# Patient Record
Sex: MALE | Race: WHITE | NOT HISPANIC OR LATINO | URBAN - METROPOLITAN AREA
[De-identification: names, ages, dates, MRNs, and addresses within clinical notes are randomized per-mention and may not be internally consistent; named-entity substitution may affect disease eponyms.]

---

## 2019-03-08 ENCOUNTER — EMERGENCY (EMERGENCY)
Facility: HOSPITAL | Age: 54
LOS: 1 days | Discharge: HOME | End: 2019-03-08
Attending: EMERGENCY MEDICINE
Payer: SELF-PAY

## 2019-03-08 VITALS
TEMPERATURE: 98 F | SYSTOLIC BLOOD PRESSURE: 135 MMHG | HEART RATE: 84 BPM | RESPIRATION RATE: 16 BRPM | OXYGEN SATURATION: 96 % | DIASTOLIC BLOOD PRESSURE: 77 MMHG

## 2019-03-08 VITALS
HEART RATE: 96 BPM | RESPIRATION RATE: 18 BRPM | TEMPERATURE: 98 F | DIASTOLIC BLOOD PRESSURE: 88 MMHG | OXYGEN SATURATION: 96 % | SYSTOLIC BLOOD PRESSURE: 145 MMHG

## 2019-03-08 PROCEDURE — 99284 EMERGENCY DEPT VISIT MOD MDM: CPT | Mod: 25

## 2019-03-08 PROCEDURE — 73590 X-RAY EXAM OF LOWER LEG: CPT | Mod: 26,50

## 2019-03-08 PROCEDURE — 72125 CT NECK SPINE W/O DYE: CPT | Mod: 26

## 2019-03-08 PROCEDURE — 73590 X-RAY EXAM OF LOWER LEG: CPT

## 2019-03-08 PROCEDURE — 70450 CT HEAD/BRAIN W/O DYE: CPT | Mod: 26

## 2019-03-08 PROCEDURE — 93005 ELECTROCARDIOGRAM TRACING: CPT

## 2019-03-08 PROCEDURE — 73090 X-RAY EXAM OF FOREARM: CPT | Mod: 26,LT

## 2019-03-08 PROCEDURE — 73130 X-RAY EXAM OF HAND: CPT

## 2019-03-08 PROCEDURE — 93010 ELECTROCARDIOGRAM REPORT: CPT

## 2019-03-08 PROCEDURE — 73090 X-RAY EXAM OF FOREARM: CPT

## 2019-03-08 PROCEDURE — 70450 CT HEAD/BRAIN W/O DYE: CPT

## 2019-03-08 PROCEDURE — 72125 CT NECK SPINE W/O DYE: CPT

## 2019-03-08 PROCEDURE — 73130 X-RAY EXAM OF HAND: CPT | Mod: 26,LT

## 2019-03-08 PROCEDURE — 99284 EMERGENCY DEPT VISIT MOD MDM: CPT

## 2019-03-08 RX ORDER — IBUPROFEN 200 MG
600 TABLET ORAL ONCE
Qty: 0 | Refills: 0 | Status: COMPLETED | OUTPATIENT
Start: 2019-03-08 | End: 2019-03-08

## 2019-03-08 RX ADMIN — Medication 600 MILLIGRAM(S): at 09:45
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
## 2019-03-08 NOTE — ED ADULT NURSE REASSESSMENT NOTE - NS ED NURSE REASSESS COMMENT FT1
Pt is resting in bed comfortably at this time, no apparent distress noted at this time. pt safety maintained. Pt denies any complaints at this time. Pt is awaiting social work and will plan for discharge, pt states he will follow up with Department of , Plan of care explained, pt states understanding and was able to successfully teach back to show proficiency.

## 2019-03-08 NOTE — ED ADULT NURSE REASSESSMENT NOTE - NS ED NURSE REASSESS COMMENT FT1
Pt able to ambulate safely and steadily w/out assistance, denies dizziness/weakness upon standing, pt offered DSS services and he refused them, stating "I don't need the help." Pt educated in response to his declination of assistance and education deemed successful after teach back provides proficiency. Pt education deemed successful at time of discharge after patient education and teach back proves proficiency. Pt has no distress at time of discharge, pt provided discharge instructions, follow up care and results reviewed by MD. Reinforced by the RN at time of discharge.

## 2019-03-08 NOTE — ED ADULT NURSE NOTE - NSIMPLEMENTINTERV_GEN_ALL_ED
Implemented All Universal Safety Interventions:  [place] to call system. Call bell, personal items and telephone within reach. Instruct patient to call for assistance. Room bathroom lighting operational. Non-slip footwear when patient is off stretcher. Physically safe environment: no spills, clutter or unnecessary equipment. Stretcher in lowest position, wheels locked, appropriate side rails in place.

## 2019-03-08 NOTE — ED ADULT TRIAGE NOTE - CHIEF COMPLAINT QUOTE
Pt BIBA ambulatory c/o difficulty breathing, states he started having a panic attack at approx 2AM. Denies any chest pain or discomfort. States " my brother has asthma, maybe I do too."

## 2019-03-13 NOTE — ED PROVIDER NOTE - OBJECTIVE STATEMENT
52 yo male pmh panic attacks comes to ed with shortness of breath  that started at 2am; symptoms  improved while in ed

## 2019-06-30 ENCOUNTER — EMERGENCY (EMERGENCY)
Facility: HOSPITAL | Age: 54
LOS: 1 days | Discharge: HOME | End: 2019-06-30
Attending: EMERGENCY MEDICINE
Payer: SELF-PAY

## 2019-06-30 VITALS
DIASTOLIC BLOOD PRESSURE: 88 MMHG | OXYGEN SATURATION: 94 % | RESPIRATION RATE: 30 BRPM | WEIGHT: 160.06 LBS | HEIGHT: 72 IN | HEART RATE: 86 BPM | TEMPERATURE: 98 F | SYSTOLIC BLOOD PRESSURE: 172 MMHG

## 2019-06-30 VITALS
RESPIRATION RATE: 16 BRPM | OXYGEN SATURATION: 94 % | HEART RATE: 79 BPM | DIASTOLIC BLOOD PRESSURE: 74 MMHG | SYSTOLIC BLOOD PRESSURE: 158 MMHG

## 2019-06-30 LAB
ALBUMIN SERPL ELPH-MCNC: 3.7 G/DL — SIGNIFICANT CHANGE UP (ref 3.3–5.2)
ALP SERPL-CCNC: 109 U/L — SIGNIFICANT CHANGE UP (ref 40–120)
ALT FLD-CCNC: 60 U/L — HIGH
ANION GAP SERPL CALC-SCNC: 9 MMOL/L — SIGNIFICANT CHANGE UP (ref 5–17)
APTT BLD: 31.4 SEC — SIGNIFICANT CHANGE UP (ref 27.5–36.3)
AST SERPL-CCNC: 37 U/L — SIGNIFICANT CHANGE UP
BILIRUB SERPL-MCNC: <0.2 MG/DL — LOW (ref 0.4–2)
BUN SERPL-MCNC: 30 MG/DL — HIGH (ref 8–20)
CALCIUM SERPL-MCNC: 9 MG/DL — SIGNIFICANT CHANGE UP (ref 8.6–10.2)
CHLORIDE SERPL-SCNC: 106 MMOL/L — SIGNIFICANT CHANGE UP (ref 98–107)
CO2 SERPL-SCNC: 26 MMOL/L — SIGNIFICANT CHANGE UP (ref 22–29)
CREAT SERPL-MCNC: 1.15 MG/DL — SIGNIFICANT CHANGE UP (ref 0.5–1.3)
GLUCOSE SERPL-MCNC: 92 MG/DL — SIGNIFICANT CHANGE UP (ref 70–115)
HCT VFR BLD CALC: 42.1 % — SIGNIFICANT CHANGE UP (ref 39–50)
HGB BLD-MCNC: 13.4 G/DL — SIGNIFICANT CHANGE UP (ref 13–17)
INR BLD: 1.1 RATIO — SIGNIFICANT CHANGE UP (ref 0.88–1.16)
MCHC RBC-ENTMCNC: 30.6 PG — SIGNIFICANT CHANGE UP (ref 27–34)
MCHC RBC-ENTMCNC: 31.8 GM/DL — LOW (ref 32–36)
MCV RBC AUTO: 96.1 FL — SIGNIFICANT CHANGE UP (ref 80–100)
PLATELET # BLD AUTO: 292 K/UL — SIGNIFICANT CHANGE UP (ref 150–400)
POTASSIUM SERPL-MCNC: 4.3 MMOL/L — SIGNIFICANT CHANGE UP (ref 3.5–5.3)
POTASSIUM SERPL-SCNC: 4.3 MMOL/L — SIGNIFICANT CHANGE UP (ref 3.5–5.3)
PROT SERPL-MCNC: 6.2 G/DL — LOW (ref 6.6–8.7)
PROTHROM AB SERPL-ACNC: 12.7 SEC — SIGNIFICANT CHANGE UP (ref 10–12.9)
RBC # BLD: 4.38 M/UL — SIGNIFICANT CHANGE UP (ref 4.2–5.8)
RBC # FLD: 13.2 % — SIGNIFICANT CHANGE UP (ref 10.3–14.5)
SODIUM SERPL-SCNC: 141 MMOL/L — SIGNIFICANT CHANGE UP (ref 135–145)
TROPONIN T SERPL-MCNC: <0.01 NG/ML — SIGNIFICANT CHANGE UP (ref 0–0.06)
WBC # BLD: 13.35 K/UL — HIGH (ref 3.8–10.5)
WBC # FLD AUTO: 13.35 K/UL — HIGH (ref 3.8–10.5)

## 2019-06-30 PROCEDURE — 85730 THROMBOPLASTIN TIME PARTIAL: CPT

## 2019-06-30 PROCEDURE — 99285 EMERGENCY DEPT VISIT HI MDM: CPT

## 2019-06-30 PROCEDURE — 71045 X-RAY EXAM CHEST 1 VIEW: CPT

## 2019-06-30 PROCEDURE — 99284 EMERGENCY DEPT VISIT MOD MDM: CPT | Mod: 25

## 2019-06-30 PROCEDURE — 36415 COLL VENOUS BLD VENIPUNCTURE: CPT

## 2019-06-30 PROCEDURE — 94640 AIRWAY INHALATION TREATMENT: CPT

## 2019-06-30 PROCEDURE — 85610 PROTHROMBIN TIME: CPT

## 2019-06-30 PROCEDURE — 84484 ASSAY OF TROPONIN QUANT: CPT

## 2019-06-30 PROCEDURE — 80053 COMPREHEN METABOLIC PANEL: CPT

## 2019-06-30 PROCEDURE — 85027 COMPLETE CBC AUTOMATED: CPT

## 2019-06-30 PROCEDURE — 71045 X-RAY EXAM CHEST 1 VIEW: CPT | Mod: 26

## 2019-06-30 PROCEDURE — 93010 ELECTROCARDIOGRAM REPORT: CPT

## 2019-06-30 PROCEDURE — 93005 ELECTROCARDIOGRAM TRACING: CPT

## 2019-06-30 PROCEDURE — 99053 MED SERV 10PM-8AM 24 HR FAC: CPT

## 2019-06-30 PROCEDURE — 96374 THER/PROPH/DIAG INJ IV PUSH: CPT

## 2019-06-30 RX ORDER — SODIUM CHLORIDE 9 MG/ML
3 INJECTION INTRAMUSCULAR; INTRAVENOUS; SUBCUTANEOUS ONCE
Refills: 0 | Status: COMPLETED | OUTPATIENT
Start: 2019-06-30 | End: 2019-06-30

## 2019-06-30 RX ORDER — ALBUTEROL 90 UG/1
2 AEROSOL, METERED ORAL
Qty: 1 | Refills: 0
Start: 2019-06-30 | End: 2019-07-29

## 2019-06-30 RX ORDER — AZITHROMYCIN 500 MG/1
500 TABLET, FILM COATED ORAL ONCE
Refills: 0 | Status: COMPLETED | OUTPATIENT
Start: 2019-06-30 | End: 2019-06-30

## 2019-06-30 RX ORDER — AZITHROMYCIN 500 MG/1
2 TABLET, FILM COATED ORAL
Qty: 1 | Refills: 0
Start: 2019-06-30 | End: 2019-07-04

## 2019-06-30 RX ORDER — ALBUTEROL 90 UG/1
2.5 AEROSOL, METERED ORAL ONCE
Refills: 0 | Status: COMPLETED | OUTPATIENT
Start: 2019-06-30 | End: 2019-06-30

## 2019-06-30 RX ADMIN — SODIUM CHLORIDE 3 MILLILITER(S): 9 INJECTION INTRAMUSCULAR; INTRAVENOUS; SUBCUTANEOUS at 08:09

## 2019-06-30 RX ADMIN — AZITHROMYCIN 255 MILLIGRAM(S): 500 TABLET, FILM COATED ORAL at 10:28

## 2019-06-30 RX ADMIN — ALBUTEROL 2.5 MILLIGRAM(S): 90 AEROSOL, METERED ORAL at 10:28

## 2019-06-30 NOTE — ED ADULT NURSE NOTE - CHIEF COMPLAINT QUOTE
pt with difficulty breathing starting three hours ago was trying to sleep, with chest pressure. pt having difficulty speaking full sentences. md called to bedside

## 2019-06-30 NOTE — ED PROVIDER NOTE - CLINICAL SUMMARY MEDICAL DECISION MAKING FREE TEXT BOX
PT WITH SUDDEN ONSET OF SOB AND CHEST PRESSURE. HX HEAVY SMOKING - QUIT APPROX 1-3 MONTHS AGO. NO FEVER OR CHILLS. NO COUGH. NO DX COPD.  CP SS AND NON RADIATING. PRIMARY COMPLAINT SOB. PE DIMINISHED BS , CARDIAC RRR. EKG NSR NO ISCHEMIA. WILL GIVE NEBS AND CHECK LABS PT WITH SUDDEN ONSET OF SOB AND CHEST PRESSURE. HX HEAVY SMOKING - QUIT APPROX 1-3 MONTHS AGO. NO FEVER OR CHILLS. NO COUGH. NO DX COPD.  CP SS AND NON RADIATING. PRIMARY COMPLAINT SOB. PE DIMINISHED BS , CARDIAC RRR. EKG NSR NO ISCHEMIA. WILL GIVE NEBS AND CHECK LABS  PT ELOPED PRIRO TO SIGNING AMA  LABS NORMAL X 1 TROP  CXR ? EARLY RLL INFILTRATE  WILL SEND SCRIPTS TO [facility] [address] AS PER PT IDENTIFYING THAT AS HIS PHARMACY OF CHOICE

## 2019-06-30 NOTE — ED ADULT NURSE NOTE - NSIMPLEMENTINTERV_GEN_ALL_ED
Implemented All Fall Risk Interventions:  [place] to call system. Call bell, personal items and telephone within reach. Instruct patient to call for assistance. Room bathroom lighting operational. Non-slip footwear when patient is off stretcher. Physically safe environment: no spills, clutter or unnecessary equipment. Stretcher in lowest position, wheels locked, appropriate side rails in place. Provide visual cue, wrist band, yellow gown, etc. Monitor gait and stability. Monitor for mental status changes and reorient to person, place, and time. Review medications for side effects contributing to fall risk. Reinforce activity limits and safety measures with patient and family.

## 2019-06-30 NOTE — ED ADULT TRIAGE NOTE - CHIEF COMPLAINT QUOTE
pt with difficulty breathing starting three hours ago was trying to sleep, with chest pressure. pt having difficulty speaking full sentences pt with difficulty breathing starting three hours ago was trying to sleep, with chest pressure. pt having difficulty speaking full sentences. md called to bedside

## 2019-06-30 NOTE — ED ADULT NURSE REASSESSMENT NOTE - NS ED NURSE REASSESS COMMENT FT1
IV zithromax started to infuse per md's order, pt now reporting " I don't like this , I want a pill instead, I really don't like this and I want it out, I feel fine now" iv disconnected per pt request after lengthy discussion as to why it was important to have the IV. pt continues to request it out. Dr. Juarez aware, to speak with pt.
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
## 2019-06-30 NOTE — ED PROVIDER NOTE - PROGRESS NOTE DETAILS
FULL BREATH SOUNDS BILATERALLY. FEELS BETTER PT ASKED RN TO TAKE OUT IV. I WAS PROMPTY CALLED AND SAW PT. PT STATES HE WANTED TO LEAVE THE ER. I EXPLAINED THAT HE NEEDED REPEAT LABS AS PERT OF HIS  WORK UP AND THAT I COULD NOT BE SURE THAT HIS HEART WAS ALRIGHT. I ALSO EXPLAINED THAT HE NEEDED ANTIBIOTICS AND AND AN INHALER FOR HIS LUNG ISSUES. PT STATED HE DID NOT WANT TO STAY DESPITE THE RISK OF HEART ATTACK OR EVEN DEATH. HE UNDERSTOOD THE BENEFIT OF CONTINUED CARE. HE WAS GOING TO SIGN AMA BUT ELOPED PRIOR TO OBTAINING AMA PAPERWORK.

## 2019-06-30 NOTE — ED PROVIDER NOTE - OBJECTIVE STATEMENT
53 yo MALE WITH SUDDEN ONSET SOB AND CHEST PRESSURE. STATES UNABLE TO SLEEP BC OF DIFFICULTY BREATHING. NO FEVER OR CHILLS. NO COUGH . NO SIMILAR PAST EVENTS THAT ARE SIMILAR. CALLED 911 BC HE WAS SCARED. HX HEAVY SMOKER FOR OVER 40 YEARS. CP + PRESSURE . SSCP NO RADIATION. NO N/DIAPHORESIS. DENIES OTHER MEDICAL ISSUES  SOC HX FORMER SMOKER

## 2021-06-26 ENCOUNTER — EMERGENCY (EMERGENCY)
Facility: HOSPITAL | Age: 56
LOS: 1 days | Discharge: HOME | End: 2021-06-26
Attending: EMERGENCY MEDICINE
Payer: COMMERCIAL

## 2021-06-26 VITALS
OXYGEN SATURATION: 98 % | DIASTOLIC BLOOD PRESSURE: 91 MMHG | SYSTOLIC BLOOD PRESSURE: 160 MMHG | HEART RATE: 97 BPM | RESPIRATION RATE: 18 BRPM | HEIGHT: 72 IN | TEMPERATURE: 98 F | WEIGHT: 150.58 LBS

## 2021-06-26 PROCEDURE — 99283 EMERGENCY DEPT VISIT LOW MDM: CPT | Mod: 25

## 2021-06-26 PROCEDURE — 87075 CULTR BACTERIA EXCEPT BLOOD: CPT

## 2021-06-26 PROCEDURE — 87077 CULTURE AEROBIC IDENTIFY: CPT

## 2021-06-26 PROCEDURE — 87205 SMEAR GRAM STAIN: CPT

## 2021-06-26 PROCEDURE — 99284 EMERGENCY DEPT VISIT MOD MDM: CPT | Mod: 25

## 2021-06-26 PROCEDURE — 10061 I&D ABSCESS COMP/MULTIPLE: CPT

## 2021-06-26 PROCEDURE — 87070 CULTURE OTHR SPECIMN AEROBIC: CPT

## 2021-06-26 NOTE — ED PROVIDER NOTE - IV ALTEPLASE EXCL REL HIDDEN
show after drinking po contrast, pt became flushed with generalized pruritis, no hives noted, will give pepcid, benadryl and solumedrol upon discahrge pt is staing sharp burning abdominal pain , similar to that of what brought her in, she is requesting to have an endoscopy because she is worried about an ulcer in light of the negative us and ct of her abdomen. explained to pt that we do not do endoscopy here in the emergency room and that she needs to follow up with her gi Dr. De Leon. Pt did eat 2 turkey sandwhiches and did not vomitingor c/o pain, will give toradol fro pain and encouraged pt to use miralx at [facility] fro chronic constipation

## 2021-06-26 NOTE — ED PROVIDER NOTE - OBJECTIVE STATEMENT
Pt is a 56 y.o. M presenting with abscess for three weeks. Pt states he has had pain and swelling with redness on his right upper back. For the last two days he had some chills prompting his eval in the ED. Not taking anything for pain. Pain feels like prior abscess on shoulder in the past.

## 2021-06-26 NOTE — ED ADULT TRIAGE NOTE - BP NONINVASIVE DIASTOLIC (MM HG)
Data: Vital signs within normal limits. Postpartum checks within normal limits - see flow record. Chronic HTN and takes Labetalol BID 50mg. Patient eating and drinking normally. Patient able to empty bladder independently and is up ambulating. No apparent signs of infection. Incision healing well. Patient performing self cares and is able to care for infant.  Action: Patient medicated during the shift for pain. See MAR. Patient reassessed within 1 hour after each medication and pain was improved - patient stated she was comfortable. Patient education done about breastfeeding, incision care, pain medications, and treatment plan. See flow record.  Response: Positive attachment behaviors observed with infant. Support person present.   Plan: Will continue plan of care.    91

## 2021-06-26 NOTE — ED PROVIDER NOTE - PHYSICAL EXAMINATION
General: NAD  Head:  NC, AT  Eyes: EOMI, PERRLA, no scleral icterus  Ears: no erythema/drainage  Nose: midline, no bleeding/drainage  Throat: MMM  Cardiac: RRR, no m/r/g, no lower extremity edema  Respiratory: CTABL, no wheezes/rales/rhonchi, equal chest wall expansions, no use of accessory muscles, no retractions  Abdomen: soft, nondistended, nontender, no rebound tenderness, no guarding, nonperitonitic  Skin: 3cm x 2cm erythematous swelling on right upper back  MSK/Vascular: full ROM, soft compartments, warm extremities  Neuro: Alert and oriented, motor/sensory intact  Psych: calm, cooperative, normal affect

## 2021-06-26 NOTE — ED PROVIDER NOTE - NSFOLLOWUPINSTRUCTIONS_ED_ALL_ED_FT
Seek medical attention to evaluate wound in 48 hours for packing removal.  Seek medical attention if having new or worsening symptoms.

## 2021-06-26 NOTE — ED PROVIDER NOTE - NS ED ROS FT
Constitutional: no fever, no sweats, and +chills.  Eyes: no pain, no redness, and no visual changes.  ENMT: no ear pain and no hearing problems, no nasal congestion/drainage, no dysphagia, and no throat pain, no neck pain, no stiffness  CV: no chest pain, no edema.  Resp: no cough, no dyspnea  GI: no abdominal pain, no bloating, no constipation, no diarrhea, no nausea and no vomiting.  : no dysuria, no hematuria  MSK: no weakness  Skin: +rashes.  Neuro: no LOC, no headache, no sensory deficits, and no weakness.

## 2021-06-26 NOTE — ED PROVIDER NOTE - ATTENDING CONTRIBUTION TO CARE
Indurated area on the back with small amount of fluctuance consistent with abscess formation, s/p I+D. Wound culture sent. PO abx, wound check in 48 hours.

## 2021-06-26 NOTE — ED PROVIDER NOTE - PATIENT PORTAL LINK FT
You can access the FollowMyHealth Patient Portal offered by [facility] by registering at the following website: [email or web address]. By joining [employer]’s FollowMyHealth portal, you will also be able to view your health information using other applications (apps) compatible with our system.

## 2021-06-26 NOTE — ED PROVIDER NOTE - CLINICAL SUMMARY MEDICAL DECISION MAKING FREE TEXT BOX
56 y.o. M w/ abscess on right upper back. I&D at bedside with sanguinopurulent fluid expressed, sent for wound culture. Discharge with antibiotics and recommended for wound check in two days.

## 2021-07-01 ENCOUNTER — EMERGENCY (EMERGENCY)
Facility: HOSPITAL | Age: 56
LOS: 1 days | Discharge: LEFT BEFORE DISCHARGE | End: 2021-07-01
Attending: EMERGENCY MEDICINE
Payer: COMMERCIAL

## 2021-07-01 VITALS
HEART RATE: 86 BPM | TEMPERATURE: 98 F | RESPIRATION RATE: 26 BRPM | WEIGHT: 179.9 LBS | DIASTOLIC BLOOD PRESSURE: 79 MMHG | SYSTOLIC BLOOD PRESSURE: 155 MMHG | OXYGEN SATURATION: 94 % | HEIGHT: 72 IN

## 2021-07-01 LAB
CULTURE RESULTS: SIGNIFICANT CHANGE UP
SPECIMEN SOURCE: SIGNIFICANT CHANGE UP

## 2021-07-01 PROCEDURE — 99283 EMERGENCY DEPT VISIT LOW MDM: CPT

## 2021-07-01 PROCEDURE — 93010 ELECTROCARDIOGRAM REPORT: CPT

## 2021-07-01 PROCEDURE — 93005 ELECTROCARDIOGRAM TRACING: CPT

## 2021-07-01 PROCEDURE — 99284 EMERGENCY DEPT VISIT MOD MDM: CPT

## 2021-07-01 NOTE — ED ADULT NURSE REASSESSMENT NOTE - NS ED NURSE REASSESS COMMENT FT1
Pt. screaming "f*ck you this is the worse hospital I am leaving", after receiving a salad instead of a sandwich. Deescalation techniques failed. Pt. ripped off all leads and walked out the door. MD Diana made aware. NO IV.

## 2021-07-01 NOTE — ED PROVIDER NOTE - OBJECTIVE STATEMENT
55 y/o male denies PMHx c/o difficulty breathing. Pt states he couldn't breath and had a panic attack, while walking to get groceries and it started raining. Pt endorses abscess to back, states he was here a few days ago, had it drained, had not started antibiotics. Pt states he is living with a friend.

## 2021-07-05 ENCOUNTER — EMERGENCY (EMERGENCY)
Facility: HOSPITAL | Age: 56
LOS: 1 days | Discharge: LEFT BEFORE DISCHARGE | End: 2021-07-05
Attending: EMERGENCY MEDICINE
Payer: COMMERCIAL

## 2021-07-05 VITALS
WEIGHT: 149.91 LBS | DIASTOLIC BLOOD PRESSURE: 79 MMHG | SYSTOLIC BLOOD PRESSURE: 125 MMHG | OXYGEN SATURATION: 92 % | HEIGHT: 72 IN | TEMPERATURE: 98 F | RESPIRATION RATE: 26 BRPM | HEART RATE: 92 BPM

## 2021-07-05 VITALS
TEMPERATURE: 98 F | SYSTOLIC BLOOD PRESSURE: 150 MMHG | RESPIRATION RATE: 20 BRPM | HEART RATE: 86 BPM | OXYGEN SATURATION: 98 % | DIASTOLIC BLOOD PRESSURE: 84 MMHG

## 2021-07-05 LAB — SARS-COV-2 RNA SPEC QL NAA+PROBE: SIGNIFICANT CHANGE UP

## 2021-07-05 PROCEDURE — 93010 ELECTROCARDIOGRAM REPORT: CPT

## 2021-07-05 PROCEDURE — 71045 X-RAY EXAM CHEST 1 VIEW: CPT | Mod: 26

## 2021-07-05 PROCEDURE — 99285 EMERGENCY DEPT VISIT HI MDM: CPT

## 2021-07-05 RX ORDER — DEXAMETHASONE 0.5 MG/5ML
6 ELIXIR ORAL ONCE
Refills: 0 | Status: COMPLETED | OUTPATIENT
Start: 2021-07-05 | End: 2021-07-05

## 2021-07-05 RX ORDER — IPRATROPIUM/ALBUTEROL SULFATE 18-103MCG
3 AEROSOL WITH ADAPTER (GRAM) INHALATION
Refills: 0 | Status: COMPLETED | OUTPATIENT
Start: 2021-07-05 | End: 2021-07-05

## 2021-07-05 RX ADMIN — Medication 3 MILLILITER(S): at 16:09

## 2021-07-05 RX ADMIN — Medication 3 MILLILITER(S): at 16:06

## 2021-07-05 RX ADMIN — Medication 3 MILLILITER(S): at 16:08

## 2021-07-05 RX ADMIN — Medication 6 MILLIGRAM(S): at 16:53

## 2021-07-05 NOTE — ED PROVIDER NOTE - NS ED ROS FT
General: Denies fever, chills  HEENT: Denies sensory changes, sore throat  Neck: Denies neck pain, neck stiffness  Resp: + coughing, SOB  Cardiovascular: Denies CP, palpitations, LE edema  GI: Denies nausea, vomiting, abdominal pain, diarrhea, constipation, blood in stool  : Denies dysuria, hematuria, frequency, incontinence  MSK: Denies back pain  Neuro: Denies HA, dizziness, numbness, weakness  Skin: Denies rashes.

## 2021-07-05 NOTE — ED PROVIDER NOTE - PROGRESS NOTE DETAILS
Patient offered full assessment and treatment with IV, but refusing. informed that this is against medical advise and his risks permanent disability or death by refusing some/all treatment.    The patient will leave against medical advice after nebs and steroids.  We discussed all risks, benefits, and alternatives to the progression of treatment and the potential dangers of leaving including but not limited to permanent disability, injury, and death.  The patient was instructed that they are welcome to change their decision to leave against medical advice and return to the emergency department at any time and for any reason in order to allow us to render care.

## 2021-07-05 NOTE — ED PROVIDER NOTE - ATTENDING CONTRIBUTION TO CARE
56yoM; with pmh signif for COPD(40py smoker, quit 1 month ago); now p/w cough and sob x1 day. states he was in the store and had worsening sob. denies f/c/s. cough--nonproductive. c/o chest tightness--sscp, non-exertional, non-pleuritic, non-radiating, worse with cough. denies abd pain. denies back pain.    General:    mild resp distress  Head:     NC/AT, EOMI, oral mucosa moist  Neck:     trachea midline  Lungs:   exp wheezing, no retractions or nasal flaring.   CVS:     S1S2, RRR, no m/g/r  Abd:     +BS, s/nt/nd, no organomegaly  Ext:    2+ radial and pedal pulses, no c/c/e  Neuro: AAOx3, no sensory/motor deficits  A/P:  56yoM p/w copd exacerbation  -ekg, labs, xray, nebs, steroids, re-eval

## 2021-07-05 NOTE — ED PROVIDER NOTE - OBJECTIVE STATEMENT
57 y/o M former smoker 40 pack years presents with acute on chronic shortness of breath for the past 1 month. States he was at [employer] and "could not breath." States he has had similar episode in the past, denies prior hx of hospitalizations or intubations. States he has not seen a doctor in years. States he believes his symptoms are due to an I&D of an abscess of his upper back on 6/26 but was unable to get antibiotics because he is homeless. Endorses productive cough, yellow in color, non-bloody. Endorses subjective fevers. Denies chest pain. Denies nausea or vomiting. Denies abdominal pain. Endorses cocaine use 1 month ago, denies other ilicit drug use. Denies recent trauma.

## 2021-07-05 NOTE — ED ADULT NURSE NOTE - OBJECTIVE STATEMENT
Pt alert and oriented x4, Pt appears to be in acute distress breathing is labored, tachypneic, pt working to breath sats 89% on RA, pt placed on 3L nc now sats 94%. Pt has a cyst on his upper back which he had drained about 2 weeks ago, site appears red and puss noted to be draining from the center. Pt states he is homeless at this time and was not able to afford his abx that were prescribed. Pt requesting social work. At this time he is refusing "needles", Pt refusing bloodwork and IV. MD made aware. Pt on cardiac monitor, will continue to monitor closely.

## 2021-07-05 NOTE — ED PROVIDER NOTE - PHYSICAL EXAMINATION
General: Well appearing male in no acute distress, 91% on RA, up to 95% on 2-4L nasal cannula   HEENT: Normocephalic, atraumatic. Moist mucous membranes. Oropharynx clear. No lymphadenopathy.  Eyes: No scleral icterus. EOMI. ANNIE.  Neck:. Soft and supple. Full ROM without pain. No midline tenderness  Cardiac: Regular rate and regular rhythm. No murmurs, rubs, gallops. Peripheral pulses 2+ and symmetric. No LE edema.  Resp: Lungs CTAB. Speaking in full sentences. No wheezes, rales or rhonchi.  Abd: Soft, non-tender, non-distended. No guarding or rebound. No scars, masses, or lesions.  Back: Spine midline and non-tender. No CVA tenderness.    Skin: +firm abscess/nodule on upper back - no pus/discharge able to be expressed, outer border appears to be erythematous, non-tender.   Neuro: AO x 3. Moves all extremities symmetrically. Motor strength and sensation grossly intact.

## 2021-07-05 NOTE — ED ADULT TRIAGE NOTE - CHIEF COMPLAINT QUOTE
pt c/o shortness of breath on and off for the past couple of weeks pt 40 year pack a day smoker with history of emphysema,  pt speaking in full sentences at this time.

## 2021-07-05 NOTE — ED PROVIDER NOTE - CLINICAL SUMMARY MEDICAL DECISION MAKING FREE TEXT BOX
55 y/o M former smoker 40 pack years presents with acute on chronic shortness of breath for the past 1 month.   possible COPD exacerbation, pt at 91% on RA, improves up to 95% on 2-4L nasal cannula   possible PNA - productive cough, pt is afebrile   will give duo-nebs / steroids and cxr   pt is refusing IV and blood work - asked multiple times and was refused   abx for abscess that was I&Ded on 6/26/21, appears to be firm/erythematous border

## 2021-07-07 ENCOUNTER — INPATIENT (INPATIENT)
Facility: HOSPITAL | Age: 56
LOS: 7 days | Discharge: HOME | DRG: 180 | End: 2021-07-15
Attending: INTERNAL MEDICINE | Admitting: INTERNAL MEDICINE
Payer: COMMERCIAL

## 2021-07-07 VITALS
DIASTOLIC BLOOD PRESSURE: 75 MMHG | HEART RATE: 85 BPM | HEIGHT: 72 IN | SYSTOLIC BLOOD PRESSURE: 155 MMHG | RESPIRATION RATE: 19 BRPM | TEMPERATURE: 98 F | OXYGEN SATURATION: 98 % | WEIGHT: 165.35 LBS

## 2021-07-07 DIAGNOSIS — F19.10 OTHER PSYCHOACTIVE SUBSTANCE ABUSE, UNCOMPLICATED: ICD-10-CM

## 2021-07-07 DIAGNOSIS — T14.91XA SUICIDE ATTEMPT, INITIAL ENCOUNTER: ICD-10-CM

## 2021-07-07 LAB
ALBUMIN SERPL ELPH-MCNC: 3.3 G/DL — SIGNIFICANT CHANGE UP (ref 3.3–5.2)
ALP SERPL-CCNC: 122 U/L — HIGH (ref 40–120)
ALT FLD-CCNC: 15 U/L — SIGNIFICANT CHANGE UP
AMPHET UR-MCNC: NEGATIVE — SIGNIFICANT CHANGE UP
ANION GAP SERPL CALC-SCNC: 12 MMOL/L — SIGNIFICANT CHANGE UP (ref 5–17)
APAP SERPL-MCNC: <3 UG/ML — LOW (ref 10–26)
APPEARANCE UR: CLEAR — SIGNIFICANT CHANGE UP
AST SERPL-CCNC: 15 U/L — SIGNIFICANT CHANGE UP
BARBITURATES UR SCN-MCNC: NEGATIVE — SIGNIFICANT CHANGE UP
BASOPHILS # BLD AUTO: 0.05 K/UL — SIGNIFICANT CHANGE UP (ref 0–0.2)
BASOPHILS NFR BLD AUTO: 0.3 % — SIGNIFICANT CHANGE UP (ref 0–2)
BENZODIAZ UR-MCNC: NEGATIVE — SIGNIFICANT CHANGE UP
BILIRUB SERPL-MCNC: 0.2 MG/DL — LOW (ref 0.4–2)
BILIRUB UR-MCNC: NEGATIVE — SIGNIFICANT CHANGE UP
BUN SERPL-MCNC: 14.5 MG/DL — SIGNIFICANT CHANGE UP (ref 8–20)
CALCIUM SERPL-MCNC: 9.5 MG/DL — SIGNIFICANT CHANGE UP (ref 8.6–10.2)
CHLORIDE SERPL-SCNC: 100 MMOL/L — SIGNIFICANT CHANGE UP (ref 98–107)
CO2 SERPL-SCNC: 25 MMOL/L — SIGNIFICANT CHANGE UP (ref 22–29)
COCAINE METAB.OTHER UR-MCNC: POSITIVE
COLOR SPEC: YELLOW — SIGNIFICANT CHANGE UP
CREAT SERPL-MCNC: 0.9 MG/DL — SIGNIFICANT CHANGE UP (ref 0.5–1.3)
DIFF PNL FLD: NEGATIVE — SIGNIFICANT CHANGE UP
EOSINOPHIL # BLD AUTO: 0.03 K/UL — SIGNIFICANT CHANGE UP (ref 0–0.5)
EOSINOPHIL NFR BLD AUTO: 0.2 % — SIGNIFICANT CHANGE UP (ref 0–6)
ETHANOL SERPL-MCNC: <10 MG/DL — SIGNIFICANT CHANGE UP (ref 0–9)
GLUCOSE SERPL-MCNC: 126 MG/DL — HIGH (ref 70–99)
GLUCOSE UR QL: NEGATIVE MG/DL — SIGNIFICANT CHANGE UP
HCT VFR BLD CALC: 39.7 % — SIGNIFICANT CHANGE UP (ref 39–50)
HGB BLD-MCNC: 12.8 G/DL — LOW (ref 13–17)
IMM GRANULOCYTES NFR BLD AUTO: 0.6 % — SIGNIFICANT CHANGE UP (ref 0–1.5)
KETONES UR-MCNC: NEGATIVE — SIGNIFICANT CHANGE UP
LEUKOCYTE ESTERASE UR-ACNC: NEGATIVE — SIGNIFICANT CHANGE UP
LYMPHOCYTES # BLD AUTO: 1.1 K/UL — SIGNIFICANT CHANGE UP (ref 1–3.3)
LYMPHOCYTES # BLD AUTO: 5.6 % — LOW (ref 13–44)
MCHC RBC-ENTMCNC: 29.2 PG — SIGNIFICANT CHANGE UP (ref 27–34)
MCHC RBC-ENTMCNC: 32.2 GM/DL — SIGNIFICANT CHANGE UP (ref 32–36)
MCV RBC AUTO: 90.4 FL — SIGNIFICANT CHANGE UP (ref 80–100)
METHADONE UR-MCNC: NEGATIVE — SIGNIFICANT CHANGE UP
MONOCYTES # BLD AUTO: 1.29 K/UL — HIGH (ref 0–0.9)
MONOCYTES NFR BLD AUTO: 6.6 % — SIGNIFICANT CHANGE UP (ref 2–14)
NEUTROPHILS # BLD AUTO: 16.9 K/UL — HIGH (ref 1.8–7.4)
NEUTROPHILS NFR BLD AUTO: 86.7 % — HIGH (ref 43–77)
NITRITE UR-MCNC: NEGATIVE — SIGNIFICANT CHANGE UP
OPIATES UR-MCNC: NEGATIVE — SIGNIFICANT CHANGE UP
PCP SPEC-MCNC: SIGNIFICANT CHANGE UP
PCP UR-MCNC: NEGATIVE — SIGNIFICANT CHANGE UP
PH UR: 7 — SIGNIFICANT CHANGE UP (ref 5–8)
PLATELET # BLD AUTO: 567 K/UL — HIGH (ref 150–400)
POTASSIUM SERPL-MCNC: 3.7 MMOL/L — SIGNIFICANT CHANGE UP (ref 3.5–5.3)
POTASSIUM SERPL-SCNC: 3.7 MMOL/L — SIGNIFICANT CHANGE UP (ref 3.5–5.3)
PROT SERPL-MCNC: 6.8 G/DL — SIGNIFICANT CHANGE UP (ref 6.6–8.7)
PROT UR-MCNC: NEGATIVE MG/DL — SIGNIFICANT CHANGE UP
RBC # BLD: 4.39 M/UL — SIGNIFICANT CHANGE UP (ref 4.2–5.8)
RBC # FLD: 15 % — HIGH (ref 10.3–14.5)
SALICYLATES SERPL-MCNC: <0.6 MG/DL — LOW (ref 10–20)
SARS-COV-2 RNA SPEC QL NAA+PROBE: SIGNIFICANT CHANGE UP
SODIUM SERPL-SCNC: 137 MMOL/L — SIGNIFICANT CHANGE UP (ref 135–145)
SP GR SPEC: 1 — LOW (ref 1.01–1.02)
THC UR QL: POSITIVE
TROPONIN T SERPL-MCNC: <0.01 NG/ML — SIGNIFICANT CHANGE UP (ref 0–0.06)
UROBILINOGEN FLD QL: NEGATIVE MG/DL — SIGNIFICANT CHANGE UP
WBC # BLD: 19.49 K/UL — HIGH (ref 3.8–10.5)
WBC # FLD AUTO: 19.49 K/UL — HIGH (ref 3.8–10.5)

## 2021-07-07 PROCEDURE — 99223 1ST HOSP IP/OBS HIGH 75: CPT

## 2021-07-07 PROCEDURE — 99284 EMERGENCY DEPT VISIT MOD MDM: CPT | Mod: 25

## 2021-07-07 PROCEDURE — 93005 ELECTROCARDIOGRAM TRACING: CPT

## 2021-07-07 PROCEDURE — 96372 THER/PROPH/DIAG INJ SC/IM: CPT

## 2021-07-07 PROCEDURE — 94640 AIRWAY INHALATION TREATMENT: CPT

## 2021-07-07 PROCEDURE — U0003: CPT

## 2021-07-07 PROCEDURE — 84484 ASSAY OF TROPONIN QUANT: CPT

## 2021-07-07 PROCEDURE — 71045 X-RAY EXAM CHEST 1 VIEW: CPT

## 2021-07-07 PROCEDURE — U0005: CPT

## 2021-07-07 PROCEDURE — 99222 1ST HOSP IP/OBS MODERATE 55: CPT

## 2021-07-07 PROCEDURE — 71260 CT THORAX DX C+: CPT | Mod: 26,MA

## 2021-07-07 PROCEDURE — 99285 EMERGENCY DEPT VISIT HI MDM: CPT

## 2021-07-07 PROCEDURE — 36415 COLL VENOUS BLD VENIPUNCTURE: CPT

## 2021-07-07 RX ORDER — ENOXAPARIN SODIUM 100 MG/ML
40 INJECTION SUBCUTANEOUS DAILY
Refills: 0 | Status: DISCONTINUED | OUTPATIENT
Start: 2021-07-07 | End: 2021-07-07

## 2021-07-07 RX ORDER — RISPERIDONE 4 MG/1
1 TABLET ORAL
Refills: 0 | Status: DISCONTINUED | OUTPATIENT
Start: 2021-07-07 | End: 2021-07-15

## 2021-07-07 RX ORDER — NICOTINE POLACRILEX 2 MG
1 GUM BUCCAL DAILY
Refills: 0 | Status: DISCONTINUED | OUTPATIENT
Start: 2021-07-07 | End: 2021-07-15

## 2021-07-07 RX ORDER — ACETAMINOPHEN 500 MG
650 TABLET ORAL EVERY 6 HOURS
Refills: 0 | Status: DISCONTINUED | OUTPATIENT
Start: 2021-07-07 | End: 2021-07-15

## 2021-07-07 RX ORDER — PIPERACILLIN AND TAZOBACTAM 4; .5 G/20ML; G/20ML
3.38 INJECTION, POWDER, LYOPHILIZED, FOR SOLUTION INTRAVENOUS EVERY 8 HOURS
Refills: 0 | Status: DISCONTINUED | OUTPATIENT
Start: 2021-07-07 | End: 2021-07-13

## 2021-07-07 RX ORDER — ENOXAPARIN SODIUM 100 MG/ML
40 INJECTION SUBCUTANEOUS AT BEDTIME
Refills: 0 | Status: DISCONTINUED | OUTPATIENT
Start: 2021-07-08 | End: 2021-07-15

## 2021-07-07 RX ORDER — IPRATROPIUM/ALBUTEROL SULFATE 18-103MCG
3 AEROSOL WITH ADAPTER (GRAM) INHALATION EVERY 6 HOURS
Refills: 0 | Status: DISCONTINUED | OUTPATIENT
Start: 2021-07-07 | End: 2021-07-09

## 2021-07-07 RX ORDER — PIPERACILLIN AND TAZOBACTAM 4; .5 G/20ML; G/20ML
3.38 INJECTION, POWDER, LYOPHILIZED, FOR SOLUTION INTRAVENOUS ONCE
Refills: 0 | Status: COMPLETED | OUTPATIENT
Start: 2021-07-07 | End: 2021-07-07

## 2021-07-07 RX ORDER — TIOTROPIUM BROMIDE 18 UG/1
1 CAPSULE ORAL; RESPIRATORY (INHALATION) DAILY
Refills: 0 | Status: DISCONTINUED | OUTPATIENT
Start: 2021-07-07 | End: 2021-07-15

## 2021-07-07 RX ORDER — ALBUTEROL 90 UG/1
1 AEROSOL, METERED ORAL EVERY 4 HOURS
Refills: 0 | Status: DISCONTINUED | OUTPATIENT
Start: 2021-07-07 | End: 2021-07-15

## 2021-07-07 RX ORDER — TRAZODONE HCL 50 MG
100 TABLET ORAL AT BEDTIME
Refills: 0 | Status: DISCONTINUED | OUTPATIENT
Start: 2021-07-07 | End: 2021-07-15

## 2021-07-07 RX ORDER — OXYCODONE AND ACETAMINOPHEN 5; 325 MG/1; MG/1
1 TABLET ORAL EVERY 4 HOURS
Refills: 0 | Status: DISCONTINUED | OUTPATIENT
Start: 2021-07-07 | End: 2021-07-13

## 2021-07-07 RX ORDER — LANOLIN ALCOHOL/MO/W.PET/CERES
3 CREAM (GRAM) TOPICAL AT BEDTIME
Refills: 0 | Status: DISCONTINUED | OUTPATIENT
Start: 2021-07-07 | End: 2021-07-15

## 2021-07-07 RX ADMIN — OXYCODONE AND ACETAMINOPHEN 1 TABLET(S): 5; 325 TABLET ORAL at 19:06

## 2021-07-07 RX ADMIN — Medication 3 MILLIGRAM(S): at 21:10

## 2021-07-07 RX ADMIN — Medication 3 MILLILITER(S): at 20:55

## 2021-07-07 RX ADMIN — Medication 40 MILLIGRAM(S): at 19:06

## 2021-07-07 RX ADMIN — RISPERIDONE 1 MILLIGRAM(S): 4 TABLET ORAL at 21:10

## 2021-07-07 RX ADMIN — PIPERACILLIN AND TAZOBACTAM 25 GRAM(S): 4; .5 INJECTION, POWDER, LYOPHILIZED, FOR SOLUTION INTRAVENOUS at 19:07

## 2021-07-07 RX ADMIN — Medication 100 MILLIGRAM(S): at 21:10

## 2021-07-07 RX ADMIN — Medication 1 PATCH: at 21:10

## 2021-07-07 RX ADMIN — PIPERACILLIN AND TAZOBACTAM 200 GRAM(S): 4; .5 INJECTION, POWDER, LYOPHILIZED, FOR SOLUTION INTRAVENOUS at 10:38

## 2021-07-07 RX ADMIN — Medication 2 MILLIGRAM(S): at 20:02

## 2021-07-07 RX ADMIN — Medication 3 MILLILITER(S): at 14:07

## 2021-07-07 NOTE — BH CONSULTATION LIAISON ASSESSMENT NOTE - HPI (INCLUDE ILLNESS QUALITY, SEVERITY, DURATION, TIMING, CONTEXT, MODIFYING FACTORS, ASSOCIATED SIGNS AND SYMPTOMS)
Pt is a 57y/o homeless M with pmhx of "tobacco use [last use 1982], bipolar, schizophrenia, and manic depression" who presented to the ED on 7/7/21 s/p SI with attempt by jumping in front of traffic. Upon arrival to ED, pt was worked up for a non healing ulcer on his back which prompted a chest x-ray and CT; there was an incidental finding of LN enlargement and right middle lobe speculated lung mass [likely malignant]. Upon presentation pt was laying in bed in mild distress with SOB [pt refuses to use O2 and nasal cannula]. Pt endorsed his SA stating "I am tired of life; I have bad luck in life". He states that he has been feeling suicidal since the death of his mother [1998], but that this was his first attempt. Pt endorses that he was diagnosed with psych d/o as well as managed  while he was in [facility] [1989]. He states that he remained in [facility] for 20 years where he was prescribed Trazodone, with some relief but with adverse s/e of nausea and shakiness. Since his release, pt endorsed not being able to hold a job stating "I am not a productive member of society; I should be in a straight jacket". Pt admits to THC use [since 1982] and "occasional" cocaine use [both + on tox screen]. During interview pt was tangental and needed constant redirection, and would get intermittently agitated with paranoid thoughts that staff was "making fun" of him. He expressed not being afraid to "take them down", [which prompted staff to move him] but then denied having HI. Pt states having "possible" AH, expressing that he hears his mom calling him, but is unsure whether its his thoughts vs hallucinations. He denied VH or etoh use. Pt exhibited behavior that may be a danger to self [active SI] and others [threatening ED staff].

## 2021-07-07 NOTE — CONSULT NOTE ADULT - TIME BILLING
greater than 50% of time spent reviewing labs, notes, orders and radiographs, coordinating care  pt at bedside, PMD

## 2021-07-07 NOTE — BH CONSULTATION LIAISON ASSESSMENT NOTE - CURRENT MEDICATION
MEDICATIONS  (STANDING):  ALBUTerol    90 MICROgram(s) HFA Inhaler 1 Puff(s) Inhalation every 4 hours  albuterol/ipratropium for Nebulization 3 milliLiter(s) Nebulizer every 6 hours  enoxaparin Injectable 40 milliGRAM(s) SubCutaneous daily  piperacillin/tazobactam IVPB.. 3.375 Gram(s) IV Intermittent every 8 hours  tiotropium 18 MICROgram(s) Capsule 1 Capsule(s) Inhalation daily    MEDICATIONS  (PRN):

## 2021-07-07 NOTE — BH CONSULTATION LIAISON ASSESSMENT NOTE - DETAILS
Nausea and shakiness Post interview, pt had to be moved d/t making threats to nurse that he was going to "stab her"   During interview pt stated "taking people down" if they make fun of him  Pt endorsed many years of SI, with current episode being his first attempt. SI were worsened after the death of his mother [1998] +Dyspnea

## 2021-07-07 NOTE — ED ADULT NURSE NOTE - OBJECTIVE STATEMENT
Pt is a 55 y/o male w/ a hx of manic bipolar depression prsent to the ED c/o suicidal thoughts w/ a plan to jump in front of a car. pt states he is depressed and had a "blow out" with his brother. pt states he was at [address] [place] [address] years ago after his mother . pt states he is on trazodone. Pt denies any A/V/H, HI, prior SA or any recent etoh/drug use.

## 2021-07-07 NOTE — ED PROVIDER NOTE - ATTENDING CONTRIBUTION TO CARE
55yo male former smoker with no pmh presents with SI. Pt states has been depressed since his mother passed away. Pt states he wanted to get hit by a car. Pt denies fevers/chills, ha, loc, focal neuro deficits, cp/sob/palp, cough, abd pain/n/v/d, urinary symptoms, recent travel and sick contacts.  Pt has had visits in the past for sob and had abnormal cxr.  Const: Awake, alert and oriented. In no acute distress. Well appearing.  HEENT: NC/AT. Moist mucous membranes.  Eyes: No scleral icterus. EOMI.  Neck:. Soft and supple. Full ROM without pain.  Cardiac: Regular rate and regular rhythm. +S1/S2. Peripheral pulses 2+ and symmetric. No LE edema.  Resp: Speaking in full sentences. No evidence of respiratory distress. No wheezes, rales or rhonchi.  Abd: Soft, non-tender, non-distended. Normal bowel sounds in all 4 quadrants. No guarding or rebound.  Back: Spine midline and non-tender. No CVAT.  Skin: ulcerated lesion to the right back, with surrounding erythema  Lymph: No cervical lymphadenopathy.  Neuro: Awake, alert & oriented x 3. Moves all extremities symmetrically.  pt signed out pending CT chest and  eval

## 2021-07-07 NOTE — PATIENT PROFILE ADULT - NSPROPTRIGHTSUPPORTPERSON_GEN_A_NUR
declines DTs (delirium tremens)    EtOH dependence    Gastritis    Mixed hyperlipidemia    PUD (peptic ulcer disease)    Substance abuse

## 2021-07-07 NOTE — ED ADULT NURSE REASSESSMENT NOTE - NS ED NURSE REASSESS COMMENT FT1
Assumed care of patient at 0720.  Patient oriented to staff and educated about plan of care.  Patient transferred to CT scan for completion of testing without incident.  Patient provided breakfast tray and is eating in his room.  No attempts to harm self or others and safety maintained.

## 2021-07-07 NOTE — PATIENT PROFILE ADULT - STATED REASON FOR ADMISSION
Per patient "feeling short  of breath, tried comon=ing to the hospital, but ran into oncoming traffic."

## 2021-07-07 NOTE — ED ADULT NURSE REASSESSMENT NOTE - NS ED NURSE REASSESS COMMENT FT1
Report called to Cally Nieto RN.  Patient educated about transfer to main ED and transferred via wheelchair.  Safety of patient maintained.
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
## 2021-07-07 NOTE — PATIENT PROFILE ADULT - MEDICATIONS/VISITS
Note Text (......Xxx Chief Complaint.): This diagnosis correlates with the
Detail Level: Zone
Other (Free Text): Continue Allegra 180 qam, continue Benadryl qhs\\nSeen by Dr Pena today during today's visit.  Unsure if lesion related to doxy reaction. Appears more c/w spider bite. Pt to return immediately if sx worsen.
no

## 2021-07-07 NOTE — ED ADULT TRIAGE NOTE - CHIEF COMPLAINT QUOTE
Patient with complains of suicidal ideation with plan stating "noting is going good in my life I want to jump in front of car". Refusing to discuss further in triage stating " can I see psychiatrist, I don't want to talk about it over here".

## 2021-07-07 NOTE — BH CONSULTATION LIAISON ASSESSMENT NOTE - NSICDXPASTMEDICALHX_GEN_ALL_CORE_FT
PAST MEDICAL HISTORY:  Bipolar disorder     Depression     No pertinent past medical history     Schizophrenia

## 2021-07-07 NOTE — BH CONSULTATION LIAISON ASSESSMENT NOTE - VIOLENCE RISK FACTORS:
Antisocial behavior/cognition (past or present)/Violent ideation/threat/speech/Impulsivity/Noncompliance with treatment/Irritability

## 2021-07-07 NOTE — BH CONSULTATION LIAISON ASSESSMENT NOTE - NSBHCHARTREVIEWVS_PSY_A_CORE FT
Vital Signs Last 24 Hrs  T(C): 36.8 (07 Jul 2021 07:48), Max: 36.8 (07 Jul 2021 07:48)  T(F): 98.3 (07 Jul 2021 07:48), Max: 98.3 (07 Jul 2021 07:48)  HR: 80 (07 Jul 2021 10:35) (80 - 94)  BP: 153/87 (07 Jul 2021 07:48) (126/80 - 155/75)  BP(mean): --  RR: 18 (07 Jul 2021 10:35) (18 - 19)  SpO2: 95% (07 Jul 2021 10:35) (93% - 98%)
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
## 2021-07-07 NOTE — H&P ADULT - NSICDXFAMILYHX_GEN_ALL_CORE_FT
FAMILY HISTORY:  FH: lung cancer    Mother  Still living? Unknown  FH: diabetes mellitus, Age at diagnosis: Age Unknown
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
## 2021-07-07 NOTE — ED PROVIDER NOTE - PHYSICAL EXAMINATION
General: well appearing, interactive, well nourished, NAD  HEENT: pupils equal and reactive, normal external ears bilaterally   Cardiac: RRR, no MRG appreciated  Resp: lungs clear to auscultation bilaterally, symmetric chest wall rise  Abd: soft, nontender, nondistended,   : no CVA tenderness  Neuro: Moving all extremities  Skin:  normal color for race General: well appearing, interactive, well nourished, NAD  HEENT: pupils equal and reactive, normal external ears bilaterally   Cardiac: RRR, no MRG appreciated  Resp: lungs clear to auscultation bilaterally, symmetric chest wall rise  Abd: soft, nontender, nondistended,   : no CVA tenderness  Neuro: Moving all extremities  Skin:  4 x 3 cm area of ulceration posterior chest wall, minimal surrounding erythema
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
## 2021-07-07 NOTE — ED PROVIDER NOTE - NS ED ROS FT
CONSTITUTIONAL: No fevers, no chills  Eyes: No vision changes  Cardiovascular: No Chest pain  Respiratory: No SOB  Gastrointestinal: No n/v/c/d, no abd pain  Genitourinary: no dysuria, no hematuria  SKIN: no rashes.  MSK: no weakness, no myalgias, no arthralgias  NEURO: no headache, no weakness, no numbness  PSYCHIATRIC: +SI, no HI, no command hallucinations
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
## 2021-07-07 NOTE — ED PROVIDER NOTE - PROGRESS NOTE DETAILS
Todd PGY-3: Pt reassessed and found to have a 3x3 cm abscess on right upper back.  Will get CT prior to I&D. Adiel: I reviewed patient's CT results. Adiel: I discussed CT findings with Dr. PATRICIO of psychiatry. Pt will be psychiatrically evaluated and then will re-eval plan for CT findings/onc plan. Adiel: Dr. Horton and I informed patient of CT findings - discussed that mass could be 2/2 infectious vs neoplastic source. Pt informed of need for medical admission and agreeable.

## 2021-07-07 NOTE — BH CONSULTATION LIAISON ASSESSMENT NOTE - OTHER PAST PSYCHIATRIC HISTORY (INCLUDE DETAILS REGARDING ONSET, COURSE OF ILLNESS, INPATIENT/OUTPATIENT TREATMENT)
Bipolar, schizophrenia, manic depression [diagnosed in [facility]; did not elaborate year]

## 2021-07-07 NOTE — H&P ADULT - NSHPLABSRESULTS_GEN_ALL_CORE
12.8   19.49 )-----------( 567      ( 07 Jul 2021 04:55 )             39.7       07-07    137  |  100  |  14.5  ----------------------------<  126<H>  3.7   |  25.0  |  0.90    Ca    9.5      07 Jul 2021 04:55    TPro  6.8  /  Alb  3.3  /  TBili  0.2<L>  /  DBili  x   /  AST  15  /  ALT  15  /  AlkPhos  122<H>  07-07      < from: CT Chest w/ IV Cont (07.07.21 @ 07:43) >     EXAM:  CT CHEST IC                          PROCEDURE DATE:  07/07/2021          INTERPRETATION:  CLINICAL INFORMATION: Solitary pulmonary nodule, abnormal chest x-ray    COMPARISON: Chest x-ray 2 days prior    CONTRAST/COMPLICATIONS:  IV Contrast: Omnipaque 300  95 cc administered   5 cc discarded  Oral Contrast: NONE  Complications: None reported at time of study completion    PROCEDURE:  CT of the Chest was performed.  Sagittal and coronal reformats were performed.    FINDINGS:    LUNGS ANDAIRWAYS: There is severe narrowing of the bronchus intermedius and left mainstem bronchus secondary to large adjacent subcarinal lymphadenopathy. Severe background emphysema. Lateral segment right middle lobe spiculated nodule measures 3.1 x 2.6 cm abutting the major fissure and possibly crossing the fissure. Bibasilar patchy airspace disease, likely infectious.  PLEURA: No pleural abnormality.  VESSELS: Normal caliber aorta. Main pulmonary arteries are patent. Slight mass effect on the SVC by lymphadenopathy without occlusion or collaterals.  HEART: Normal heart size. No pericardial effusion.  MEDIASTINUM AND RAULITO: Extensive hilar and mediastinal lymphadenopathy. For reference:  -Subcarinal lymph node 4.8 x 4.7 cm (3-100)  -Right upper paratracheal lymph node 2.8 x 2.4 cm (3-47)  CHEST WALL AND LOW1ER NECK: Right posterior chest wall metastasis with suggestion of overlying skin ulceration measuring 3.3 x 2.1 cm (3-108)  VISUALIZED UPPER ABDOMEN: 2.4 x 1.8 cm indeterminate left adrenal nodule. Bilateral renal cortical scarring.  BONES: No aggressive lesion.    IMPRESSION:  *  Lateral segment right middle lobe spiculated nodule measures 3.1 x 2.6 cm abutting the major fissure and possibly crossing the fissure indicative of primary lungneoplasm.  *  Extensive mediastinal and hilar lymphadenopathy extending to the right upper paratracheal nodes.  *  Large subcarinal lymph node results in severe narrowing of the bronchus intermedius and left mainstem bronchus.  *  2.4 x 1.8 cm indeterminate left adrenal nodule  *  Right posterior chest wall metastasis with suggestion of overlying skin ulceration measuring 3.3 x 2.1 cm (3-108). Correlate with physical exam and tissue sampling.  *  Bibasilar patchy airspace disease, likely infectious.      --- End of Report ---            REYNAMIGUEL A GREER MD; Attending Radiologist  This document has been electronically signed. Jul 7 2021  8:30AM    < end of copied text >
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
## 2021-07-07 NOTE — H&P ADULT - NSHPPHYSICALEXAM_GEN_ALL_CORE
PHYSICAL EXAM:  Vital Signs Last 24 Hrs  T(C): 36.8 (07 Jul 2021 07:48), Max: 36.8 (07 Jul 2021 07:48)  T(F): 98.3 (07 Jul 2021 07:48), Max: 98.3 (07 Jul 2021 07:48)  HR: 94 (07 Jul 2021 07:48) (85 - 94)  BP: 153/87 (07 Jul 2021 07:48) (126/80 - 155/75)  BP(mean): --  RR: 18 (07 Jul 2021 07:48) (18 - 19)  SpO2: 95% (07 Jul 2021 07:48) (93% - 98%)    GENERAL: NAD, well-groomed, well-developed  HEAD:  Atraumatic, Normocephalic  EYES: EOMI, PERRLA, conjunctiva and sclera clear  ENMT: No tonsillar erythema, exudates, or enlargement; Moist mucous membranes  NERVOUS SYSTEM:  Alert & Oriented X3, Good concentration; Motor Strength 5/5 B/L upper and lower extremities  CHEST/LUNG: bilaterally rales, rhonchi  HEART: Regular rate and rhythm; No murmurs  ABDOMEN: Soft, Nontender, Nondistended; Bowel sounds present  EXTREMITIES:  2+ Peripheral Pulses, No clubbing, cyanosis, or edema  SKIN: right back 3x3 cm nodular ulcer, tender, no drainage with surrounding erythema
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
## 2021-07-07 NOTE — ED ADULT NURSE NOTE - NS ED NOTE ABUSE SUSPICION NEGLECT YN
No
Podiatry resident at bedside upon patient's presentation.  Recommend broad spectrum abx and vascular cs; plan to admit and perform surgery in 5 days.  Pt signed out to Dr. Painting shortly after initial exam and hx.  --BMM

## 2021-07-07 NOTE — ED ADULT NURSE NOTE - NS ED NURSE LEVEL OF CONSCIOUSNESS ORIENTATION
Oriented - self; Oriented - place; Oriented - time/Age appropriate behavior/Recognizes caregiver/Ideation - suicidal

## 2021-07-07 NOTE — BH CONSULTATION LIAISON ASSESSMENT NOTE - DESCRIPTION
Pt was imprisoned in 1989 for manslaughter and remained for 20 years. He has been homeless x many years [he did not elaborate], has not been able to hold a job. Pt has a brother and sister who he does not stay in contact with.
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
## 2021-07-08 ENCOUNTER — RESULT REVIEW (OUTPATIENT)
Age: 56
End: 2021-07-08

## 2021-07-08 DIAGNOSIS — R91.8 OTHER NONSPECIFIC ABNORMAL FINDING OF LUNG FIELD: ICD-10-CM

## 2021-07-08 LAB
ANION GAP SERPL CALC-SCNC: 13 MMOL/L — SIGNIFICANT CHANGE UP (ref 5–17)
APTT BLD: 32.8 SEC — SIGNIFICANT CHANGE UP (ref 27.5–35.5)
BUN SERPL-MCNC: 18.3 MG/DL — SIGNIFICANT CHANGE UP (ref 8–20)
CALCIUM SERPL-MCNC: 9.8 MG/DL — SIGNIFICANT CHANGE UP (ref 8.6–10.2)
CHLORIDE SERPL-SCNC: 102 MMOL/L — SIGNIFICANT CHANGE UP (ref 98–107)
CO2 SERPL-SCNC: 24 MMOL/L — SIGNIFICANT CHANGE UP (ref 22–29)
COVID-19 SPIKE DOMAIN AB INTERP: NEGATIVE — SIGNIFICANT CHANGE UP
COVID-19 SPIKE DOMAIN ANTIBODY RESULT: 0.4 U/ML — SIGNIFICANT CHANGE UP
CREAT SERPL-MCNC: 0.95 MG/DL — SIGNIFICANT CHANGE UP (ref 0.5–1.3)
GLUCOSE SERPL-MCNC: 138 MG/DL — HIGH (ref 70–99)
GRAM STN FLD: SIGNIFICANT CHANGE UP
HCT VFR BLD CALC: 40.5 % — SIGNIFICANT CHANGE UP (ref 39–50)
HGB BLD-MCNC: 13.2 G/DL — SIGNIFICANT CHANGE UP (ref 13–17)
INR BLD: 1.35 RATIO — HIGH (ref 0.88–1.16)
MAGNESIUM SERPL-MCNC: 2.2 MG/DL — SIGNIFICANT CHANGE UP (ref 1.8–2.6)
MCHC RBC-ENTMCNC: 28.8 PG — SIGNIFICANT CHANGE UP (ref 27–34)
MCHC RBC-ENTMCNC: 32.6 GM/DL — SIGNIFICANT CHANGE UP (ref 32–36)
MCV RBC AUTO: 88.4 FL — SIGNIFICANT CHANGE UP (ref 80–100)
NIGHT BLUE STAIN TISS: SIGNIFICANT CHANGE UP
PLATELET # BLD AUTO: 560 K/UL — HIGH (ref 150–400)
POTASSIUM SERPL-MCNC: 4.3 MMOL/L — SIGNIFICANT CHANGE UP (ref 3.5–5.3)
POTASSIUM SERPL-SCNC: 4.3 MMOL/L — SIGNIFICANT CHANGE UP (ref 3.5–5.3)
PROTHROM AB SERPL-ACNC: 15.4 SEC — HIGH (ref 10.6–13.6)
RBC # BLD: 4.58 M/UL — SIGNIFICANT CHANGE UP (ref 4.2–5.8)
RBC # FLD: 15 % — HIGH (ref 10.3–14.5)
SARS-COV-2 IGG+IGM SERPL QL IA: 0.4 U/ML — SIGNIFICANT CHANGE UP
SARS-COV-2 IGG+IGM SERPL QL IA: NEGATIVE — SIGNIFICANT CHANGE UP
SODIUM SERPL-SCNC: 139 MMOL/L — SIGNIFICANT CHANGE UP (ref 135–145)
SPECIMEN SOURCE: SIGNIFICANT CHANGE UP
SPECIMEN SOURCE: SIGNIFICANT CHANGE UP
WBC # BLD: 14.47 K/UL — HIGH (ref 3.8–10.5)
WBC # FLD AUTO: 14.47 K/UL — HIGH (ref 3.8–10.5)

## 2021-07-08 PROCEDURE — 88333 PATH CONSLTJ SURG CYTO XM 1: CPT | Mod: 26

## 2021-07-08 PROCEDURE — 74177 CT ABD & PELVIS W/CONTRAST: CPT | Mod: 26

## 2021-07-08 PROCEDURE — 99232 SBSQ HOSP IP/OBS MODERATE 35: CPT

## 2021-07-08 PROCEDURE — 99233 SBSQ HOSP IP/OBS HIGH 50: CPT

## 2021-07-08 PROCEDURE — 88305 TISSUE EXAM BY PATHOLOGIST: CPT | Mod: 26

## 2021-07-08 PROCEDURE — 20206 BIOPSY MUSCLE PERQ NEEDLE: CPT

## 2021-07-08 PROCEDURE — 88341 IMHCHEM/IMCYTCHM EA ADD ANTB: CPT | Mod: 26

## 2021-07-08 PROCEDURE — 99221 1ST HOSP IP/OBS SF/LOW 40: CPT

## 2021-07-08 PROCEDURE — 88342 IMHCHEM/IMCYTCHM 1ST ANTB: CPT | Mod: 26

## 2021-07-08 PROCEDURE — 76942 ECHO GUIDE FOR BIOPSY: CPT | Mod: 26

## 2021-07-08 RX ADMIN — Medication 1 PATCH: at 12:08

## 2021-07-08 RX ADMIN — Medication 1 PATCH: at 21:00

## 2021-07-08 RX ADMIN — PIPERACILLIN AND TAZOBACTAM 25 GRAM(S): 4; .5 INJECTION, POWDER, LYOPHILIZED, FOR SOLUTION INTRAVENOUS at 02:04

## 2021-07-08 RX ADMIN — Medication 40 MILLIGRAM(S): at 05:14

## 2021-07-08 RX ADMIN — Medication 3 MILLILITER(S): at 03:03

## 2021-07-08 RX ADMIN — Medication 100 MILLIGRAM(S): at 21:16

## 2021-07-08 RX ADMIN — OXYCODONE AND ACETAMINOPHEN 1 TABLET(S): 5; 325 TABLET ORAL at 17:30

## 2021-07-08 RX ADMIN — RISPERIDONE 1 MILLIGRAM(S): 4 TABLET ORAL at 05:14

## 2021-07-08 RX ADMIN — PIPERACILLIN AND TAZOBACTAM 25 GRAM(S): 4; .5 INJECTION, POWDER, LYOPHILIZED, FOR SOLUTION INTRAVENOUS at 21:15

## 2021-07-08 RX ADMIN — Medication 3 MILLILITER(S): at 20:57

## 2021-07-08 RX ADMIN — ENOXAPARIN SODIUM 40 MILLIGRAM(S): 100 INJECTION SUBCUTANEOUS at 21:17

## 2021-07-08 RX ADMIN — RISPERIDONE 1 MILLIGRAM(S): 4 TABLET ORAL at 17:31

## 2021-07-08 RX ADMIN — PIPERACILLIN AND TAZOBACTAM 25 GRAM(S): 4; .5 INJECTION, POWDER, LYOPHILIZED, FOR SOLUTION INTRAVENOUS at 12:08

## 2021-07-08 RX ADMIN — Medication 3 MILLIGRAM(S): at 21:16

## 2021-07-08 RX ADMIN — Medication 1 PATCH: at 12:04

## 2021-07-08 RX ADMIN — Medication 30 MILLIGRAM(S): at 18:20

## 2021-07-08 RX ADMIN — Medication 3 MILLILITER(S): at 09:04

## 2021-07-08 RX ADMIN — Medication 1 PATCH: at 18:57
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
## 2021-07-08 NOTE — CONSULT NOTE ADULT - ATTENDING COMMENTS
Patient seen and examined. Await skin biopsy results, will talk to pathology on Monday. If we don't have a diagnosis will proceed to bronch/EBUS biopsy. Likely primary lung cancer with involvement of the mediastinum. Unsure if the skin lesion is a metastatic site or not.

## 2021-07-08 NOTE — CONSULT NOTE ADULT - PROBLEM SELECTOR RECOMMENDATION 9
Dr. Amaya to review images  prefer to wait til skin biopsy resulted, however since not usually METS from lung and concerning for two primary malignancies, can potential do FB/EBUS Friday vs Monday if pt amenable. Pt is currently undecided if he would like to have an additional procedure at this time.  IF able to schedule for tomorrow, will make pt NPO and preop pt in case he is willing tomorrow.    d/w Dr. Amaya

## 2021-07-08 NOTE — CONSULT NOTE ADULT - ASSESSMENT
56M, pmhx ETOH abuse, active smoker, bipolar dz, schizophrenia, p/w suicidal ideations, recent ED visits for SOB, found to have spiculated RML nodule with multiple lymph nodes, s/p biopsy of R posterior abscess, need lung mass biopsy;

## 2021-07-08 NOTE — CONSULT NOTE ADULT - REASON FOR ADMISSION
Right lung mass with PNA and suicide attempt

## 2021-07-08 NOTE — PROCEDURE NOTE - PROCEDURE FINDINGS AND DETAILS
7, 16 g cores obtained with US guidance.     1st specimen for cs (including afb)  2-5 in formalin  6-7 in rpmi    + touch prep acc to dr pineda    no complication    dermabond applied

## 2021-07-08 NOTE — PROGRESS NOTE ADULT - ASSESSMENT
CT chest with lung mass and extensive adenopath y, necrotic looking  Underlying COPD, Nicotine addiction, polysubstance use  Psych post suicide attempt  Post IR guided, skin lesion bx, results pending, favor EBUS/bronch if non dx  Oncology input noted, CT abd/pelvis, MRI head pending  nicotine replacement, smoking cessation,  Lung exam improved, wean medrol, short prednisone taper, continue nebs, will need home nebulizer  Psych Tele input noted  Prognosis guarded     CT chest with lung mass and extensive adenopath y, necrotic looking  Underlying COPD, Nicotine addiction, polysubstance use  Psych post suicide attempt  Post IR guided, skin lesion bx, results pending, favor EBUS/bronch if non dx  Oncology input noted, CT abd/pelvis, MRI head pending  nicotine replacement, smoking cessation, finish short course of abx  Lung exam improved, wean medrol, short prednisone taper, continue nebs, will need home nebulizer  Psych Tele input noted  Prognosis guarded     CT chest with lung mass and extensive adenopath y, necrotic looking  Underlying COPD, Nicotine addiction, polysubstance use  Psych post suicide attempt  sp02 93% room air, no bronchospasm today  Post IR guided, skin lesion bx, results pending, favor EBUS/bronch if non dx  Oncology input noted, CT abd/pelvis, MRI head pending  nicotine replacement, smoking cessation, finish short course of abx  Lung exam improved, wean medrol, short prednisone taper, continue nebs, will need home nebulizer  Psych Tele input noted  Prognosis guarded

## 2021-07-09 LAB — TM INTERPRETATION: SIGNIFICANT CHANGE UP

## 2021-07-09 PROCEDURE — 99231 SBSQ HOSP IP/OBS SF/LOW 25: CPT

## 2021-07-09 PROCEDURE — 99232 SBSQ HOSP IP/OBS MODERATE 35: CPT

## 2021-07-09 PROCEDURE — 99233 SBSQ HOSP IP/OBS HIGH 50: CPT

## 2021-07-09 RX ADMIN — ENOXAPARIN SODIUM 40 MILLIGRAM(S): 100 INJECTION SUBCUTANEOUS at 21:14

## 2021-07-09 RX ADMIN — PIPERACILLIN AND TAZOBACTAM 25 GRAM(S): 4; .5 INJECTION, POWDER, LYOPHILIZED, FOR SOLUTION INTRAVENOUS at 21:15

## 2021-07-09 RX ADMIN — Medication 100 MILLIGRAM(S): at 22:04

## 2021-07-09 RX ADMIN — PIPERACILLIN AND TAZOBACTAM 25 GRAM(S): 4; .5 INJECTION, POWDER, LYOPHILIZED, FOR SOLUTION INTRAVENOUS at 13:02

## 2021-07-09 RX ADMIN — Medication 3 MILLILITER(S): at 09:30

## 2021-07-09 RX ADMIN — Medication 2 MILLIGRAM(S): at 02:20

## 2021-07-09 RX ADMIN — Medication 3 MILLIGRAM(S): at 21:14

## 2021-07-09 RX ADMIN — Medication 1 MILLIGRAM(S): at 01:34

## 2021-07-09 RX ADMIN — Medication 1 PATCH: at 20:04

## 2021-07-09 RX ADMIN — Medication 1 PATCH: at 13:03

## 2021-07-09 RX ADMIN — Medication 30 MILLIGRAM(S): at 13:03

## 2021-07-09 RX ADMIN — OXYCODONE AND ACETAMINOPHEN 1 TABLET(S): 5; 325 TABLET ORAL at 21:44

## 2021-07-09 RX ADMIN — RISPERIDONE 1 MILLIGRAM(S): 4 TABLET ORAL at 18:13

## 2021-07-09 RX ADMIN — OXYCODONE AND ACETAMINOPHEN 1 TABLET(S): 5; 325 TABLET ORAL at 21:14

## 2021-07-09 NOTE — BH CONSULTATION LIAISON PROGRESS NOTE - CURRENT MEDICATION
MEDICATIONS  (STANDING):  ALBUTerol    90 MICROgram(s) HFA Inhaler 1 Puff(s) Inhalation every 4 hours  enoxaparin Injectable 40 milliGRAM(s) SubCutaneous at bedtime  melatonin 3 milliGRAM(s) Oral at bedtime  nicotine - 21 mG/24Hr(s) Patch 1 patch Transdermal daily  piperacillin/tazobactam IVPB.. 3.375 Gram(s) IV Intermittent every 8 hours  predniSONE   Tablet   Oral   predniSONE   Tablet 30 milliGRAM(s) Oral daily  risperiDONE   Tablet 1 milliGRAM(s) Oral two times a day  tiotropium 18 MICROgram(s) Capsule 1 Capsule(s) Inhalation daily  traZODone 100 milliGRAM(s) Oral at bedtime    MEDICATIONS  (PRN):  acetaminophen   Tablet .. 650 milliGRAM(s) Oral every 6 hours PRN Mild Pain (1 - 3), Moderate Pain (4 - 6)  LORazepam     Tablet 1 milliGRAM(s) Oral every 8 hours PRN Anxiety  LORazepam   Injectable 2 milliGRAM(s) IntraMuscular every 6 hours PRN Menacing behavior  oxycodone    5 mG/acetaminophen 325 mG 1 Tablet(s) Oral every 4 hours PRN Severe Pain (7 - 10)
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
## 2021-07-09 NOTE — BH CONSULTATION LIAISON PROGRESS NOTE - NSBHASSESSMENTFT_PSY_ALL_CORE
Upon presentation, patient is anxious about the lung biopsy for the R lung mass. He is refusing any procedure that requires a chest incision as his fear is of dying on the operation table. The patient would like to know more about the planned procedure, and he seems agreeable to hospice and palliative care when it was discussed as an alternative option.   PLAN: Health care team to explain the diagnostic procedure to Mr. Rhodes and continue follow-up.  Upon presentation, patient is anxious about the lung biopsy for the R lung mass. He is refusing any procedure that requires a chest incision as his fear is of dying on the operating table. The patient would like to know more about the planned procedure would agree to bronchoscopy but not open incision, and he seems agreeable to hospice and palliative care when it was discussed as an alternative option presuming metastatic disease.   PLAN: Health care team to explain the diagnostic procedure to Mr. Rhodes and continue follow-up.   discontinue 1 to 1 as he is not actively suicidal

## 2021-07-09 NOTE — BH CONSULTATION LIAISON PROGRESS NOTE - NSBHCHARTREVIEWVS_PSY_A_CORE FT
Vital Signs Last 24 Hrs  T(C): 36.7 (09 Jul 2021 09:00), Max: 36.7 (09 Jul 2021 09:00)  T(F): 98.1 (09 Jul 2021 09:00), Max: 98.1 (09 Jul 2021 09:00)  HR: 82 (09 Jul 2021 09:31) (75 - 104)  BP: 155/93 (09 Jul 2021 09:00) (124/83 - 155/93)  BP(mean): --  RR: 16 (09 Jul 2021 09:00) (16 - 20)  SpO2: 92% (09 Jul 2021 09:31) (92% - 97%)

## 2021-07-09 NOTE — BH CONSULTATION LIAISON PROGRESS NOTE - CASE SUMMARY
Pt. denies current suicide ideations, and work-up for the Right lung mass will be discussed further with the patient.  Pt. denies current suicide ideations, and work-up for the Right lung mass will be discussed further with the patient.  advise palliative medicine consult should patient opt for comfort care Continue psych meds Has decision making capacity

## 2021-07-10 PROCEDURE — 99232 SBSQ HOSP IP/OBS MODERATE 35: CPT

## 2021-07-10 RX ADMIN — OXYCODONE AND ACETAMINOPHEN 1 TABLET(S): 5; 325 TABLET ORAL at 15:00

## 2021-07-10 RX ADMIN — OXYCODONE AND ACETAMINOPHEN 1 TABLET(S): 5; 325 TABLET ORAL at 18:02

## 2021-07-10 RX ADMIN — OXYCODONE AND ACETAMINOPHEN 1 TABLET(S): 5; 325 TABLET ORAL at 13:59

## 2021-07-10 RX ADMIN — Medication 1 PATCH: at 07:49

## 2021-07-10 RX ADMIN — Medication 1 PATCH: at 13:51

## 2021-07-10 RX ADMIN — PIPERACILLIN AND TAZOBACTAM 25 GRAM(S): 4; .5 INJECTION, POWDER, LYOPHILIZED, FOR SOLUTION INTRAVENOUS at 13:51

## 2021-07-10 RX ADMIN — Medication 30 MILLIGRAM(S): at 06:25

## 2021-07-10 RX ADMIN — PIPERACILLIN AND TAZOBACTAM 25 GRAM(S): 4; .5 INJECTION, POWDER, LYOPHILIZED, FOR SOLUTION INTRAVENOUS at 06:25

## 2021-07-10 RX ADMIN — ENOXAPARIN SODIUM 40 MILLIGRAM(S): 100 INJECTION SUBCUTANEOUS at 21:31

## 2021-07-10 RX ADMIN — RISPERIDONE 1 MILLIGRAM(S): 4 TABLET ORAL at 18:02

## 2021-07-10 RX ADMIN — RISPERIDONE 1 MILLIGRAM(S): 4 TABLET ORAL at 06:25

## 2021-07-10 RX ADMIN — PIPERACILLIN AND TAZOBACTAM 25 GRAM(S): 4; .5 INJECTION, POWDER, LYOPHILIZED, FOR SOLUTION INTRAVENOUS at 21:29

## 2021-07-10 RX ADMIN — OXYCODONE AND ACETAMINOPHEN 1 TABLET(S): 5; 325 TABLET ORAL at 18:45

## 2021-07-10 RX ADMIN — Medication 1 PATCH: at 15:26
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
## 2021-07-11 PROCEDURE — 99231 SBSQ HOSP IP/OBS SF/LOW 25: CPT

## 2021-07-11 PROCEDURE — 99232 SBSQ HOSP IP/OBS MODERATE 35: CPT

## 2021-07-11 RX ORDER — SODIUM CHLORIDE 9 MG/ML
1000 INJECTION INTRAMUSCULAR; INTRAVENOUS; SUBCUTANEOUS
Refills: 0 | Status: DISCONTINUED | OUTPATIENT
Start: 2021-07-11 | End: 2021-07-11

## 2021-07-11 RX ORDER — BUDESONIDE AND FORMOTEROL FUMARATE DIHYDRATE 160; 4.5 UG/1; UG/1
2 AEROSOL RESPIRATORY (INHALATION)
Refills: 0 | Status: DISCONTINUED | OUTPATIENT
Start: 2021-07-11 | End: 2021-07-15

## 2021-07-11 RX ORDER — TIOTROPIUM BROMIDE 18 UG/1
1 CAPSULE ORAL; RESPIRATORY (INHALATION) DAILY
Refills: 0 | Status: DISCONTINUED | OUTPATIENT
Start: 2021-07-11 | End: 2021-07-15

## 2021-07-11 RX ORDER — BACITRACIN ZINC 500 UNIT/G
1 OINTMENT IN PACKET (EA) TOPICAL DAILY
Refills: 0 | Status: DISCONTINUED | OUTPATIENT
Start: 2021-07-11 | End: 2021-07-15

## 2021-07-11 RX ADMIN — OXYCODONE AND ACETAMINOPHEN 1 TABLET(S): 5; 325 TABLET ORAL at 09:39

## 2021-07-11 RX ADMIN — PIPERACILLIN AND TAZOBACTAM 25 GRAM(S): 4; .5 INJECTION, POWDER, LYOPHILIZED, FOR SOLUTION INTRAVENOUS at 04:34

## 2021-07-11 RX ADMIN — Medication 1 APPLICATION(S): at 17:43

## 2021-07-11 RX ADMIN — Medication 1 PATCH: at 19:15

## 2021-07-11 RX ADMIN — Medication 1 PATCH: at 08:07

## 2021-07-11 RX ADMIN — RISPERIDONE 1 MILLIGRAM(S): 4 TABLET ORAL at 09:02

## 2021-07-11 RX ADMIN — Medication 3 MILLIGRAM(S): at 21:20

## 2021-07-11 RX ADMIN — Medication 650 MILLIGRAM(S): at 04:34

## 2021-07-11 RX ADMIN — OXYCODONE AND ACETAMINOPHEN 1 TABLET(S): 5; 325 TABLET ORAL at 09:02

## 2021-07-11 RX ADMIN — Medication 1 PATCH: at 14:17

## 2021-07-11 RX ADMIN — Medication 650 MILLIGRAM(S): at 21:20

## 2021-07-11 RX ADMIN — RISPERIDONE 1 MILLIGRAM(S): 4 TABLET ORAL at 17:43

## 2021-07-11 RX ADMIN — OXYCODONE AND ACETAMINOPHEN 1 TABLET(S): 5; 325 TABLET ORAL at 16:16

## 2021-07-11 RX ADMIN — BUDESONIDE AND FORMOTEROL FUMARATE DIHYDRATE 2 PUFF(S): 160; 4.5 AEROSOL RESPIRATORY (INHALATION) at 21:36

## 2021-07-11 RX ADMIN — Medication 20 MILLIGRAM(S): at 04:38

## 2021-07-11 RX ADMIN — PIPERACILLIN AND TAZOBACTAM 25 GRAM(S): 4; .5 INJECTION, POWDER, LYOPHILIZED, FOR SOLUTION INTRAVENOUS at 16:17

## 2021-07-11 RX ADMIN — TIOTROPIUM BROMIDE 1 CAPSULE(S): 18 CAPSULE ORAL; RESPIRATORY (INHALATION) at 17:43

## 2021-07-11 RX ADMIN — Medication 1 PATCH: at 17:10
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
## 2021-07-12 LAB
CULTURE RESULTS: SIGNIFICANT CHANGE UP
CULTURE RESULTS: SIGNIFICANT CHANGE UP
SPECIMEN SOURCE: SIGNIFICANT CHANGE UP
SPECIMEN SOURCE: SIGNIFICANT CHANGE UP

## 2021-07-12 PROCEDURE — 99232 SBSQ HOSP IP/OBS MODERATE 35: CPT

## 2021-07-12 PROCEDURE — 99231 SBSQ HOSP IP/OBS SF/LOW 25: CPT

## 2021-07-12 PROCEDURE — 71045 X-RAY EXAM CHEST 1 VIEW: CPT | Mod: 26

## 2021-07-12 PROCEDURE — 99233 SBSQ HOSP IP/OBS HIGH 50: CPT

## 2021-07-12 RX ADMIN — PIPERACILLIN AND TAZOBACTAM 25 GRAM(S): 4; .5 INJECTION, POWDER, LYOPHILIZED, FOR SOLUTION INTRAVENOUS at 17:24

## 2021-07-12 RX ADMIN — OXYCODONE AND ACETAMINOPHEN 1 TABLET(S): 5; 325 TABLET ORAL at 00:11

## 2021-07-12 RX ADMIN — Medication 650 MILLIGRAM(S): at 21:36

## 2021-07-12 RX ADMIN — RISPERIDONE 1 MILLIGRAM(S): 4 TABLET ORAL at 17:28

## 2021-07-12 RX ADMIN — PIPERACILLIN AND TAZOBACTAM 25 GRAM(S): 4; .5 INJECTION, POWDER, LYOPHILIZED, FOR SOLUTION INTRAVENOUS at 00:16

## 2021-07-12 RX ADMIN — TIOTROPIUM BROMIDE 1 CAPSULE(S): 18 CAPSULE ORAL; RESPIRATORY (INHALATION) at 09:22

## 2021-07-12 RX ADMIN — RISPERIDONE 1 MILLIGRAM(S): 4 TABLET ORAL at 05:41

## 2021-07-12 RX ADMIN — Medication 1 APPLICATION(S): at 08:54

## 2021-07-12 RX ADMIN — Medication 650 MILLIGRAM(S): at 03:30

## 2021-07-12 RX ADMIN — Medication 650 MILLIGRAM(S): at 22:20

## 2021-07-12 RX ADMIN — Medication 1 PATCH: at 12:17

## 2021-07-12 RX ADMIN — PIPERACILLIN AND TAZOBACTAM 25 GRAM(S): 4; .5 INJECTION, POWDER, LYOPHILIZED, FOR SOLUTION INTRAVENOUS at 08:40

## 2021-07-12 RX ADMIN — Medication 1 PATCH: at 08:53

## 2021-07-12 RX ADMIN — Medication 20 MILLIGRAM(S): at 05:41

## 2021-07-12 RX ADMIN — Medication 1 PATCH: at 19:30

## 2021-07-12 RX ADMIN — Medication 1 PATCH: at 17:43

## 2021-07-12 RX ADMIN — BUDESONIDE AND FORMOTEROL FUMARATE DIHYDRATE 2 PUFF(S): 160; 4.5 AEROSOL RESPIRATORY (INHALATION) at 09:23

## 2021-07-12 RX ADMIN — BUDESONIDE AND FORMOTEROL FUMARATE DIHYDRATE 2 PUFF(S): 160; 4.5 AEROSOL RESPIRATORY (INHALATION) at 20:28

## 2021-07-12 NOTE — PROGRESS NOTE ADULT - PROBLEM SELECTOR PLAN 1
F/u skin biopsy results.    Plan for FB/EBUS next week, possibly monday. Patient more agreeable to procedure now.     Thoracic surgery to follow.     d/w Dr. Amaya.
Flex bronch/EBUS Dr. Amaya at some point this week   As per Dr. Amaya will F/U with pathology and decided bronch/EBUS date  Continue Zosyn for PNA  Pulmonary following  Continue Prednisone taper  Encourage the use of I/S, CDBE, OOB to chair, ambulate as tolerated  Continue Proventil & Spiriva  no need for repeat CXR in the AM   Awaiting skin biopsy results  Continue Lovenox for DVT prophylaxis  Continue primary care as per primary team  Thoracic surgery to follow.     Discussed plan with Dr. Amaya & Thoracic surgery in am rounds.
Flex bronch/EBUS possibly next week with Dr. Amaya  Continue Zosyn for PNA  Pulmonary following  Continue Prednisone taper  Encourage the use of I/S, CDBE, OOB to chair, ambulate as tolerated  Continue Proventil & Spiriva  Repeat chest xray tomorrow morning  Awaiting skin biopsy results  Continue Lovenox for DVT prophylaxis  Continue primary care as per primary team  Thoracic surgery to follow.   Discussed plan with Dr. Lopez & Thoracic surgery in am rounds.
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
## 2021-07-12 NOTE — PROGRESS NOTE ADULT - ASSESSMENT
CT chest with lung mass and extensive adenopathy, necrotic looking  Pneumonia  Underlying COPD/bullous disease  Nicotine addiction, polysubstance use  Psych post suicide attempt  sp02 93% room air, no bronchospasm today    Rec:     Post IR guided, skin lesion bx, results pending, favor EBUS/bronch if non dx  Oncology w/u  Nicotine replacement, smoking cessation  Complete abx  Taper steroids  Eventual prednisone taper  On BD therapy; will need home nebulizer  Prognosis guarded  Clinically improved

## 2021-07-12 NOTE — DIETITIAN INITIAL EVALUATION ADULT. - ORAL INTAKE PTA/DIET HISTORY
Pt reports good po intake, feels like he lost weight but cannot quantify. Pt agreed to have extra protein portions. Paper products being provided. Pt on a regular diet.
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
## 2021-07-13 LAB
CULTURE RESULTS: SIGNIFICANT CHANGE UP
SPECIMEN SOURCE: SIGNIFICANT CHANGE UP
SURGICAL PATHOLOGY STUDY: SIGNIFICANT CHANGE UP

## 2021-07-13 PROCEDURE — 99232 SBSQ HOSP IP/OBS MODERATE 35: CPT

## 2021-07-13 PROCEDURE — 99233 SBSQ HOSP IP/OBS HIGH 50: CPT

## 2021-07-13 RX ADMIN — Medication 1 PATCH: at 19:47

## 2021-07-13 RX ADMIN — Medication 1 PATCH: at 10:36

## 2021-07-13 RX ADMIN — PIPERACILLIN AND TAZOBACTAM 25 GRAM(S): 4; .5 INJECTION, POWDER, LYOPHILIZED, FOR SOLUTION INTRAVENOUS at 00:25

## 2021-07-13 RX ADMIN — Medication 1 APPLICATION(S): at 10:36

## 2021-07-13 RX ADMIN — Medication 1 PATCH: at 07:00

## 2021-07-13 RX ADMIN — BUDESONIDE AND FORMOTEROL FUMARATE DIHYDRATE 2 PUFF(S): 160; 4.5 AEROSOL RESPIRATORY (INHALATION) at 21:12

## 2021-07-13 RX ADMIN — Medication 3 MILLIGRAM(S): at 21:15

## 2021-07-13 RX ADMIN — Medication 2 MILLIGRAM(S): at 21:16

## 2021-07-13 RX ADMIN — OXYCODONE AND ACETAMINOPHEN 1 TABLET(S): 5; 325 TABLET ORAL at 10:35

## 2021-07-13 RX ADMIN — RISPERIDONE 1 MILLIGRAM(S): 4 TABLET ORAL at 05:21

## 2021-07-13 RX ADMIN — Medication 1 PATCH: at 10:37

## 2021-07-13 RX ADMIN — BUDESONIDE AND FORMOTEROL FUMARATE DIHYDRATE 2 PUFF(S): 160; 4.5 AEROSOL RESPIRATORY (INHALATION) at 08:36

## 2021-07-13 RX ADMIN — OXYCODONE AND ACETAMINOPHEN 1 TABLET(S): 5; 325 TABLET ORAL at 09:16

## 2021-07-13 RX ADMIN — PIPERACILLIN AND TAZOBACTAM 25 GRAM(S): 4; .5 INJECTION, POWDER, LYOPHILIZED, FOR SOLUTION INTRAVENOUS at 09:01

## 2021-07-13 RX ADMIN — RISPERIDONE 1 MILLIGRAM(S): 4 TABLET ORAL at 16:07

## 2021-07-13 RX ADMIN — TIOTROPIUM BROMIDE 1 CAPSULE(S): 18 CAPSULE ORAL; RESPIRATORY (INHALATION) at 08:36

## 2021-07-13 RX ADMIN — Medication 10 MILLIGRAM(S): at 05:21

## 2021-07-13 NOTE — PROGRESS NOTE ADULT - TIME BILLING
greater than 50% of time spent reviewing labs, notes, orders and radiographs, coordinating care  discussed with pt, nursing, social service
greater than 50% of time spent reviewing labs, notes, orders and radiographs, coordinating care  pt at bedside,

## 2021-07-13 NOTE — CHART NOTE - NSCHARTNOTEFT_GEN_A_CORE
56M, pmhx ETOH abuse, active smoker, bipolar dz, schizophrenia, p/w suicidal ideations, recent ED visits for SOB, found to have spiculated RML nodule with multiple lymph nodes, s/p biopsy of R posterior abscess, CTS consulted for Lung biopsy    Final Diagnosis  Right chest wall mass, biopsy:  -Poorly differentiated carcinoma     HEME/ ONC following   as per Dr. Amaya d/w oncology no additional biopsies necessary  NO EBUS/ Bronch necessary    CTS will sign off consult PRN     Plan of care d/w Dr. Amaya

## 2021-07-13 NOTE — PROGRESS NOTE ADULT - NUTRITIONAL ASSESSMENT
This patient has been assessed with a concern for Malnutrition and has been determined to have a diagnosis/diagnoses of Moderate protein-calorie malnutrition.    This patient is being managed with:   Diet Regular-  Supplement Feeding Modality:  Oral  Ensure Enlive Cans or Servings Per Day:  1       Frequency:  Two Times a day  Entered: Jul 12 2021  3:08PM

## 2021-07-13 NOTE — PROGRESS NOTE ADULT - ASSESSMENT
CT chest with lung mass and extensive adenopathy, skin bx with poorly differentiated carcinoma  Underlying COPD/bullous disease  Nicotine addiction, polysubstance use  Psych post suicide attempt    Oncology input noted  Needs social service support," I have no where to go"  re : treatment and residence  finish prednisone taper  continue nebs  home 02 eval  Home nebulizer if able  Advair and Spiriva prn rescue albuterol  Will follow up prn
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
## 2021-07-14 PROCEDURE — 99232 SBSQ HOSP IP/OBS MODERATE 35: CPT

## 2021-07-14 RX ADMIN — RISPERIDONE 1 MILLIGRAM(S): 4 TABLET ORAL at 18:31

## 2021-07-14 RX ADMIN — Medication 1 PATCH: at 20:03

## 2021-07-14 RX ADMIN — Medication 1 PATCH: at 11:24

## 2021-07-14 RX ADMIN — TIOTROPIUM BROMIDE 1 CAPSULE(S): 18 CAPSULE ORAL; RESPIRATORY (INHALATION) at 09:57

## 2021-07-14 RX ADMIN — Medication 1 PATCH: at 11:35

## 2021-07-14 RX ADMIN — RISPERIDONE 1 MILLIGRAM(S): 4 TABLET ORAL at 05:46

## 2021-07-14 RX ADMIN — BUDESONIDE AND FORMOTEROL FUMARATE DIHYDRATE 2 PUFF(S): 160; 4.5 AEROSOL RESPIRATORY (INHALATION) at 09:57

## 2021-07-14 RX ADMIN — Medication 1 APPLICATION(S): at 11:25

## 2021-07-14 RX ADMIN — Medication 10 MILLIGRAM(S): at 05:46

## 2021-07-14 NOTE — PROGRESS NOTE ADULT - REASON FOR ADMISSION
Right lung mass with PNA and suicide attempt

## 2021-07-14 NOTE — PROGRESS NOTE ADULT - PROVIDER SPECIALTY LIST ADULT
Hospitalist
Pulmonology
Heme/Onc
Hospitalist
Hospitalist
Thoracic Surgery
Hospitalist
Pulmonology
Hospitalist
Pulmonology
Thoracic Surgery
Thoracic Surgery

## 2021-07-14 NOTE — PROGRESS NOTE ADULT - ATTENDING COMMENTS
seen and evaluated with NP  awaiting EBUS date.  c/w medical management.
agree with NP    lung CA with mets, refusing further tests  dc planning SW re placement.
seen with NP  agree with above    poorly differentiated carcinoma.  oncology consulted  social work follow up given his social issues
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
## 2021-07-14 NOTE — PROGRESS NOTE ADULT - SUBJECTIVE AND OBJECTIVE BOX
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 1 Capsule(s) Inhalation daily  traZODone 100 milliGRAM(s) Oral at bedtime      PHYSICAL EXAM  General: no acute distress  Neurology: alert and oriented x 3, nonfocal, no gross deficits  Respiratory: Diminished at the right base  CV: regular rate and rhythm, normal S1, S2  Abdomen: soft, nontender, nondistended, positive bowel sounds  Extremities: warm, well perfused. no edema. + DP pulses      I&O's Detail      Weights:  Daily     Daily   Admit Wt: Drug Dosing Weight  Height (cm): 182.9 (09 Jul 2021 11:28)  Weight (kg): 75 (09 Jul 2021 11:28)  BMI (kg/m2): 22.4 (09 Jul 2021 11:28)  BSA (m2): 1.97 (09 Jul 2021 11:28)    LABS                              CAPILLARY BLOOD GLUCOSE        Culture - Acid Fast - Tissue w/Smear (07.08.21 @ 16:25)    Specimen Source: .Tissue Other, right back mass bx    Culture Results:   Culture is being performed.    Culture - Tissue with Gram Stain (07.08.21 @ 16:25)    Gram Stain:   No polymorphonuclear cells seen per low power field  No organisms seen per oil power field    Specimen Source: .Tissue Other, right back mass bx    Culture Results:   Moderate Streptococcus agalactiae (Group B) isolated  Group B streptococci are susceptible to ampicillin,  penicillin and cefazolin, but may be resistant to  erythromycin and clindamycin.  Recommendations for intrapartum prophylaxis for Group B  streptococci are penicillin or ampicillin.      < from: CT Chest w/ IV Cont (07.07.21 @ 07:43) >   EXAM:  CT CHEST IC                          PROCEDURE DATE:  07/07/2021          INTERPRETATION:  CLINICAL INFORMATION: Solitary pulmonary nodule, abnormal chest x-ray    COMPARISON: Chest x-ray 2 days prior    CONTRAST/COMPLICATIONS:  IV Contrast: Omnipaque 300  95 cc administered   5 cc discarded  Oral Contrast: NONE  Complications: None reported at time of study completion    PROCEDURE:  CT of the Chest was performed.  Sagittal and coronal reformats were performed.    FINDINGS:    LUNGS ANDAIRWAYS: There is severe narrowing of the bronchus intermedius and left mainstem bronchus secondary to large adjacent subcarinal lymphadenopathy. Severe background emphysema. Lateral segment right middle lobe spiculated nodule measures 3.1 x 2.6 cm abutting the major fissure and possibly crossing the fissure. Bibasilar patchy airspace disease, likely infectious.  PLEURA: No pleural abnormality.  VESSELS: Normal caliber aorta. Main pulmonary arteries are patent. Slight mass effect on the SVC by lymphadenopathy without occlusion or collaterals.  HEART: Normal heart size. No pericardial effusion.  MEDIASTINUM AND RAULITO: Extensive hilar and mediastinal lymphadenopathy. For reference:  -Subcarinal lymph node 4.8 x 4.7 cm (3-100)  -Right upper paratracheal lymph node 2.8 x 2.4 cm (3-47)  CHEST WALL AND LOW1ER NECK: Right posterior chest wall metastasis with suggestion of overlying skin ulceration measuring 3.3 x 2.1 cm (3-108)  VISUALIZED UPPER ABDOMEN: 2.4 x 1.8 cm indeterminate left adrenal nodule. Bilateral renal cortical scarring.  BONES: No aggressive lesion.    IMPRESSION:  *  Lateral segment right middle lobe spiculated nodule measures 3.1 x 2.6 cm abutting the major fissure and possibly crossing the fissure indicative of primary lungneoplasm.  *  Extensive mediastinal and hilar lymphadenopathy extending to the right upper paratracheal nodes.  *  Large subcarinal lymph node results in severe narrowing of the bronchus intermedius and left mainstem bronchus.  *  2.4 x 1.8 cm indeterminate left adrenal nodule  *  Right posterior chest wall metastasis with suggestion of overlying skin ulceration measuring 3.3 x 2.1 cm (3-108). Correlate with physical exam and tissue sampling.  *  Bibasilar patchy airspace disease, likely infectious.      --- End of Report ---    REYNA GREER MD; Attending Radiologist  This document has been electronically signed. Jul 7 2021  8:30AM    < end of copied text >      PAST MEDICAL & SURGICAL HISTORY:  No pertinent past medical history    Schizophrenia    Bipolar disorder    Depression    No significant past surgical history

## 2021-07-14 NOTE — PROGRESS NOTE ADULT - NSICDXPILOT_GEN_ALL_CORE
[place]

## 2021-07-14 NOTE — PROVIDER CONTACT NOTE (OTHER) - ASSESSMENT
Patient alert and oriented x 4 OOB self with no visible s/s of distress. Patient refused all evening medications. Education provided about benefit of medication and potential risk of not taking them. Not effective AEB patient still refused. Provider made aware, no new order or instructions.

## 2021-07-15 ENCOUNTER — TRANSCRIPTION ENCOUNTER (OUTPATIENT)
Age: 56
End: 2021-07-15

## 2021-07-15 VITALS
SYSTOLIC BLOOD PRESSURE: 106 MMHG | HEART RATE: 100 BPM | OXYGEN SATURATION: 92 % | DIASTOLIC BLOOD PRESSURE: 78 MMHG | TEMPERATURE: 98 F | RESPIRATION RATE: 18 BRPM

## 2021-07-15 PROCEDURE — 80048 BASIC METABOLIC PNL TOTAL CA: CPT

## 2021-07-15 PROCEDURE — 87075 CULTR BACTERIA EXCEPT BLOOD: CPT

## 2021-07-15 PROCEDURE — 88333 PATH CONSLTJ SURG CYTO XM 1: CPT

## 2021-07-15 PROCEDURE — 85027 COMPLETE CBC AUTOMATED: CPT

## 2021-07-15 PROCEDURE — 81003 URINALYSIS AUTO W/O SCOPE: CPT

## 2021-07-15 PROCEDURE — 36415 COLL VENOUS BLD VENIPUNCTURE: CPT

## 2021-07-15 PROCEDURE — 87206 SMEAR FLUORESCENT/ACID STAI: CPT

## 2021-07-15 PROCEDURE — 74177 CT ABD & PELVIS W/CONTRAST: CPT

## 2021-07-15 PROCEDURE — 80307 DRUG TEST PRSMV CHEM ANLYZR: CPT

## 2021-07-15 PROCEDURE — 85610 PROTHROMBIN TIME: CPT

## 2021-07-15 PROCEDURE — 99239 HOSP IP/OBS DSCHRG MGMT >30: CPT

## 2021-07-15 PROCEDURE — 87077 CULTURE AEROBIC IDENTIFY: CPT

## 2021-07-15 PROCEDURE — 87116 MYCOBACTERIA CULTURE: CPT

## 2021-07-15 PROCEDURE — 80053 COMPREHEN METABOLIC PANEL: CPT

## 2021-07-15 PROCEDURE — U0003: CPT

## 2021-07-15 PROCEDURE — U0005: CPT

## 2021-07-15 PROCEDURE — 88342 IMHCHEM/IMCYTCHM 1ST ANTB: CPT

## 2021-07-15 PROCEDURE — 76942 ECHO GUIDE FOR BIOPSY: CPT

## 2021-07-15 PROCEDURE — 96374 THER/PROPH/DIAG INJ IV PUSH: CPT

## 2021-07-15 PROCEDURE — 94640 AIRWAY INHALATION TREATMENT: CPT

## 2021-07-15 PROCEDURE — 87015 SPECIMEN INFECT AGNT CONCNTJ: CPT

## 2021-07-15 PROCEDURE — 71045 X-RAY EXAM CHEST 1 VIEW: CPT

## 2021-07-15 PROCEDURE — 99285 EMERGENCY DEPT VISIT HI MDM: CPT | Mod: 25

## 2021-07-15 PROCEDURE — 86769 SARS-COV-2 COVID-19 ANTIBODY: CPT

## 2021-07-15 PROCEDURE — 88341 IMHCHEM/IMCYTCHM EA ADD ANTB: CPT

## 2021-07-15 PROCEDURE — 96372 THER/PROPH/DIAG INJ SC/IM: CPT | Mod: XU

## 2021-07-15 PROCEDURE — 85730 THROMBOPLASTIN TIME PARTIAL: CPT

## 2021-07-15 PROCEDURE — 93005 ELECTROCARDIOGRAM TRACING: CPT

## 2021-07-15 PROCEDURE — 88305 TISSUE EXAM BY PATHOLOGIST: CPT

## 2021-07-15 PROCEDURE — 87040 BLOOD CULTURE FOR BACTERIA: CPT

## 2021-07-15 PROCEDURE — 71260 CT THORAX DX C+: CPT

## 2021-07-15 PROCEDURE — 87205 SMEAR GRAM STAIN: CPT

## 2021-07-15 PROCEDURE — 83735 ASSAY OF MAGNESIUM: CPT

## 2021-07-15 PROCEDURE — 85025 COMPLETE CBC W/AUTO DIFF WBC: CPT

## 2021-07-15 PROCEDURE — 87070 CULTURE OTHR SPECIMN AEROBIC: CPT

## 2021-07-15 RX ORDER — BACITRACIN ZINC 500 UNIT/G
1 OINTMENT IN PACKET (EA) TOPICAL
Qty: 1 | Refills: 0
Start: 2021-07-15 | End: 2021-07-24

## 2021-07-15 RX ORDER — RISPERIDONE 4 MG/1
1 TABLET ORAL
Qty: 60 | Refills: 0
Start: 2021-07-15 | End: 2021-08-13

## 2021-07-15 RX ORDER — BUDESONIDE AND FORMOTEROL FUMARATE DIHYDRATE 160; 4.5 UG/1; UG/1
2 AEROSOL RESPIRATORY (INHALATION)
Qty: 1 | Refills: 0
Start: 2021-07-15 | End: 2021-08-13

## 2021-07-15 RX ORDER — TRAZODONE HCL 50 MG
1 TABLET ORAL
Qty: 30 | Refills: 0
Start: 2021-07-15 | End: 2021-08-13

## 2021-07-15 RX ORDER — TIOTROPIUM BROMIDE 18 UG/1
1 CAPSULE ORAL; RESPIRATORY (INHALATION)
Qty: 1 | Refills: 0
Start: 2021-07-15 | End: 2021-08-13

## 2021-07-15 RX ORDER — ALBUTEROL 90 UG/1
1 AEROSOL, METERED ORAL
Qty: 1 | Refills: 0
Start: 2021-07-15 | End: 2021-08-13

## 2021-07-15 RX ADMIN — TIOTROPIUM BROMIDE 1 CAPSULE(S): 18 CAPSULE ORAL; RESPIRATORY (INHALATION) at 08:03

## 2021-07-15 RX ADMIN — Medication 1 PATCH: at 08:08

## 2021-07-15 RX ADMIN — Medication 1 PATCH: at 08:07

## 2021-07-15 RX ADMIN — Medication 650 MILLIGRAM(S): at 01:16

## 2021-07-15 RX ADMIN — Medication 1 PATCH: at 08:04

## 2021-07-15 RX ADMIN — BUDESONIDE AND FORMOTEROL FUMARATE DIHYDRATE 2 PUFF(S): 160; 4.5 AEROSOL RESPIRATORY (INHALATION) at 08:03

## 2021-07-15 NOTE — DISCHARGE NOTE PROVIDER - NSDCMRMEDTOKEN_GEN_ALL_CORE_FT
albuterol 90 mcg/inh inhalation aerosol: 1 puff(s) inhaled every 4 hours  bacitracin 500 units/g topical ointment: 1 application topically once a day  budesonide-formoterol 160 mcg-4.5 mcg/inh inhalation aerosol: 2 puff(s) inhaled 2 times a day   risperiDONE 1 mg oral tablet: 1 tab(s) orally 2 times a day  tiotropium 18 mcg inhalation capsule: 1 cap(s) inhaled once a day  traZODone 100 mg oral tablet: 1 tab(s) orally once a day (at bedtime)

## 2021-07-15 NOTE — DISCHARGE NOTE PROVIDER - CARE PROVIDERS DIRECT ADDRESSES
,DirectAddress_Unknown,[email or web address]

## 2021-07-15 NOTE — CHART NOTE - NSCHARTNOTEFT_GEN_A_CORE
Source: Patient [ ]  Family [ ]   other [x ]    Current Diet: Diet, Regular:   Supplement Feeding Modality:  Oral  Ensure Enlive Cans or Servings Per Day:  1       Frequency:  Two Times a day (07-12-21 @ 15:08)    PO intake:  Pt with good po intake at meals; consumes ~100% per nursing flowsheets.    Current Weight:   (7/9) 165.3 lbs    % Weight Change - no new weight to assess, will continue to monitor. No edema noted.     Pertinent Medications: MEDICATIONS  (STANDING):  ALBUTerol    90 MICROgram(s) HFA Inhaler 1 Puff(s) Inhalation every 4 hours  BACItracin   Ointment 1 Application(s) Topical daily  budesonide 160 MICROgram(s)/formoterol 4.5 MICROgram(s) Inhaler 2 Puff(s) Inhalation two times a day  enoxaparin Injectable 40 milliGRAM(s) SubCutaneous at bedtime  melatonin 3 milliGRAM(s) Oral at bedtime  nicotine - 21 mG/24Hr(s) Patch 1 patch Transdermal daily  risperiDONE   Tablet 1 milliGRAM(s) Oral two times a day  tiotropium 18 MICROgram(s) Capsule 1 Capsule(s) Inhalation daily  tiotropium 18 MICROgram(s) Capsule 1 Capsule(s) Inhalation daily  traZODone 100 milliGRAM(s) Oral at bedtime    MEDICATIONS  (PRN):  acetaminophen   Tablet .. 650 milliGRAM(s) Oral every 6 hours PRN Mild Pain (1 - 3), Moderate Pain (4 - 6)    Pertinent Labs: NA    Skin: no skin breakdown noted     Nutrition focused physical exam previously conducted - found signs of malnutrition [ ]absent [ x]present    Subcutaneous fat loss: [ ] Orbital fat pads region, [x ]Buccal fat region, [ ]Triceps region,  [ ]Ribs region    Muscle wasting: [x ]Temples region, [ ]Clavicle region, [ ]Shoulder region, [ ]Scapula region, [ ]Interosseous region,  [ ]thigh region, [ ]Calf region    Current Nutrition Diagnosis: Pt remains at nutrition risk secondary to moderate protein calorie malnutrition (chronic) related to inability to consume increased protein energy intake in setting of right lung mass with PNA and suicidal ideations/attempt as evidenced by pt with moderate muscle wasting/fat loss, previously reported unintentional wt loss (unsure how much) and likely meeting <75% estimated energy intake > 1 month.   Pt with good po intake at this time; consuming 100% at meals per nursing flowsheets.  Aware pt refusing medications/tests noted.  RD to remain available.    Recommendations:   1. Continue with Ensure tid  2. RX: MVI daily   3. Monitor daily wts     Monitoring and Evaluation:   [x ] PO intake [x ] Tolerance to diet prescription [X] Weights  [X] Follow up per protocol [X] Labs:

## 2021-07-15 NOTE — DISCHARGE NOTE PROVIDER - CARE PROVIDER_API CALL
Breanna Williamson)  Hematology; Internal Medicine; Medical Oncology  [address] [place] [address]  Phone: [phone]  Fax: [phone]  Follow Up Time: 2 weeks    Efren Alarcon)  Psychiatry  [address] [place] [address]  Phone: [phone]  Fax: [phone]  Follow Up Time: 2 weeks    Leonardo Javier (DO)  Critical Care Medicine; Internal Medicine; Pulmonary Disease  [address] [facility], [address]  [place] [address]  Phone: [phone]  Fax: [phone]  Follow Up Time: 2 weeks    Azul Amaya)  Thoracic and Cardiac Surgery  [address] [place] [address]  Phone: [phone]  Fax: [phone]  Follow Up Time: 2 weeks

## 2021-07-15 NOTE — DISCHARGE NOTE PROVIDER - DETAILS OF MALNUTRITION DIAGNOSIS/DIAGNOSES
This patient has been assessed with a concern for Malnutrition and was treated during this hospitalization for the following Nutrition diagnosis/diagnoses:     -  07/12/2021: Moderate protein-calorie malnutrition

## 2021-07-15 NOTE — DISCHARGE NOTE NURSING/CASE MANAGEMENT/SOCIAL WORK - PATIENT PORTAL LINK FT
You can access the FollowMyHealth Patient Portal offered by [facility] by registering at the following website: [email or web address]. By joining [facility]’s FollowMyHealth portal, you will also be able to view your health information using other applications (apps) compatible with our system.
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
## 2021-07-15 NOTE — DISCHARGE NOTE PROVIDER - PROVIDER TOKENS
PROVIDER:[TOKEN:[[id number]],FOLLOWUP:[2 weeks]],PROVIDER:[TOKEN:[[id number]],FOLLOWUP:[2 weeks]],PROVIDER:[TOKEN:[[id number]],FOLLOWUP:[2 weeks]],PROVIDER:[TOKEN:[[id number]],FOLLOWUP:[2 weeks]]

## 2021-07-15 NOTE — DISCHARGE NOTE PROVIDER - NSDCCPCAREPLAN_GEN_ALL_CORE_FT
PRINCIPAL DISCHARGE DIAGNOSIS  Diagnosis: Suicide attempt  Assessment and Plan of Treatment: Continue Trazadone and Risperidone.  Follow up with Psychiatry in 1-2 weeks.      SECONDARY DISCHARGE DIAGNOSES  Diagnosis: Lung mass  Assessment and Plan of Treatment: Follow up with Heme/onc and Pulmonary in 1-2 weeks.  Will Need EBUS, follow up with CT surgery.    Diagnosis: COPD exacerbation  Assessment and Plan of Treatment: Continue inhalers as instructed.  Follow up with Pulmonary in 1-2 weeks. Smoking cessation again discussed and stressed.

## 2021-07-19 ENCOUNTER — EMERGENCY (EMERGENCY)
Facility: HOSPITAL | Age: 56
LOS: 1 days | Discharge: LEFT BEFORE DISCHARGE | End: 2021-07-19
Attending: EMERGENCY MEDICINE
Payer: COMMERCIAL

## 2021-07-19 VITALS
OXYGEN SATURATION: 97 % | TEMPERATURE: 98 F | RESPIRATION RATE: 18 BRPM | DIASTOLIC BLOOD PRESSURE: 73 MMHG | WEIGHT: 154.98 LBS | SYSTOLIC BLOOD PRESSURE: 120 MMHG | HEART RATE: 98 BPM | HEIGHT: 72 IN

## 2021-07-19 VITALS
OXYGEN SATURATION: 94 % | DIASTOLIC BLOOD PRESSURE: 67 MMHG | SYSTOLIC BLOOD PRESSURE: 118 MMHG | TEMPERATURE: 98 F | HEART RATE: 84 BPM | RESPIRATION RATE: 18 BRPM

## 2021-07-19 PROBLEM — F31.9 BIPOLAR DISORDER, UNSPECIFIED: Chronic | Status: ACTIVE | Noted: 2021-07-07

## 2021-07-19 PROBLEM — F20.9 SCHIZOPHRENIA, UNSPECIFIED: Chronic | Status: ACTIVE | Noted: 2021-07-07

## 2021-07-19 PROBLEM — F32.9 MAJOR DEPRESSIVE DISORDER, SINGLE EPISODE, UNSPECIFIED: Chronic | Status: ACTIVE | Noted: 2021-07-07

## 2021-07-19 PROCEDURE — 93010 ELECTROCARDIOGRAM REPORT: CPT

## 2021-07-19 PROCEDURE — 99284 EMERGENCY DEPT VISIT MOD MDM: CPT | Mod: 25

## 2021-07-19 PROCEDURE — 71045 X-RAY EXAM CHEST 1 VIEW: CPT | Mod: 26

## 2021-07-19 PROCEDURE — 71045 X-RAY EXAM CHEST 1 VIEW: CPT

## 2021-07-19 PROCEDURE — 99284 EMERGENCY DEPT VISIT MOD MDM: CPT

## 2021-07-19 PROCEDURE — 93005 ELECTROCARDIOGRAM TRACING: CPT

## 2021-07-19 RX ORDER — ASPIRIN/CALCIUM CARB/MAGNESIUM 324 MG
162 TABLET ORAL ONCE
Refills: 0 | Status: COMPLETED | OUTPATIENT
Start: 2021-07-19 | End: 2021-07-19

## 2021-07-19 RX ADMIN — Medication 162 MILLIGRAM(S): at 20:33

## 2021-07-19 NOTE — ED PROVIDER NOTE - PHYSICAL EXAMINATION
Alert, lucid, and in no apparent distress. Pt is normocephalic, atraumatic.  Pupils are equal, round, lips pink, moist mucous membranes, tongue midline. Neck supple.   Lungs clear to ausultation. Heart regular rate and rhythm, normal S1, S2.  Abdomen is soft, nontender, no pulsatile mass, no masses.   Non-focal sensory, 5 out of 5 motor strength, no dysmetria, fluent, goal directed. Skin without rash, purpura, eccyhmosis  Normal mentation, does not appear agitated

## 2021-07-19 NOTE — ED PROVIDER NOTE - OBJECTIVE STATEMENT
55 yo male pmh copd , h/o drug abuse comes to ed  1month history of sob , with left sided chest  pain with cough; pt denies fever, chills , nausea or vomiting; pt txed pain with ibuprofen without relief of symptoms

## 2021-07-19 NOTE — ED ADULT TRIAGE NOTE - CHIEF COMPLAINT QUOTE
Pt states was here 4 days ago for same pain he is experiencing today which is, inspiratory and made worse with palpation to LUQ abdomen. Pt also states he would like a cyst looked at on his back.

## 2021-07-19 NOTE — ED PROVIDER NOTE - PATIENT PORTAL LINK FT
You can access the FollowMyHealth Patient Portal offered by [facility] by registering at the following website: [email or web address]. By joining [facility]’s FollowMyHealth portal, you will also be able to view your health information using other applications (apps) compatible with our system.

## 2021-07-28 NOTE — ED ADULT NURSE NOTE - NS ED NURSE LEVEL OF CONSCIOUSNESS AFFECT
Chart reviewed. Refill sent per protocol.    Medication(s) Requested: Folic acid  Last office visit: 6/16/21  Next office visit: 12/17/21  Labs:       ---- OV 6/16/21 Dr Pérez ----  Continue hydroxychloroquine and methotrexate.      
Appropriate

## 2021-08-12 ENCOUNTER — INPATIENT (INPATIENT)
Facility: HOSPITAL | Age: 56
LOS: 0 days | Discharge: ANOTHER HOSPITAL | DRG: 136 | End: 2021-08-13
Attending: INTERNAL MEDICINE | Admitting: FAMILY MEDICINE
Payer: MEDICAID

## 2021-08-12 VITALS
HEART RATE: 144 BPM | RESPIRATION RATE: 40 BRPM | OXYGEN SATURATION: 96 % | DIASTOLIC BLOOD PRESSURE: 122 MMHG | WEIGHT: 164.91 LBS | SYSTOLIC BLOOD PRESSURE: 158 MMHG | HEIGHT: 72 IN

## 2021-08-12 DIAGNOSIS — J96.01 ACUTE RESPIRATORY FAILURE WITH HYPOXIA: ICD-10-CM

## 2021-08-12 LAB
APPEARANCE UR: CLEAR — SIGNIFICANT CHANGE UP
BASOPHILS # BLD AUTO: 0.06 K/UL — SIGNIFICANT CHANGE UP (ref 0–0.2)
BASOPHILS NFR BLD AUTO: 0.2 % — SIGNIFICANT CHANGE UP (ref 0–2)
BILIRUB UR-MCNC: NEGATIVE — SIGNIFICANT CHANGE UP
COLOR SPEC: YELLOW — SIGNIFICANT CHANGE UP
DIFF PNL FLD: ABNORMAL
EOSINOPHIL # BLD AUTO: 0.01 K/UL — SIGNIFICANT CHANGE UP (ref 0–0.5)
EOSINOPHIL NFR BLD AUTO: 0 % — SIGNIFICANT CHANGE UP (ref 0–6)
GLUCOSE UR QL: NEGATIVE — SIGNIFICANT CHANGE UP
HCT VFR BLD CALC: 46.2 % — SIGNIFICANT CHANGE UP (ref 39–50)
HGB BLD-MCNC: 14.8 G/DL — SIGNIFICANT CHANGE UP (ref 13–17)
IMM GRANULOCYTES NFR BLD AUTO: 1.3 % — SIGNIFICANT CHANGE UP (ref 0–1.5)
KETONES UR-MCNC: NEGATIVE — SIGNIFICANT CHANGE UP
LACTATE SERPL-SCNC: 2 MMOL/L — SIGNIFICANT CHANGE UP (ref 0.7–2)
LEUKOCYTE ESTERASE UR-ACNC: NEGATIVE — SIGNIFICANT CHANGE UP
LYMPHOCYTES # BLD AUTO: 0.72 K/UL — LOW (ref 1–3.3)
LYMPHOCYTES # BLD AUTO: 2.9 % — LOW (ref 13–44)
MCHC RBC-ENTMCNC: 29.4 PG — SIGNIFICANT CHANGE UP (ref 27–34)
MCHC RBC-ENTMCNC: 32 GM/DL — SIGNIFICANT CHANGE UP (ref 32–36)
MCV RBC AUTO: 91.7 FL — SIGNIFICANT CHANGE UP (ref 80–100)
MONOCYTES # BLD AUTO: 1.58 K/UL — HIGH (ref 0–0.9)
MONOCYTES NFR BLD AUTO: 6.4 % — SIGNIFICANT CHANGE UP (ref 2–14)
NEUTROPHILS # BLD AUTO: 21.88 K/UL — HIGH (ref 1.8–7.4)
NEUTROPHILS NFR BLD AUTO: 89.2 % — HIGH (ref 43–77)
NITRITE UR-MCNC: NEGATIVE — SIGNIFICANT CHANGE UP
PH UR: 6 — SIGNIFICANT CHANGE UP (ref 5–8)
PLATELET # BLD AUTO: 477 K/UL — HIGH (ref 150–400)
PROT UR-MCNC: NEGATIVE — SIGNIFICANT CHANGE UP
RAPID RVP RESULT: SIGNIFICANT CHANGE UP
RBC # BLD: 5.04 M/UL — SIGNIFICANT CHANGE UP (ref 4.2–5.8)
RBC # FLD: 16.6 % — HIGH (ref 10.3–14.5)
SARS-COV-2 RNA SPEC QL NAA+PROBE: SIGNIFICANT CHANGE UP
SP GR SPEC: 1.01 — SIGNIFICANT CHANGE UP (ref 1.01–1.02)
UROBILINOGEN FLD QL: NEGATIVE — SIGNIFICANT CHANGE UP
WBC # BLD: 24.56 K/UL — HIGH (ref 3.8–10.5)
WBC # FLD AUTO: 24.56 K/UL — HIGH (ref 3.8–10.5)

## 2021-08-12 PROCEDURE — 71275 CT ANGIOGRAPHY CHEST: CPT

## 2021-08-12 PROCEDURE — 83735 ASSAY OF MAGNESIUM: CPT

## 2021-08-12 PROCEDURE — 86769 SARS-COV-2 COVID-19 ANTIBODY: CPT

## 2021-08-12 PROCEDURE — 93306 TTE W/DOPPLER COMPLETE: CPT

## 2021-08-12 PROCEDURE — 99223 1ST HOSP IP/OBS HIGH 75: CPT | Mod: GC

## 2021-08-12 PROCEDURE — 99222 1ST HOSP IP/OBS MODERATE 55: CPT

## 2021-08-12 PROCEDURE — 81001 URINALYSIS AUTO W/SCOPE: CPT

## 2021-08-12 PROCEDURE — 71045 X-RAY EXAM CHEST 1 VIEW: CPT | Mod: 26

## 2021-08-12 PROCEDURE — 94660 CPAP INITIATION&MGMT: CPT

## 2021-08-12 PROCEDURE — 94640 AIRWAY INHALATION TREATMENT: CPT

## 2021-08-12 PROCEDURE — 80053 COMPREHEN METABOLIC PANEL: CPT

## 2021-08-12 PROCEDURE — 96374 THER/PROPH/DIAG INJ IV PUSH: CPT

## 2021-08-12 PROCEDURE — 96375 TX/PRO/DX INJ NEW DRUG ADDON: CPT

## 2021-08-12 PROCEDURE — 84100 ASSAY OF PHOSPHORUS: CPT

## 2021-08-12 PROCEDURE — 84484 ASSAY OF TROPONIN QUANT: CPT

## 2021-08-12 PROCEDURE — 87086 URINE CULTURE/COLONY COUNT: CPT

## 2021-08-12 PROCEDURE — 85025 COMPLETE CBC W/AUTO DIFF WBC: CPT

## 2021-08-12 PROCEDURE — 99291 CRITICAL CARE FIRST HOUR: CPT

## 2021-08-12 PROCEDURE — 99291 CRITICAL CARE FIRST HOUR: CPT | Mod: 25

## 2021-08-12 PROCEDURE — 36415 COLL VENOUS BLD VENIPUNCTURE: CPT

## 2021-08-12 RX ORDER — SODIUM CHLORIDE 9 MG/ML
1000 INJECTION INTRAMUSCULAR; INTRAVENOUS; SUBCUTANEOUS ONCE
Refills: 0 | Status: COMPLETED | OUTPATIENT
Start: 2021-08-12 | End: 2021-08-12

## 2021-08-12 RX ORDER — BUDESONIDE AND FORMOTEROL FUMARATE DIHYDRATE 160; 4.5 UG/1; UG/1
2 AEROSOL RESPIRATORY (INHALATION)
Refills: 0 | Status: DISCONTINUED | OUTPATIENT
Start: 2021-08-12 | End: 2021-08-13

## 2021-08-12 RX ORDER — IPRATROPIUM/ALBUTEROL SULFATE 18-103MCG
3 AEROSOL WITH ADAPTER (GRAM) INHALATION EVERY 6 HOURS
Refills: 0 | Status: DISCONTINUED | OUTPATIENT
Start: 2021-08-12 | End: 2021-08-12

## 2021-08-12 RX ORDER — DILTIAZEM HCL 120 MG
5 CAPSULE, EXT RELEASE 24 HR ORAL
Qty: 125 | Refills: 0 | Status: DISCONTINUED | OUTPATIENT
Start: 2021-08-12 | End: 2021-08-12

## 2021-08-12 RX ORDER — CEFEPIME 1 G/1
INJECTION, POWDER, FOR SOLUTION INTRAMUSCULAR; INTRAVENOUS
Refills: 0 | Status: DISCONTINUED | OUTPATIENT
Start: 2021-08-12 | End: 2021-08-12

## 2021-08-12 RX ORDER — OXYCODONE HYDROCHLORIDE 5 MG/1
5 TABLET ORAL EVERY 6 HOURS
Refills: 0 | Status: DISCONTINUED | OUTPATIENT
Start: 2021-08-12 | End: 2021-08-13

## 2021-08-12 RX ORDER — VANCOMYCIN HCL 1 G
1000 VIAL (EA) INTRAVENOUS ONCE
Refills: 0 | Status: COMPLETED | OUTPATIENT
Start: 2021-08-12 | End: 2021-08-12

## 2021-08-12 RX ORDER — PIPERACILLIN AND TAZOBACTAM 4; .5 G/20ML; G/20ML
3.38 INJECTION, POWDER, LYOPHILIZED, FOR SOLUTION INTRAVENOUS ONCE
Refills: 0 | Status: COMPLETED | OUTPATIENT
Start: 2021-08-12 | End: 2021-08-12

## 2021-08-12 RX ORDER — ALBUTEROL 90 UG/1
1 AEROSOL, METERED ORAL EVERY 4 HOURS
Refills: 0 | Status: DISCONTINUED | OUTPATIENT
Start: 2021-08-12 | End: 2021-08-13

## 2021-08-12 RX ORDER — CEFEPIME 1 G/1
1000 INJECTION, POWDER, FOR SOLUTION INTRAMUSCULAR; INTRAVENOUS ONCE
Refills: 0 | Status: COMPLETED | OUTPATIENT
Start: 2021-08-12 | End: 2021-08-12

## 2021-08-12 RX ORDER — ONDANSETRON 8 MG/1
4 TABLET, FILM COATED ORAL EVERY 8 HOURS
Refills: 0 | Status: DISCONTINUED | OUTPATIENT
Start: 2021-08-12 | End: 2021-08-13

## 2021-08-12 RX ORDER — ACETAMINOPHEN 500 MG
650 TABLET ORAL EVERY 6 HOURS
Refills: 0 | Status: DISCONTINUED | OUTPATIENT
Start: 2021-08-12 | End: 2021-08-13

## 2021-08-12 RX ORDER — IPRATROPIUM/ALBUTEROL SULFATE 18-103MCG
3 AEROSOL WITH ADAPTER (GRAM) INHALATION EVERY 6 HOURS
Refills: 0 | Status: COMPLETED | OUTPATIENT
Start: 2021-08-12 | End: 2021-08-13

## 2021-08-12 RX ORDER — TIOTROPIUM BROMIDE 18 UG/1
1 CAPSULE ORAL; RESPIRATORY (INHALATION) DAILY
Refills: 0 | Status: DISCONTINUED | OUTPATIENT
Start: 2021-08-12 | End: 2021-08-13

## 2021-08-12 RX ORDER — OXYCODONE HYDROCHLORIDE 5 MG/1
10 TABLET ORAL EVERY 6 HOURS
Refills: 0 | Status: DISCONTINUED | OUTPATIENT
Start: 2021-08-12 | End: 2021-08-13

## 2021-08-12 RX ORDER — DILTIAZEM HCL 120 MG
60 CAPSULE, EXT RELEASE 24 HR ORAL EVERY 8 HOURS
Refills: 0 | Status: DISCONTINUED | OUTPATIENT
Start: 2021-08-12 | End: 2021-08-13

## 2021-08-12 RX ORDER — IPRATROPIUM/ALBUTEROL SULFATE 18-103MCG
3 AEROSOL WITH ADAPTER (GRAM) INHALATION
Refills: 0 | Status: COMPLETED | OUTPATIENT
Start: 2021-08-12 | End: 2021-08-12

## 2021-08-12 RX ORDER — CEFEPIME 1 G/1
1000 INJECTION, POWDER, FOR SOLUTION INTRAMUSCULAR; INTRAVENOUS EVERY 12 HOURS
Refills: 0 | Status: DISCONTINUED | OUTPATIENT
Start: 2021-08-13 | End: 2021-08-13

## 2021-08-12 RX ORDER — CEFEPIME 1 G/1
INJECTION, POWDER, FOR SOLUTION INTRAMUSCULAR; INTRAVENOUS
Refills: 0 | Status: DISCONTINUED | OUTPATIENT
Start: 2021-08-12 | End: 2021-08-13

## 2021-08-12 RX ADMIN — CEFEPIME 1000 MILLIGRAM(S): 1 INJECTION, POWDER, FOR SOLUTION INTRAMUSCULAR; INTRAVENOUS at 23:22

## 2021-08-12 RX ADMIN — Medication 250 MILLIGRAM(S): at 15:25

## 2021-08-12 RX ADMIN — Medication 3 MILLILITER(S): at 15:25

## 2021-08-12 RX ADMIN — Medication 3 MILLILITER(S): at 16:00

## 2021-08-12 RX ADMIN — Medication 125 MILLIGRAM(S): at 14:57

## 2021-08-12 RX ADMIN — SODIUM CHLORIDE 1000 MILLILITER(S): 9 INJECTION INTRAMUSCULAR; INTRAVENOUS; SUBCUTANEOUS at 14:58

## 2021-08-12 RX ADMIN — PIPERACILLIN AND TAZOBACTAM 200 GRAM(S): 4; .5 INJECTION, POWDER, LYOPHILIZED, FOR SOLUTION INTRAVENOUS at 14:59

## 2021-08-12 RX ADMIN — Medication 3 MILLILITER(S): at 14:58

## 2021-08-12 RX ADMIN — Medication 5 MG/HR: at 15:02

## 2021-08-12 NOTE — H&P ADULT - NSHPPHYSICALEXAM_GEN_ALL_CORE
Vitals  T(F): 97.6 (08-12-21 @ 18:30), Max: 97.7 (08-12-21 @ 15:15)  HR: 92 (08-12-21 @ 18:30) (85 - 144)  BP: 119/82 (08-12-21 @ 18:30) (113/85 - 158/122)  RR: 16 (08-12-21 @ 18:30) (13 - 40)  SpO2: 100% (08-12-21 @ 18:30) (96% - 100%)    Physical Exam   Gen: NAD, comfortable, in a BiPAP   HENT: atraumatic head and ears, no gross abnormalities of ears, mucous membranes moist, no oral lesions, neck supple without masses/goiter/lymphadenopathy  CV: irregular, tachycardia, nl s1/s2, no M/R/G  Pulm: nl respiratory effort, bilateral rhonchi and expiratory wheezings  Back: no scoliosis, lordosis, or kyphosis, no tenderness. Right side incision healed with granuloma tissue and mild erythema around   Abd: normoactive bowel sounds in all 4 quadrants, soft, nontender, nondistended, no rebound, no guarding, no masses  Extremities: no pedal edema, pedal pulses palpable   Skin: nl warm and dry, no wounds   Neuro: A&Ox3, answering questions appropriately, PERRL, EOMI, face symmetric, sensation equal bilaterally in face, tongue midline, no dysarthria, 5/5 strength in upper and lower extremities bilaterally, sensation intact in upper and lower extremities bilaterally, nl finger to nose, and nl heel to shin   Pysch: + depression, no SI, no HI Vitals  T(F): 97.6 (08-12-21 @ 18:30), Max: 97.7 (08-12-21 @ 15:15)  HR: 92 (08-12-21 @ 18:30) (85 - 144)  BP: 119/82 (08-12-21 @ 18:30) (113/85 - 158/122)  RR: 16 (08-12-21 @ 18:30) (13 - 40)  SpO2: 100% (08-12-21 @ 18:30) (96% - 100%)    Physical Exam   Gen: NAD, comfortable, in a BiPAP   HENT: atraumatic head and ears, no gross abnormalities of ears, mucous membranes moist, no oral lesions, neck supple without masses/goiter/lymphadenopathy  CV: irregular, tachycardia, nl s1/s2, no M/R/G  Pulm: in respiratory distress, tachypneic, bilateral rhonchi and expiratory wheezings  Back: no scoliosis, lordosis, or kyphosis, no tenderness. Right side incision healed with granuloma tissue and mild erythema around   Abd: normoactive bowel sounds in all 4 quadrants, soft, nontender, nondistended, no rebound, no guarding, no masses  Extremities: no pedal edema, pedal pulses palpable   Skin: nl warm and dry, no wounds   Neuro: A&Ox3, answering questions appropriately, PERRL, EOMI, face symmetric, sensation equal bilaterally in face, tongue midline, no dysarthria, 5/5 strength in upper and lower extremities bilaterally, sensation intact in upper and lower extremities bilaterally, nl finger to nose, and nl heel to shin   Pysch: + depression, no SI, no HI

## 2021-08-12 NOTE — ED ADULT NURSE NOTE - NS ED NOTE  TALK SOMEONE YN
Patient Education     Diarrhea with Uncertain Cause (Adult)    Diarrhea is when stools are loose and watery. This can be caused by:  · Viral infections  · Bacterial infections  · Food poisoning  · Parasites  · Irritable bowel syndrome (IBS)  · Inflammatory bowel diseases such as ulcerative colitis, Crohn's disease, and celiac disease  · Food intolerance, such as to lactose, the sugar found in milk and milk products  · Reaction to medicines like antibiotics, laxatives, cancer drugs, and antacids  Along with diarrhea, you may also have:  · Abdominal pain and cramping  · Nausea and vomiting  · Loss of bowel control  · Fever and chills  · Bloody stools  In some cases, antibiotics may help to treat diarrhea. You may have a stool sample test. This is done to see what is causing your diarrhea, and if antibiotics will help treat it. The results of a stool sample test may take up to 2 days. The healthcare provider may not give you antibiotics until he or she has the stool test results.  Diarrhea can cause dehydration. This is the loss of too much water and other fluids from the body. When this occurs, body fluid must be replaced. This can be done with oral rehydration solutions. Oral rehydration solutions are available at drugstores and grocery stores without a prescription.  Home care  Follow all instructions given by your healthcare provider. Rest at home for the next 24 hours, or until you feel better. Avoid caffeine, tobacco, and alcohol. These can make diarrhea, cramping, and pain worse.  If taking medicines:  · Don’t take over-the-counter diarrhea or nausea medicines unless your healthcare provider tells you to.  · You may use acetaminophen or NSAID medicines like ibuprofen or naproxen to reduce pain and fever. Don’t use these if you have chronic liver or kidney disease, or ever had a stomach ulcer or gastrointestinal bleeding. Don't use NSAID medicines if you are already taking one for another condition (like  arthritis) or are on daily aspirin therapy (such as for heart disease or after a stroke). Talk with your healthcare provider first.  · If antibiotics were prescribed, be sure you take them until they are finished. Don’t stop taking them even when you feel better. Antibiotics must be taken as a full course.  To prevent the spread of illness:  · Remember that washing with soap and water and using alcohol-based  is the best way to prevent the spread of infection.  · Clean the toilet after each use.  · Wash your hands before eating.  · Wash your hands before and after preparing food. Keep in mind that people with diarrhea or vomiting should not prepare food for others.  · Wash your hands after using cutting boards, countertops, and knives that have been in contact with raw foods.  · Wash and then peel fruits and vegetables.  · Keep uncooked meats away from cooked and ready-to-eat foods.  · Use a food thermometer when cooking. Cook poultry to at least 165°F (74°C). Cook ground meat (beef, veal, pork, lamb) to at least 160°F (71°C). Cook fresh beef, veal, lamb, and pork to at least 145°F (63°C).  · Don’t eat raw or undercooked eggs (poached or jenniffer side up), poultry, meat, or unpasteurized milk and juices.  Food and drinks  The main goal while treating vomiting or diarrhea is to prevent dehydration. This is done by taking small amounts of liquids often.  · Keep in mind that liquids are more important than food right now.  · Drink only small amounts of liquids at a time.  · Don’t force yourself to eat, especially if you are having cramping, vomiting, or diarrhea. Don’t eat large amounts at a time, even if you are hungry.  · If you eat, avoid fatty, greasy, spicy, or fried foods.  · Don’t eat dairy foods or drink milk if you have diarrhea. These can make diarrhea worse.  During the first 24 hours you can try:  · Oral rehydration solutions. Do not use sports drinks. They have too much sugar and not enough  electrolytes.  · Soft drinks without caffeine  · Ginger ale  · Water (plain or flavored)  · Decaf tea or coffee  · Clear broth, consommé, or bouillon  · Gelatin, popsicles, or frozen fruit juice bars  The second 24 hours, if you are feeling better, you can add:  · Hot cereal, plain toast, bread, rolls, or crackers  · Plain noodles, rice, mashed potatoes, chicken noodle soup, or rice soup  · Unsweetened canned fruit (no pineapple)  · Bananas  As you recover:  · Limit fat intake to less than 15 grams per day. Don’t eat margarine, butter, oils, mayonnaise, sauces, gravies, fried foods, peanut butter, meat, poultry, or fish.  · Limit fiber. Don’t eat raw or cooked vegetables, fresh fruits except bananas, or bran cereals.  · Limit caffeine and chocolate.  · Limit dairy.  · Don’t use spices or seasonings except salt.  · Go back to your normal diet over time, as you feel better and your symptoms improve.  · If the symptoms come back, go back to a simple diet or clear liquids.  Follow-up care  Follow up with your healthcare provider, or as advised. If a stool sample was taken or cultures were done, call the healthcare provider for the results as instructed.  Call 911  Call 911 if you have any of these symptoms:  · Trouble breathing  · Confusion  · Extreme drowsiness or trouble walking  · Loss of consciousness  · Rapid heart rate  · Chest pain  · Stiff neck  · Seizure  When to seek medical advice  Call your healthcare provider right away if any of these occur:  · Abdominal pain that gets worse  · Constant lower right abdominal pain  · Continued vomiting and inability to keep liquids down  · Diarrhea more than 5 times a day  · Blood in vomit or stool  · Dark urine or no urine for 8 hours, dry mouth and tongue, tiredness, weakness, or dizziness  · Drowsiness  · New rash  · You don’t get better in 2 to 3 days  · Fever of 100.4°F (38°C) or higher, or as directed by your healthcare provider  Date Last Reviewed: 1/3/2016  ©  1467-1866 [employer]. [address] [place] [address], [place] [address]. All rights reserved. This information is not intended as a substitute for professional medical care. Always follow your healthcare professional's instructions.            No

## 2021-08-12 NOTE — ED ADULT NURSE NOTE - OBJECTIVE STATEMENT
Patient comes to ED for respiratory distress from [place]. patient was seen by EMS to have HR in 200's, Cardizem 15 given HR in 140's. patient has a hx of lung ca with mets to bone. Patient Full code. Patient on EMS arrival, labored breathed O2 sat in 90's, placed on nonrebreather.

## 2021-08-12 NOTE — H&P ADULT - NSHPREVIEWOFSYSTEMS_GEN_ALL_CORE
Review of Symptoms  Gen:  + weight loss, fatigue. no fevers, chills, sweats  Visual: no recent changes in vision, no blurriness, no seeing spots  Cardiovascular: +chest pain,  palpitations. No orthopnea, no leg swelling  Respiratory: +shortness of breath,  exertional dyspnea,  cough, hemoptysis. no rhinorrhea, no nasal congestion  GI: no difficulty swallowing, no nausea, no vomiting, no abdominal pain, no diarrhea, no constipation, no melana  : no dysuria, no increased freq, no hematuria, no malodorous urine  Derm: no wounds, no rashes  Heme: no easy bleeding or bruising  MSK: no joint pain, no joint swelling or redness, no extremity pain   Neuro: no headache, no numbness, no weakness, no memory loss  Psych:+ depression, no anxiety, no SI

## 2021-08-12 NOTE — ED ADULT TRIAGE NOTE - CHIEF COMPLAINT QUOTE
Patient comes to ED for respiratory distress from [place]. patient was seen by EMS to have HR in 200's, Cardizem 15 given HR in 140's. patient has a hx of lung ca with mets to bone. Patient Full code. Patient on EMS arrival, labored breathed O2 sat in 90's, placed on nonrebreather.

## 2021-08-12 NOTE — H&P ADULT - NSICDXFAMILYHX_GEN_ALL_CORE_FT
FAMILY HISTORY:  FH: lung cancer, father    Mother  Still living? Unknown  FH: diabetes mellitus, Age at diagnosis: Age Unknown

## 2021-08-12 NOTE — H&P ADULT - NSICDXPASTMEDICALHX_GEN_ALL_CORE_FT
PAST MEDICAL HISTORY:  Bipolar disorder     Depression     Lung cancer     Polysubstance abuse     Schizophrenia

## 2021-08-12 NOTE — H&P ADULT - ASSESSMENT
57 y/o M homeless with a PMH of smoker ( quit one month ago), polysubstance abuse ( cocaine , marihuana), COPD, recently diagnosed with lung cancer came to the ED BIBEMs from [place] due tor respiratory distress.    #Acute hypoxemic respiratory failure in the setting of lung cancer  - suspected concomitant COPD exacerbation, r/o obstructive PNA and PE   - pO2 at [place] facility 90%  - At arrival patient was in a NRB and then transitioned to BiPAP   - ABG: pH 7.32, CO2 -49, HCO3 25, sat 100% in FIO2 30%   - s/p Solumedrol 125 mg IV once and Duoneb tx   - Chest X ray showed 2cm right lower lung field mass , slightly bibasilar effusions   - D dimer 294   - Check CTA chest to r/o PE and post obstructive PNA  due to current lung cancer and hemoptysis, productive cough and hypoxemia   - Start Cefepime 1g every 12 hrs   - Star doxycyline 100 mg PO BID ]  - Albuterol PRN   - Start Ipratropium and Symbicort inhaler   - Start Solumedrol 40 mg IV every 8 hrs and   - Continue oxygen supplementation and titrate down as tolerated   - Monitor vital signs     #New onset Afib   - Pt reports on/off palpitations since was diagnosed with lung cancer   - HR at [place]- 200   - EKG at admission showed Afib RVR   - CHADSVASC score 0- no AC at this moment but follow up with cardiology for further recommendations   - s/p Cardizem ggt with conversion at sinus rhythm   - Start Cardizem 60 mg PO every 8 hrs  - Check ECHO   - Cardiology consulted     #Lung cancer/ R back chest wall mass   - Dx on 7/7/21 after and admission in [facility] after an suicide attempt   - CT chest performed on  7/7/21 showed large segment R middle lobe speculated  nodule measuring 3.1 s2.3 cm abutting the major fissure  indicating primary lung cancer, right posterior chest wall metastasis  and extensive mediastinal and hilar adenopathy.  - s/p R chest wall mass biopsy on 7/8/21 that showed  poorly differentiated carcinoma   - Pt evaluated by Dr Amaya and after discussion with hemato-oncology  decided to don't proceed with EBUS/ bronch with biopsy   - Pt received one radiotherapy  - Hemato- oncology consulted for further recommendations     #Leukocytosis   - WBC 24.56   - Could be secondary to recent use of steroid and concomitant PNA   - Check CT chest   - Start Cefepime and Doxycycline   - f/u BCx and UCx   - Trend CBC     # Smoker/ polysubstance abuse/ homeless   - Pt reports quit smoking one month ago   - Last time took cocaine was 6 months ago. Still using marihuana   -  consult     #Advance directives   - Full code    #DVT ppx   - Lovenox 40     [place] discussed with Dr Corey       55 y/o M homeless with a PMH of smoker ( quit one month ago), polysubstance abuse ( cocaine , marihuana), COPD, recently diagnosed with lung cancer came to the ED BIBEMs from [place] due tor respiratory distress.    #Acute hypoxemic respiratory failure in the setting of lung cancer  - suspected concomitant COPD exacerbation, r/o obstructive PNA and PE   - pO2 at [place] facility 90%  - At arrival patient was in a NRB and then transitioned to BiPAP   - ABG: pH 7.32, CO2 -49, HCO3 25, sat 100% in FIO2 30%   - s/p Solumedrol 125 mg IV once and Duoneb   - s/p Zosyn and Vancomycin in the ED   - Chest X ray showed 2cm right lower lung field mass , slightly bibasilar effusions   - D dimer 294   - Check CTA chest to r/o PE and post obstructive PNA  due to current lung cancer, hemoptysis, productive cough and hypoxemia   - Start Cefepime 1g every 12 hrs   - Consider to continues with Vancomycin; ID consulted for further recommendations   - Albuterol PRN   - Start Ipratropium and Symbicort inhaler   - Start Solumedrol 40 mg IV every 8 hrs   - Continue oxygen supplementation and titrate down as tolerated   - Monitor vital signs     #New onset Afib   - Pt reports on/off palpitations since was diagnosed with lung cancer   - HR at [place]- 200   - EKG at admission showed Afib RVR   - CHADSVASC score 0- no full AC at this moment but follow up with cardiology for further recommendations   - s/p Cardizem ggt with conversion at sinus rhythm   - Start Cardizem 60 mg PO every 8 hrs  - Check ECHO   - Cardiology consulted     #Lung cancer/ R back chest wall mass   - Dx on 7/7/21 after and admission in [facility] after an suicide attempt   - CT chest performed on  7/7/21 showed large segment R middle lobe speculated  nodule measuring 3.1 s2.3 cm abutting the major fissure  indicating primary lung cancer, right posterior chest wall metastasis  and extensive mediastinal and hilar adenopathy.  - s/p R chest wall mass biopsy on 7/8/21 that showed  poorly differentiated carcinoma   - Pt evaluated by Dr Amaya and after a  discussion with hemato-oncology  decided to don't proceed with EBUS/ bronch with biopsy   - Pt received one radiotherapy  - Hemato- oncology consulted for further recommendations     #Leukocytosis   - WBC 24.56   - Could be secondary to recent use of steroid and concomitant PNA   - Check CT chest   - Start Cefepime and Vancomycin   - Sputum culture   - f/u BCx and UCx   - Trend CBC     # Smoker/ polysubstance abuse/ homeless   - Pt reports quit smoking one month ago   - Last time took cocaine was 6 months ago. Still using marihuana   -  consult     #Advance directives   - Full code    #DVT ppx   - Lovenox 40 subcutaneus     Chapin discussed with Dr Corey       57 y/o M homeless with a PMH of smoker ( quit one month ago), polysubstance abuse ( cocaine , marihuana), COPD, recently diagnosed with lung cancer came to the ED BIBEMs from [place] due tor respiratory distress.    #Acute hypoxemic respiratory failure in the setting of lung cancer  - suspected concomitant COPD exacerbation, r/o obstructive PNA and PE   - pO2 at [place] facility 90%  - At arrival patient was in a NRB and then transitioned to BiPAP   - ABG: pH 7.32, CO2 -49, HCO3 25, sat 100% in FIO2 30%   - s/p Solumedrol 125 mg IV once and Duoneb   - s/p Zosyn and Vancomycin in the ED   - Chest X ray showed 2cm right lower lung field mass , slightly bibasilar effusions   - D dimer 294   - Check CTA chest to r/o PE and post obstructive PNA  due to current lung cancer, hemoptysis, productive cough and hypoxemia   - Start Cefepime 1g every 12 hrs   - Consider to continues with Vancomycin; ID consulted for further recommendations   - Albuterol PRN   - Start Ipratropium and Symbicort inhaler   - Start Solumedrol 40 mg IV every 8 hrs   - Continue oxygen supplementation and titrate down as tolerated   - Monitor vital signs     #New onset Afib   - Pt reports on/off palpitations since was diagnosed with lung cancer   - HR at [place]- 200   - EKG at admission showed Afib RVR   - CHADSVASC score 0- no full AC at this moment but follow up with cardiology for further recommendations   - s/p Cardizem ggt with conversion at sinus rhythm   - Start Cardizem 60 mg PO every 8 hrs  - Check ECHO   - Cardiology consulted     #Lung cancer/ R back chest wall mass   - Dx on 7/7/21 after and admission in [facility] after an suicide attempt   - CT chest performed on  7/7/21 showed large segment R middle lobe speculated  nodule measuring 3.1 s2.3 cm abutting the major fissure  indicating primary lung cancer, right posterior chest wall metastasis  and extensive mediastinal and hilar adenopathy.  - s/p R chest wall mass biopsy on 7/8/21 that showed  poorly differentiated carcinoma   - Pt evaluated by Dr Amaya and after a  discussion with hemato-oncology  decided to don't proceed with EBUS/ bronch with biopsy   - Pt received one radiotherapy  - Hemato- oncology consulted for further recommendations     #Leukocytosis   - WBC 24.56   - Could be secondary to recent use of steroid and concomitant PNA   - Check CT chest   - Start Cefepime and Vancomycin   - Sputum culture   - f/u BCx and UCx   - Trend CBC     # Smoker/ polysubstance abuse/ homeless   - Pt reports quit smoking one month ago   - Last time took cocaine was 6 months ago. Still using marihuana   -  consult     #Depression/ anxiety   - Recent suicidal attempt   - Pt denies any suicidal thinking at evaluation  - Reassess mental status often and if concerns of self harm consider behavioral evaluation and constant observation  - On xanax at home     #Advance directives   - Full code    #DVT ppx   - Lovenox 40 subcutaneus     Chapin discussed with Dr Corey

## 2021-08-12 NOTE — H&P ADULT - ATTENDING COMMENTS
57 y/o M homeless with a PMHx as noted above including a recent diagnosis with lung cancer presents to  for further evaluation and management of acute hypoxic respiratory failure.    #Acute hypoxemic respiratory failure in the setting of lung cancer  ~admit to SICU  ~as above patient's respiratory failure is likely multifactorial including COPD exacerbation, and post-obstructive PNA    ~cont. BiPAP  ~f/u w/ Pulmonology in the am  ~f/u w/ ID consultation in the am  ~ABG reviewed  ~cont. IV abx  ~cont. IV steroids  ~cont. Albuterol   ~f/u CTA Chest     #New onset Afib   ~CHADSVASC score 0  ~patient is s/p Cardizem ggt with conversion at sinus rhythm   ~f/u 2DECHO   ~f/u w/ Cardiology consulted     #Lung cancer/ R back chest wall mass   ~as above patient was diagnosed on 7/7/21 after an admission to [facility] after an apparent suicide attempt   ~patient is s/p right chest wall mass biopsy on 7/8/21 which revealed poorly differentiated carcinoma   ~f/u w/ Heme-Onc    # Smoker/ polysubstance abuse  ~cont. management as above     #Depression/ anxiety   ~patient with recent suicidal attempt   ~as above patient denies any suicidal thoughts     #Advance directives   ~Full code    #Vte ppx  ~cont. LMWH sq

## 2021-08-12 NOTE — ED PROVIDER NOTE - CARE PLAN
1 Principal Discharge DX:	Acute respiratory failure with hypoxia  Secondary Diagnosis:	Atrial fibrillation  Secondary Diagnosis:	COPD exacerbation

## 2021-08-12 NOTE — H&P ADULT - HISTORY OF PRESENT ILLNESS
57 y/o M homeless with a PMH of smoker ( quit one month ago), polysubstance abuse ( cocaine , marihuana), COPD, recently diagnosed with lung cancer came to the ED BIBEMs from [place] due tor respiratory distress. Pt reports was diagnosed with lung cancer after an admission on [facility] on 7/7/21 after an suicided attempt. He is s/p right back chest wall biopsy on 7/8/21 that showed poorly differentiated carcinoma and deferred EBUS/ bronch by CT surgery. Pt was recently discharge from [facility] to [place] on 8/6/21.Pt reports since was diagnosed with possible lung cancer is experiencing progressive SOB worse with exertion. SOB have got worse since was d/c from [facility] one week ago. This morning patient wake up with extreme SOB  and was gasping for air and  EMS called. He endorses associated sweating, chest pain and palpitation. Per patient he have been experiencing on/off  substernal chest pain and palpitation since was dx with lung cancer. He describes chest pain as pressure like, non radiating and exacerbated with coughing. He endorse cough since approximately one month ago of yellow sputum and tingle blood on it. He states has hemoptysis since 8/2/21  but have been getting better since then. Last episode of hemoptysis was one three days ago. He have loss approximately 10-20 pound since July 2021.He reports supposed to follow up with [facility] but have not seen  him yet. He has been receiving radiation being the second one today but missed the appointment due was brought to the hospital. He denies fever, chills, nausea , vomits, leg swelling, orthopnea.     At arrival to ED patient found with /122, hr 144,RR 40 , 96 % pO2 in a NRB. By EMS patietn HR was 200  and pO2 90 and labored in [place].  57 y/o M homeless with a PMH of smoker ( quit one month ago), polysubstance abuse ( cocaine , marihuana), COPD, recently diagnosed with lung cancer came to the ED BIBEMs from [place] due tor respiratory distress. Pt reports was diagnosed with lung cancer after an admission on [facility] on 7/7/21 after an suicided attempt. He is s/p right back chest wall biopsy on 7/8/21 that showed poorly differentiated carcinoma and deferred EBUS/ bronch by CT surgery. Pt was recently discharge from [facility] to [place] on 8/6/21.Pt reports since was diagnosed with possible lung cancer is experiencing progressive SOB worse with exertion. SOB have got worse since was d/c from [facility] one week ago. This morning patient wake up with extreme SOB  and was gasping for air and  EMS called. He endorses associated sweating, chest pain and palpitation. Per patient he have been experiencing on/off  substernal chest pain and palpitation since was dx with lung cancer. He describes chest pain as pressure like, non radiating and exacerbated with coughing. He endorse cough since approximately one month ago of yellow sputum and tingle blood on it. He states has hemoptysis since 8/2/21  but have been getting better since then. Last episode of hemoptysis was one three days ago. He have loss approximately 10-20 pound since July 2021.He reports supposed to follow up with [facility] but have not seen  him yet. He has been receiving radiation being the second one today but missed the appointment due was brought to the hospital. He denies fever, chills, nausea , vomits, leg swelling, orthopnea.     At arrival to ED patient found with /122, hr 144,RR 40 , 96 % pO2 in a NRB. By EMS patient HR was 200  and pO2 90 and labored in [place]. At arrival patient placed in NRB with pO2 96% and then transitioned to BiPAP. Chest X ray performed and showed 2cm right lower lung field mass and slightly bibasilar effusion. EKH showed Afib with RVR with premature ventricular complexes. Labs remarkable for WBC 24.56, Hgb 14.8 , CR 1.19, , D dimer 295, trop neg x 2. Pt was started in a Cardizem drip with conversion of NSR. At ED patient received  Zosyn x once, Vancomycin x once, Duoneb , solumedrol. ICU evaluated patient and recommended to continue with BiPAP and no ICU care needed at this moment.  57 y/o M homeless with a PMH of smoker ( quit one month ago), polysubstance abuse ( cocaine , marihuana), COPD, depression recently diagnosed with lung cancer came to the ED BIBEMs from [place] due to respiratory distress. Pt reports was diagnosed with lung cancer after an admission on [facility] on 7/7/21 due to suicide attempt. He is s/p right back chest wall mass biopsy on 7/8/21 that showed poorly differentiated carcinoma andEBUS/ bronch by CT surgery was deferred. Pt was recently discharged from [facility] to [place] on 8/6/21.Pt reports since was diagnosed with lung cancer is experiencing progressive SOB. SOB got worse since was d/c from [facility] one week ago. This morning patient wake up with extreme SOB, was gasping for air and EMS called. He endorses associated sweating, chest pain and palpitations during the event. Pt have been experiencing on/off  substernal chest pain and palpitations since was dx with lung cancer. He describes chest pain as pressure like, non radiating and exacerbated with coughing. He endorse cough since approximately one month ago of yellow sputum and tingle blood on it. He states has hemoptysis since 8/2/21  but have been getting better since then. Last episode of hemoptysis was three days ago. He have loss approximately 10-20 pounds since July, 2021.He reports supposed to follow up with hemato-oncology but have not seen  him yet. He has been receiving radiation being the second dose today but missed the appointment due was brought to the hospital. He denies fever, chills, nausea , vomits, leg swelling, orthopnea.     At arrival to ED patient found with /122, ,RR 40 , 96 % pO2 in a NRB. By EMS patient HR was 200  and pO2 90 and labored at ex. At arrival patient placed in NRB with pO2 96% and then transitioned to BiPAP. Chest X ray performed and showed 2cm right lower lung field mass and slightly bibasilar effusion. EKH showed Afib with RVR with premature ventricular complexes. Labs remarkable for WBC 24.56, Hgb 14.8 , CR 1.19, , D dimer 295, trop neg x 2. Pt was started in a Cardizem drip with conversion of NSR. At ED patient received  Zosyn x once, Vancomycin x once, Duoneb , solumedrol. ICU evaluated patient and recommended to continue with BiPAP and no ICU care needed at this moment.  55 y/o M homeless with a PMH of smoker ( quit one month ago), polysubstance abuse ( cocaine , marihuana), COPD, depression recently diagnosed with lung cancer came to the ED BIBEMs from [place] due to respiratory distress. Pt reports was diagnosed with lung cancer after an admission on [facility] on 7/7/21 due to suicide attempt. He is s/p right back chest wall mass biopsy on 7/8/21 that showed poorly differentiated carcinoma andEBUS/ bronch by CT surgery was deferred. Pt was recently discharged from [facility] to [place] on 8/6/21.Pt reports since was diagnosed with lung cancer is experiencing progressive SOB. SOB got worse since was d/c from [facility] one week ago. This morning patient wake up with extreme SOB, was gasping for air and EMS called. He endorses associated sweating, chest pain and palpitations during the event. Pt have been experiencing on/off  substernal chest pain and palpitations since was dx with lung cancer. He describes chest pain as pressure like, non radiating and exacerbated with coughing. He endorse cough since approximately one month ago of yellow sputum and tingle blood on it. He states has hemoptysis since 8/2/21  but have been getting better since then. Last episode of hemoptysis was three days ago. He have loss approximately 10-20 pounds since July, 2021.He reports supposed to follow up with hemato-oncology but have not seen  him yet. He has been receiving radiation being the second dose today but missed the appointment due was brought to the hospital. He denies fever, chills, nausea , vomits, leg swelling, orthopnea.     At arrival to ED patient found with /122, ,RR 40 , 96 % pO2 in a NRB. By EMS patient HR was 200  and pO2 90 and labored at ex. At arrival patient placed in NRB with pO2 96% and then transitioned to BiPAP. Chest X ray performed and showed 2cm right lower lung field mass and slightly bibasilar effusion. EKG showed Afib with RVR and premature ventricular complexes. Labs remarkable for WBC 24.56, Hgb 14.8 , Cr 1.19, , D dimer 295, trop neg x 2. Pt was started in a Cardizem drip with conversion of NSR. At ED patient received  Zosyn x once, Vancomycin x once, Duoneb , solumedrol. ICU evaluated patient and recommended to continue with BiPAP and no ICU care needed at this moment.

## 2021-08-12 NOTE — ED ADULT TRIAGE NOTE - NURSING HOMES
HPI:   Malcom Erickson is a 60year old female who presents for a medicare breast and pelvic exam. .     Wt Readings from Last 6 Encounters:  10/05/20 : 131 lb 4.8 oz (59.6 kg)  09/28/20 : 132 lb 4.8 oz (60 kg)  07/17/20 : 127 lb 9.6 oz (57.9 kg)  03/1 Oral Tab Take 1 tablet (25 mg total) by mouth 3 (three) times daily as needed for Dizziness. 20 tablet 0   • Melatonin 1 MG Oral Cap Take 3 mg by mouth daily.        • acetaminophen 500 MG Oral Tab Take 1,000 mg by mouth every 6 (six) hours as needed for Pa normal for age without lesions  Urethral meatus: normal size, non-prolapsed, no lesions  Vagina: atrophic mucosa,noninflammed, no discharge, no lesions, kegel normal, no cystocoele or rectocoele  Cervix present/, normal appearance without lesions  Uterus: [facility]

## 2021-08-12 NOTE — H&P ADULT - NSHPSOCIALHISTORY_GEN_ALL_CORE
Pt is homeless and currently was on [employer]. He reports a smoking hx of more than 40 year , 1 pack at day. Quit smoking one month ago after was dx with lung cancer. He reports occasional alcohol use. He  used cocaine in the past , being the last time 6 months ago. He currently use marihuana.

## 2021-08-12 NOTE — ED ADULT NURSE NOTE - CHIEF COMPLAINT QUOTE
Patient comes to ED for respiratory distress from [place]. patient was seen by EMS to have HR in 200's, Cardizem 15 given HR in 140's. patient has a hx of lung ca with mets to bone. Patient Full code. Patient on EMS arrival, labored breathed O2 sat in 90's, placed on nonrebreather.

## 2021-08-12 NOTE — CONSULT NOTE ADULT - SUBJECTIVE AND OBJECTIVE BOX
Patient is 55yo male with PMHx COPD, Schizophrenia, Bipolar disorder, polysubstance abuse, Lung Ca rita IV, as per documentation, presented from [place] for resp distress, and new onset Afib. Pt reports he has had worsening SOB, and cough. Upon arrival to the ER, patient in mild resp distress, placed on BIPAP 18/8/30%, given Solumedrol, IV ZOsyn/Vanco, MDI.     Currently the patient reports he feels significantly better since admission. ON bipap.   ABG 7.32/49/249/100%  Moving good air, minimal wheezing      PMHx as above  PSHx as above  Meds as per EMR  FHx NC  Social former smoker, denies etoh  ROS as above      T(C): 36.5 (08-12-21 @ 15:15), Max: 36.5 (08-12-21 @ 15:15)  HR: 124 (08-12-21 @ 15:15) (124 - 144)  BP: 145/93 (08-12-21 @ 15:15) (120/74 - 158/122)  RR: 17 (08-12-21 @ 15:15) (17 - 40)  SpO2: 100% (08-12-21 @ 15:15) (96% - 100%)  Wt(kg): --    Gen: AAOx3, NAD  HEENT: NCAT, EOMI  Neck: Supple  CV: nml S1S2, RRR  Lungs: minimal end exp wheezing, good air entry  Abd: Soft, NT, ND, BS+  Ext: No edema  Neuro: Non focal                          14.8   24.56 )-----------( 477      ( 12 Aug 2021 14:23 )             46.2             CAPILLARY BLOOD GLUCOSE          CXR  Again noted is a 2 cm right lower lung field mass. Presently there are slight basilar effusions.

## 2021-08-12 NOTE — CONSULT NOTE ADULT - ASSESSMENT
COPD exacerbation  SOB  Lung Ca with mets  New Onset Afib    - currently afebrile, HD stable, SBP 140s, HR 110s, AFIB  - On BIPAP, ABG with chronic resp acidosis  - Pt fairly comfortable, with good air movement, minimal wheezing  - CXR no focal infiltrate    RECOMMEND:  - wean off BIPAP, to 4LNC,  - Solumedrol 40mg IV TID  - MDI PRN  - Rocephin, Doxy  - check COVID19 PCR  - Cardizem PO 60mg QID  - Would AC, Heparin  - GI ppx  - DVT ppx  - monitor on step down.

## 2021-08-12 NOTE — ED PROVIDER NOTE - PROGRESS NOTE DETAILS
HEAD:  Atraumatic, Normocephalic  EYES: EOMI, PERRLA, conjunctiva and sclera clear  ENMT: No tonsillar erythema, exudates, or enlargement; Moist mucous membranes, Good dentition, No lesions  NECK: Supple, No JVD, Normal thyroid Tonie GELLER for Dr. Almendarez: pt improved after nebs, steroids, BiPAP. Will monitor closely in attempt to intubation, if pt shows any signs of AMS will proceed with intubation expeditionary, ICU consult Tonie Almendarez: seen by ICU, pt stable Tonie GELLER for Dr. Almendarez: seen by ICU, pt stable for admission Tonie GELLER for Dr. Almendarez: pt improved after nebs, steroids, BiPAP. Will monitor closely in attempt to intubation, if pt shows any signs of AMS will proceed with intubation, ICU consultpending Tonie GELLER for Dr. Almendarez: seen by ICU, pt stable for admission to CICU Marked improvement in resp status after nebs, bipap, steroids -- now ventilating well, SpO2 100% on FiO2 40. HR =108.  Eval by ICU and stable for CICU (and I concur) .

## 2021-08-13 ENCOUNTER — TRANSCRIPTION ENCOUNTER (OUTPATIENT)
Age: 56
End: 2021-08-13

## 2021-08-13 ENCOUNTER — INPATIENT (INPATIENT)
Facility: HOSPITAL | Age: 56
LOS: 13 days | Discharge: HOME | DRG: 207 | End: 2021-08-27
Attending: THORACIC SURGERY (CARDIOTHORACIC VASCULAR SURGERY) | Admitting: THORACIC SURGERY (CARDIOTHORACIC VASCULAR SURGERY)
Payer: COMMERCIAL

## 2021-08-13 VITALS
OXYGEN SATURATION: 96 % | WEIGHT: 149.91 LBS | DIASTOLIC BLOOD PRESSURE: 87 MMHG | RESPIRATION RATE: 20 BRPM | HEART RATE: 98 BPM | SYSTOLIC BLOOD PRESSURE: 131 MMHG | HEIGHT: 72 IN

## 2021-08-13 VITALS — TEMPERATURE: 97 F

## 2021-08-13 DIAGNOSIS — R91.8 OTHER NONSPECIFIC ABNORMAL FINDING OF LUNG FIELD: ICD-10-CM

## 2021-08-13 DIAGNOSIS — C34.90 MALIGNANT NEOPLASM OF UNSPECIFIED PART OF UNSPECIFIED BRONCHUS OR LUNG: ICD-10-CM

## 2021-08-13 LAB
-  COAGULASE NEGATIVE STAPHYLOCOCCUS, METHICILLIN RESISTANT: SIGNIFICANT CHANGE UP
A1C WITH ESTIMATED AVERAGE GLUCOSE RESULT: 5.8 % — HIGH (ref 4–5.6)
ALBUMIN SERPL ELPH-MCNC: 2.4 G/DL — LOW (ref 3.3–5)
ALBUMIN SERPL ELPH-MCNC: 3.3 G/DL — SIGNIFICANT CHANGE UP (ref 3.3–5.2)
ALP SERPL-CCNC: 112 U/L — SIGNIFICANT CHANGE UP (ref 40–120)
ALP SERPL-CCNC: 122 U/L — HIGH (ref 40–120)
ALT FLD-CCNC: 27 U/L — SIGNIFICANT CHANGE UP
ALT FLD-CCNC: 38 U/L — SIGNIFICANT CHANGE UP (ref 12–78)
ANION GAP SERPL CALC-SCNC: 14 MMOL/L — SIGNIFICANT CHANGE UP (ref 5–17)
ANION GAP SERPL CALC-SCNC: 3 MMOL/L — LOW (ref 5–17)
ANISOCYTOSIS BLD QL: SLIGHT — SIGNIFICANT CHANGE UP
APPEARANCE UR: CLEAR — SIGNIFICANT CHANGE UP
APTT BLD: 30.4 SEC — SIGNIFICANT CHANGE UP (ref 27.5–35.5)
AST SERPL-CCNC: 15 U/L — SIGNIFICANT CHANGE UP
AST SERPL-CCNC: 16 U/L — SIGNIFICANT CHANGE UP (ref 15–37)
BASOPHILS # BLD AUTO: 0 K/UL — SIGNIFICANT CHANGE UP (ref 0–0.2)
BASOPHILS # BLD AUTO: 0.03 K/UL — SIGNIFICANT CHANGE UP (ref 0–0.2)
BASOPHILS NFR BLD AUTO: 0 % — SIGNIFICANT CHANGE UP (ref 0–2)
BASOPHILS NFR BLD AUTO: 0.2 % — SIGNIFICANT CHANGE UP (ref 0–2)
BILIRUB SERPL-MCNC: 0.4 MG/DL — SIGNIFICANT CHANGE UP (ref 0.2–1.2)
BILIRUB SERPL-MCNC: 0.4 MG/DL — SIGNIFICANT CHANGE UP (ref 0.4–2)
BILIRUB UR-MCNC: NEGATIVE — SIGNIFICANT CHANGE UP
BLD GP AB SCN SERPL QL: SIGNIFICANT CHANGE UP
BUN SERPL-MCNC: 17.1 MG/DL — SIGNIFICANT CHANGE UP (ref 8–20)
BUN SERPL-MCNC: 18 MG/DL — SIGNIFICANT CHANGE UP (ref 7–23)
CALCIUM SERPL-MCNC: 10.1 MG/DL — SIGNIFICANT CHANGE UP (ref 8.6–10.2)
CALCIUM SERPL-MCNC: 9.5 MG/DL — SIGNIFICANT CHANGE UP (ref 8.5–10.1)
CHLORIDE SERPL-SCNC: 102 MMOL/L — SIGNIFICANT CHANGE UP (ref 96–108)
CHLORIDE SERPL-SCNC: 95 MMOL/L — LOW (ref 98–107)
CO2 SERPL-SCNC: 26 MMOL/L — SIGNIFICANT CHANGE UP (ref 22–29)
CO2 SERPL-SCNC: 31 MMOL/L — SIGNIFICANT CHANGE UP (ref 22–31)
COLOR SPEC: YELLOW — SIGNIFICANT CHANGE UP
COVID-19 SPIKE DOMAIN AB INTERP: NEGATIVE — SIGNIFICANT CHANGE UP
COVID-19 SPIKE DOMAIN ANTIBODY RESULT: 0.4 U/ML — SIGNIFICANT CHANGE UP
CREAT SERPL-MCNC: 0.59 MG/DL — SIGNIFICANT CHANGE UP (ref 0.5–1.3)
CREAT SERPL-MCNC: 0.64 MG/DL — SIGNIFICANT CHANGE UP (ref 0.5–1.3)
CULTURE RESULTS: NO GROWTH — SIGNIFICANT CHANGE UP
DIFF PNL FLD: ABNORMAL
EOSINOPHIL # BLD AUTO: 0 K/UL — SIGNIFICANT CHANGE UP (ref 0–0.5)
EOSINOPHIL # BLD AUTO: 0 K/UL — SIGNIFICANT CHANGE UP (ref 0–0.5)
EOSINOPHIL NFR BLD AUTO: 0 % — SIGNIFICANT CHANGE UP (ref 0–6)
EOSINOPHIL NFR BLD AUTO: 0 % — SIGNIFICANT CHANGE UP (ref 0–6)
ESTIMATED AVERAGE GLUCOSE: 120 MG/DL — HIGH (ref 68–114)
GAS PNL BLDA: SIGNIFICANT CHANGE UP
GIANT PLATELETS BLD QL SMEAR: PRESENT — SIGNIFICANT CHANGE UP
GLUCOSE SERPL-MCNC: 110 MG/DL — HIGH (ref 70–99)
GLUCOSE SERPL-MCNC: 118 MG/DL — HIGH (ref 70–99)
GLUCOSE UR QL: NEGATIVE MG/DL — SIGNIFICANT CHANGE UP
GRAM STN FLD: SIGNIFICANT CHANGE UP
HCT VFR BLD CALC: 38 % — LOW (ref 39–50)
HCT VFR BLD CALC: 39.3 % — SIGNIFICANT CHANGE UP (ref 39–50)
HGB BLD-MCNC: 12.3 G/DL — LOW (ref 13–17)
HGB BLD-MCNC: 13.2 G/DL — SIGNIFICANT CHANGE UP (ref 13–17)
IMM GRANULOCYTES NFR BLD AUTO: 0.8 % — SIGNIFICANT CHANGE UP (ref 0–1.5)
INR BLD: 1.36 RATIO — HIGH (ref 0.88–1.16)
KETONES UR-MCNC: NEGATIVE — SIGNIFICANT CHANGE UP
LEUKOCYTE ESTERASE UR-ACNC: NEGATIVE — SIGNIFICANT CHANGE UP
LYMPHOCYTES # BLD AUTO: 0.33 K/UL — LOW (ref 1–3.3)
LYMPHOCYTES # BLD AUTO: 0.52 K/UL — LOW (ref 1–3.3)
LYMPHOCYTES # BLD AUTO: 1.9 % — LOW (ref 13–44)
LYMPHOCYTES # BLD AUTO: 2.6 % — LOW (ref 13–44)
MAGNESIUM SERPL-MCNC: 2.3 MG/DL — SIGNIFICANT CHANGE UP (ref 1.6–2.6)
MANUAL SMEAR VERIFICATION: SIGNIFICANT CHANGE UP
MCHC RBC-ENTMCNC: 29.2 PG — SIGNIFICANT CHANGE UP (ref 27–34)
MCHC RBC-ENTMCNC: 29.3 PG — SIGNIFICANT CHANGE UP (ref 27–34)
MCHC RBC-ENTMCNC: 32.4 GM/DL — SIGNIFICANT CHANGE UP (ref 32–36)
MCHC RBC-ENTMCNC: 33.6 GM/DL — SIGNIFICANT CHANGE UP (ref 32–36)
MCV RBC AUTO: 87.3 FL — SIGNIFICANT CHANGE UP (ref 80–100)
MCV RBC AUTO: 90.3 FL — SIGNIFICANT CHANGE UP (ref 80–100)
METHOD TYPE: SIGNIFICANT CHANGE UP
MICROCYTES BLD QL: SLIGHT — SIGNIFICANT CHANGE UP
MONOCYTES # BLD AUTO: 0.36 K/UL — SIGNIFICANT CHANGE UP (ref 0–0.9)
MONOCYTES # BLD AUTO: 0.62 K/UL — SIGNIFICANT CHANGE UP (ref 0–0.9)
MONOCYTES NFR BLD AUTO: 1.8 % — LOW (ref 2–14)
MONOCYTES NFR BLD AUTO: 3.6 % — SIGNIFICANT CHANGE UP (ref 2–14)
NEUTROPHILS # BLD AUTO: 15.92 K/UL — HIGH (ref 1.8–7.4)
NEUTROPHILS # BLD AUTO: 19.3 K/UL — HIGH (ref 1.8–7.4)
NEUTROPHILS NFR BLD AUTO: 93.5 % — HIGH (ref 43–77)
NEUTROPHILS NFR BLD AUTO: 95.6 % — HIGH (ref 43–77)
NITRITE UR-MCNC: NEGATIVE — SIGNIFICANT CHANGE UP
NT-PROBNP SERPL-SCNC: 264 PG/ML — SIGNIFICANT CHANGE UP (ref 0–300)
PH UR: 6.5 — SIGNIFICANT CHANGE UP (ref 5–8)
PHOSPHATE SERPL-MCNC: 4.2 MG/DL — SIGNIFICANT CHANGE UP (ref 2.5–4.5)
PLAT MORPH BLD: NORMAL — SIGNIFICANT CHANGE UP
PLATELET # BLD AUTO: 398 K/UL — SIGNIFICANT CHANGE UP (ref 150–400)
PLATELET # BLD AUTO: 435 K/UL — HIGH (ref 150–400)
POLYCHROMASIA BLD QL SMEAR: SLIGHT — SIGNIFICANT CHANGE UP
POTASSIUM SERPL-MCNC: 4.2 MMOL/L — SIGNIFICANT CHANGE UP (ref 3.5–5.3)
POTASSIUM SERPL-MCNC: 4.4 MMOL/L — SIGNIFICANT CHANGE UP (ref 3.5–5.3)
POTASSIUM SERPL-SCNC: 4.2 MMOL/L — SIGNIFICANT CHANGE UP (ref 3.5–5.3)
POTASSIUM SERPL-SCNC: 4.4 MMOL/L — SIGNIFICANT CHANGE UP (ref 3.5–5.3)
PREALB SERPL-MCNC: 17 MG/DL — LOW (ref 18–38)
PROT SERPL-MCNC: 6.5 GM/DL — SIGNIFICANT CHANGE UP (ref 6–8.3)
PROT SERPL-MCNC: 6.7 G/DL — SIGNIFICANT CHANGE UP (ref 6.6–8.7)
PROT UR-MCNC: 15 MG/DL
PROTHROM AB SERPL-ACNC: 15.5 SEC — HIGH (ref 10.6–13.6)
RBC # BLD: 4.21 M/UL — SIGNIFICANT CHANGE UP (ref 4.2–5.8)
RBC # BLD: 4.5 M/UL — SIGNIFICANT CHANGE UP (ref 4.2–5.8)
RBC # FLD: 16.1 % — HIGH (ref 10.3–14.5)
RBC # FLD: 16.2 % — HIGH (ref 10.3–14.5)
RBC BLD AUTO: SIGNIFICANT CHANGE UP
SARS-COV-2 IGG+IGM SERPL QL IA: 0.4 U/ML — SIGNIFICANT CHANGE UP
SARS-COV-2 IGG+IGM SERPL QL IA: NEGATIVE — SIGNIFICANT CHANGE UP
SODIUM SERPL-SCNC: 135 MMOL/L — SIGNIFICANT CHANGE UP (ref 135–145)
SODIUM SERPL-SCNC: 136 MMOL/L — SIGNIFICANT CHANGE UP (ref 135–145)
SP GR SPEC: 1.01 — SIGNIFICANT CHANGE UP (ref 1.01–1.02)
SPECIMEN SOURCE: SIGNIFICANT CHANGE UP
SPECIMEN SOURCE: SIGNIFICANT CHANGE UP
UROBILINOGEN FLD QL: 1 MG/DL
WBC # BLD: 17.03 K/UL — HIGH (ref 3.8–10.5)
WBC # BLD: 20.19 K/UL — HIGH (ref 3.8–10.5)
WBC # FLD AUTO: 17.03 K/UL — HIGH (ref 3.8–10.5)
WBC # FLD AUTO: 20.19 K/UL — HIGH (ref 3.8–10.5)

## 2021-08-13 PROCEDURE — ZZZZZ: CPT

## 2021-08-13 PROCEDURE — 99223 1ST HOSP IP/OBS HIGH 75: CPT

## 2021-08-13 PROCEDURE — 36620 INSERTION CATHETER ARTERY: CPT

## 2021-08-13 PROCEDURE — 93306 TTE W/DOPPLER COMPLETE: CPT | Mod: 26

## 2021-08-13 PROCEDURE — 99221 1ST HOSP IP/OBS SF/LOW 40: CPT

## 2021-08-13 PROCEDURE — 93010 ELECTROCARDIOGRAM REPORT: CPT

## 2021-08-13 PROCEDURE — 71275 CT ANGIOGRAPHY CHEST: CPT | Mod: 26

## 2021-08-13 PROCEDURE — 71045 X-RAY EXAM CHEST 1 VIEW: CPT | Mod: 26

## 2021-08-13 PROCEDURE — 99239 HOSP IP/OBS DSCHRG MGMT >30: CPT

## 2021-08-13 PROCEDURE — 99223 1ST HOSP IP/OBS HIGH 75: CPT | Mod: 57

## 2021-08-13 RX ORDER — VECURONIUM BROMIDE 20 MG/1
5 INJECTION, POWDER, FOR SOLUTION INTRAVENOUS ONCE
Refills: 0 | Status: COMPLETED | OUTPATIENT
Start: 2021-08-13 | End: 2021-08-13

## 2021-08-13 RX ORDER — ALPRAZOLAM 0.25 MG
0.5 TABLET ORAL EVERY 8 HOURS
Refills: 0 | Status: DISCONTINUED | OUTPATIENT
Start: 2021-08-13 | End: 2021-08-13

## 2021-08-13 RX ORDER — FENTANYL CITRATE 50 UG/ML
0.5 INJECTION INTRAVENOUS
Qty: 2500 | Refills: 0 | Status: DISCONTINUED | OUTPATIENT
Start: 2021-08-13 | End: 2021-08-16

## 2021-08-13 RX ORDER — SODIUM CHLORIDE 9 MG/ML
3 INJECTION INTRAMUSCULAR; INTRAVENOUS; SUBCUTANEOUS EVERY 8 HOURS
Refills: 0 | Status: DISCONTINUED | OUTPATIENT
Start: 2021-08-13 | End: 2021-08-16

## 2021-08-13 RX ORDER — PROPOFOL 10 MG/ML
40 INJECTION, EMULSION INTRAVENOUS
Qty: 1000 | Refills: 0 | Status: DISCONTINUED | OUTPATIENT
Start: 2021-08-13 | End: 2021-08-16

## 2021-08-13 RX ORDER — TRAZODONE HCL 50 MG
100 TABLET ORAL AT BEDTIME
Refills: 0 | Status: DISCONTINUED | OUTPATIENT
Start: 2021-08-13 | End: 2021-08-13

## 2021-08-13 RX ORDER — PROPOFOL 10 MG/ML
20 INJECTION, EMULSION INTRAVENOUS
Qty: 500 | Refills: 0 | Status: DISCONTINUED | OUTPATIENT
Start: 2021-08-13 | End: 2021-08-13

## 2021-08-13 RX ORDER — EPINEPHRINE 11.25MG/ML
0.5 SOLUTION, NON-ORAL INHALATION ONCE
Refills: 0 | Status: COMPLETED | OUTPATIENT
Start: 2021-08-13 | End: 2021-08-13

## 2021-08-13 RX ORDER — PANTOPRAZOLE SODIUM 20 MG/1
40 TABLET, DELAYED RELEASE ORAL DAILY
Refills: 0 | Status: DISCONTINUED | OUTPATIENT
Start: 2021-08-13 | End: 2021-08-16

## 2021-08-13 RX ORDER — RISPERIDONE 4 MG/1
1 TABLET ORAL
Refills: 0 | Status: DISCONTINUED | OUTPATIENT
Start: 2021-08-13 | End: 2021-08-13

## 2021-08-13 RX ORDER — VANCOMYCIN HCL 1 G
1000 VIAL (EA) INTRAVENOUS EVERY 12 HOURS
Refills: 0 | Status: DISCONTINUED | OUTPATIENT
Start: 2021-08-13 | End: 2021-08-13

## 2021-08-13 RX ORDER — IPRATROPIUM/ALBUTEROL SULFATE 18-103MCG
3 AEROSOL WITH ADAPTER (GRAM) INHALATION EVERY 6 HOURS
Refills: 0 | Status: DISCONTINUED | OUTPATIENT
Start: 2021-08-13 | End: 2021-08-16

## 2021-08-13 RX ORDER — CHLORHEXIDINE GLUCONATE 213 G/1000ML
15 SOLUTION TOPICAL EVERY 12 HOURS
Refills: 0 | Status: DISCONTINUED | OUTPATIENT
Start: 2021-08-13 | End: 2021-08-16

## 2021-08-13 RX ORDER — ENOXAPARIN SODIUM 100 MG/ML
40 INJECTION SUBCUTANEOUS DAILY
Refills: 0 | Status: DISCONTINUED | OUTPATIENT
Start: 2021-08-13 | End: 2021-08-13

## 2021-08-13 RX ORDER — DILTIAZEM HCL 120 MG
1 CAPSULE, EXT RELEASE 24 HR ORAL
Qty: 0 | Refills: 0 | DISCHARGE
Start: 2021-08-13

## 2021-08-13 RX ADMIN — ALBUTEROL 1 PUFF(S): 90 AEROSOL, METERED ORAL at 07:57

## 2021-08-13 RX ADMIN — PROPOFOL 15.1 MICROGRAM(S)/KG/MIN: 10 INJECTION, EMULSION INTRAVENOUS at 23:48

## 2021-08-13 RX ADMIN — SODIUM CHLORIDE 3 MILLILITER(S): 9 INJECTION INTRAMUSCULAR; INTRAVENOUS; SUBCUTANEOUS at 22:42

## 2021-08-13 RX ADMIN — Medication 0.5 MILLIGRAM(S): at 02:49

## 2021-08-13 RX ADMIN — ALBUTEROL 1 PUFF(S): 90 AEROSOL, METERED ORAL at 12:16

## 2021-08-13 RX ADMIN — Medication 60 MILLIGRAM(S): at 13:19

## 2021-08-13 RX ADMIN — Medication 60 MILLIGRAM(S): at 06:36

## 2021-08-13 RX ADMIN — Medication 40 MILLIGRAM(S): at 06:39

## 2021-08-13 RX ADMIN — Medication 3 MILLILITER(S): at 20:46

## 2021-08-13 RX ADMIN — VECURONIUM BROMIDE 5 MILLIGRAM(S): 20 INJECTION, POWDER, FOR SOLUTION INTRAVENOUS at 21:00

## 2021-08-13 RX ADMIN — TIOTROPIUM BROMIDE 1 CAPSULE(S): 18 CAPSULE ORAL; RESPIRATORY (INHALATION) at 12:16

## 2021-08-13 RX ADMIN — Medication 60 MILLIGRAM(S): at 22:40

## 2021-08-13 RX ADMIN — FENTANYL CITRATE 3.15 MICROGRAM(S)/KG/HR: 50 INJECTION INTRAVENOUS at 21:50

## 2021-08-13 RX ADMIN — Medication 0.5 MILLILITER(S): at 20:31

## 2021-08-13 NOTE — DIETITIAN INITIAL EVALUATION ADULT. - PERTINENT LABORATORY DATA
08-13    136  |  102  |  18  ----------------------------<  110<H>  4.4   |  31  |  0.64    Ca    9.5      13 Aug 2021 05:56  Phos  4.2     08-13  Mg     2.3     08-13    TPro  6.5  /  Alb  2.4<L>  /  TBili  0.4  /  DBili  x   /  AST  16  /  ALT  38  /  AlkPhos  112  08-13

## 2021-08-13 NOTE — CONSULT NOTE ADULT - ASSESSMENT
55 y/o M homeless with a PMH of smoker ( quit one month ago), polysubstance abuse ( cocaine , marijuana), COPD, depression recently diagnosed with lung cancer with a medialstinal mass invading the bronchus.    Plan   Transfer to [facility] for tracheal stenting and fulguration of mass  Dr Alexander will set up transfer and Dr Amaya is the excepting MD  Plan for OR Monday

## 2021-08-13 NOTE — CONSULT NOTE ADULT - SUBJECTIVE AND OBJECTIVE BOX
HPI:  57 y/o M homeless with a PMH of smoker ( quit one month ago), polysubstance abuse ( cocaine , marihuana), COPD, depression recently diagnosed with SCC lung cancer came to the ED BIBEMs from [place] due to respiratory distress. Pt reports was diagnosed with lung cancer after an admission on [facility] on 21 due to suicide attempt. He is s/p right back chest wall mass biopsy on 21 that showed poorly differentiated carcinoma and EBUS/ bronch by CT surgery was deferred. Pt was recently discharged from [facility] to [place] on 21.  Pt reports since was diagnosed with lung cancer is experiencing progressive SOB. SOB got worse since was d/c from [facility] one week ago. This morning patient woke up with extreme SOB, was gasping for air and EMS called. He endorses associated sweating, chest pain and palpitations during the event. Pt has been experiencing on/off substernal chest pain and palpitations since was dx with lung cancer. He describes chest pain as pressure like, non radiating and exacerbated with coughing. He endorse cough since approximately one month ago of yellow sputum and tingle blood on it. He states has hemoptysis since 21  but have been getting better since then. Last episode of hemoptysis was three days ago. He has loss approximately 10-20 pounds since .  He reports supposed to follow up with hemato-oncology but have not seen him yet. He has been receiving radiation being the second dose today but missed the appointment due was brought to the hospital. He denies fever, chills, nausea , vomits, leg swelling, orthopnea.     At arrival to ED patient found with /122, ,RR 40 , 96 % pO2 in a NRB. By EMS patient HR was 200  and pO2 90 and labored at ex. At arrival patient placed in NRB with pO2 96% and then transitioned to BiPAP. Chest X ray performed and showed 2cm right lower lung field mass and slightly bibasilar effusion. EKG showed Afib with RVR and premature ventricular complexes. Labs remarkable for WBC 24.56, Hgb 14.8 , Cr 1.19, , D dimer 295, trop neg x 2. Pt was started in a Cardizem drip with conversion of NSR. At ED patient received  Zosyn x once, Vancomycin x once, Duoneb , solumedrol. ICU evaluated patient and recommended to continue with BiPAP and no ICU care needed at this moment.  (12 Aug 2021 22:17)      PAST MEDICAL & SURGICAL HISTORY:  Schizophrenia    Bipolar disorder    Depression    Lung cancer    Polysubstance abuse    No significant past surgical history        MEDICATIONS  (STANDING):  ALBUTerol    90 MICROgram(s) HFA Inhaler 1 Puff(s) Inhalation every 4 hours  budesonide  80 MICROgram(s)/formoterol 4.5 MICROgram(s) Inhaler 2 Puff(s) Inhalation two times a day  diltiazem    Tablet 60 milliGRAM(s) Oral every 8 hours  enoxaparin Injectable 40 milliGRAM(s) SubCutaneous daily  methylPREDNISolone sodium succinate Injectable 40 milliGRAM(s) IV Push every 8 hours  tiotropium 18 MICROgram(s) Capsule 1 Capsule(s) Inhalation daily    MEDICATIONS  (PRN):  acetaminophen   Tablet .. 650 milliGRAM(s) Oral every 6 hours PRN Temp greater or equal to 38.5C (101.3F), Mild Pain (1 - 3)  ALPRAZolam 0.5 milliGRAM(s) Oral every 8 hours PRN anxiety  ondansetron Injectable 4 milliGRAM(s) IV Push every 8 hours PRN Nausea and/or Vomiting  oxyCODONE    IR 10 milliGRAM(s) Oral every 6 hours PRN Severe Pain (7 - 10)  oxyCODONE    IR 5 milliGRAM(s) Oral every 6 hours PRN Moderate Pain (4 - 6)      Allergies    Allergy Status Unknown    Intolerances    paper plates/tray and plastic utensils only (Unknown)      FAMILY HISTORY:  FH: diabetes mellitus (Mother)    FH: lung cancer  father        Review of Systems    Constitutional, Eyes, ENT, Cardiovascular, Respiratory, Gastrointestinal, Genitourinary, Musculoskeletal, Integumentary, Neurological, Psychiatric, Endocrine, Heme/Lymph and Allergic/Immunologic review of systems are otherwise negative except as noted in HPI.     Vital Signs Last 24 Hrs  T(C): 36.5 (13 Aug 2021 09:45), Max: 36.5 (12 Aug 2021 15:15)  T(F): 97.7 (13 Aug 2021 09:45), Max: 97.7 (12 Aug 2021 15:15)  HR: 91 (13 Aug 2021 10:00) (85 - 144)  BP: 138/81 (13 Aug 2021 10:00) (113/85 - 158/122)  BP(mean): 98 (13 Aug 2021 10:00) (95 - 114)  RR: 20 (13 Aug 2021 10:00) (13 - 40)  SpO2: 100% (13 Aug 2021 10:00) (92% - 100%)    PHYSICAL EXAM:    GENERAL: NAD,  HEAD:  Atraumatic, Normocephalic  EYES: EOMI, PERRLA, conjunctiva and sclera clear  ENMT: No tonsillar erythema, exudates, or enlargement; Moist mucous membranes, Good dentition, No lesions  NECK: Supple, No JVD, Normal thyroid  NERVOUS SYSTEM:  Alert & Oriented X3,   CHEST/LUNG: Clear to auscultation bilaterally; No rales, rhonchi, wheezing, or rubs  HEART: Regular rate and rhythm; No murmurs, rubs, or gallops  ABDOMEN: Soft, Nontender, Nondistended; Bowel sounds present  EXTREMITIES:  2+ Peripheral Pulses, No clubbing, cyanosis, or edema  LYMPH: No lymphadenopathy noted  SKIN: 3x3 cm lesion on upper back     LABS:  CBC Full  -  ( 13 Aug 2021 05:56 )  WBC Count : 17.03 K/uL  RBC Count : 4.21 M/uL  Hemoglobin : 12.3 g/dL  Hematocrit : 38.0 %  Platelet Count - Automated : 398 K/uL  Mean Cell Volume : 90.3 fl  Mean Cell Hemoglobin : 29.2 pg  Mean Cell Hemoglobin Concentration : 32.4 gm/dL  Auto Neutrophil # : 15.92 K/uL  Auto Lymphocyte # : 0.33 K/uL  Auto Monocyte # : 0.62 K/uL  Auto Eosinophil # : 0.00 K/uL  Auto Basophil # : 0.03 K/uL  Auto Neutrophil % : 93.5 %  Auto Lymphocyte % : 1.9 %  Auto Monocyte % : 3.6 %  Auto Eosinophil % : 0.0 %  Auto Basophil % : 0.2 %        136  |  102  |  18  ----------------------------<  110<H>  4.4   |  31  |  0.64    Ca    9.5      13 Aug 2021 05:56  Phos  4.2     08-  Mg     2.3         TPro  6.5  /  Alb  2.4<L>  /  TBili  0.4  /  DBili  x   /  AST  16  /  ALT  38  /  AlkPhos  112      PT/INR - ( 12 Aug 2021 15:11 )   PT: 15.4 sec;   INR: 1.33 ratio         PTT - ( 12 Aug 2021 15:11 )  PTT:31.4 sec  Urinalysis Basic - ( 12 Aug 2021 16:48 )    Color: Yellow / Appearance: Clear / S.015 / pH: x  Gluc: x / Ketone: Negative  / Bili: Negative / Urobili: Negative   Blood: x / Protein: Negative / Nitrite: Negative   Leuk Esterase: Negative / RBC: 3-5 /HPF / WBC 3-5   Sq Epi: x / Non Sq Epi: Occasional / Bacteria: Occasional        RADIOLOGY & ADDITIONAL STUDIES:    < from: CT Angio Chest PE Protocol w/ IV Cont (21 @ 00:21) >  EXAM:  CT ANGIO CHEST PULM [facility]                            PROCEDURE DATE:  2021          INTERPRETATION:  CLINICAL INFORMATION: Cancer. Pneumonia. Respiratory distress. Evaluate for pulmonary embolism    COMPARISON: CT chest 2021.    CONTRAST/COMPLICATIONS:  IV Contrast: Omnipaque 350  90 cc administered   10 cc discarded  Oral Contrast: NONE  Complications: None reported at time of study completion    PROCEDURE:  CT Angiography of the Chest.  Sagittal and coronal reformats wereperformed as well as 3D (MIP) reconstructions.    FINDINGS:    LUNGS AND AIRWAYS: There is marked narrowing of the bilateral mainstem bronchus. The right lower lobe bronchus intermedius is occluded and there is distal impaction of the bronchi. There is no associated atelectasis. Severe emphysema. 3.2 x 2.7 cm right middle lobe mass is stable. 4 mm right lower lobe nodule (2:90), unchanged. Seen right lower lobe opacity has resolved. There are a few scattered calcified subcentimeter granulomas.  PLEURA: No pleural effusion.  MEDIASTINUM AND RAULITO: Right paratracheal lymph node measures 3.3 x 2.9 cm previously 2.8 x 2.4 cm. A subcarinal mass measures approximately 6.1 x 5.0 cm previously 4.8 x 4.7 cm.  VESSELS: There is no pulmonary embolism. Subcarinal mediastinal mass mildly narrows the right pulmonary artery which remains patent.  HEART: Heart size is normal. No pericardial effusion.  CHEST WALL AND LOWER NECK: Previously seen right posterior 3.3 cm mass is no longer present.  VISUALIZED UPPER ABDOMEN: 3.0 cm indeterminant hypodense and is in size. Marked thickening of the left adrenal gland is stable.  BONES: No changes.    IMPRESSION:  No pulmonary embolism.    Enlarging mediastinal masses which narrow the airways are as described above.    Stable right middle lobe mass.      < end of copied text >    CT CHEST :   *  Lateral segment right middle lobe spiculated nodule measures 3.1 x 2.6 cm abutting the major fissure and possibly crossing the fissure indicative of primary lung neoplasm.  *  Extensive mediastinal and hilar lymphadenopathy extending to the right upper paratracheal nodes.  *  Large subcarinal lymph node results in severe narrowing of the bronchus intermedius and left mainstem bronchus.  *  2.4 x 1.8 cm indeterminate left adrenal nodule  *  Right posterior chest wall metastasis with suggestion of overlying skin ulceration measuring 3.3 x 2.1 cm (3-108). Correlate with physical exam and tissue sampling.  *  Bibasilar patchy airspace disease, likely infectious.         HPI:  57 y/o M homeless with a PMH of smoker ( quit one month ago), polysubstance abuse ( cocaine , marihuana), COPD, depression recently diagnosed with SCC lung cancer came to the ED BIBEMs from [place] due to respiratory distress. Pt reports was diagnosed with lung cancer after an admission on [facility] on 21 due to suicide attempt. He is s/p right back chest wall mass biopsy on 21 that showed poorly differentiated carcinoma and EBUS/ bronch by CT surgery was deferred. Pt was recently discharged from [facility] to [place] on 21.  Pt reports since was diagnosed with lung cancer is experiencing progressive SOB. SOB got worse since was d/c from [facility] one week ago. This morning patient woke up with extreme SOB, was gasping for air and EMS called. He endorses associated sweating, chest pain and palpitations during the event. Pt has been experiencing on/off substernal chest pain and palpitations since was dx with lung cancer. He describes chest pain as pressure like, non radiating and exacerbated with coughing. He endorse cough since approximately one month ago of yellow sputum and tingle blood on it. He states has hemoptysis since 21  but have been getting better since then. Last episode of hemoptysis was three days ago. He has loss approximately 10-20 pounds since .  He reports supposed to follow up with hemato-oncology but have not seen him yet. He has been receiving radiation being the second dose today but missed the appointment due was brought to the hospital. He denies fever, chills, nausea , vomits, leg swelling, orthopnea.     At arrival to ED patient found with /122, ,RR 40 , 96 % pO2 in a NRB. By EMS patient HR was 200  and pO2 90 and labored at ex. At arrival patient placed in NRB with pO2 96% and then transitioned to BiPAP. Chest X ray performed and showed 2cm right lower lung field mass and slightly bibasilar effusion. EKG showed Afib with RVR and premature ventricular complexes. Labs remarkable for WBC 24.56, Hgb 14.8 , Cr 1.19, , D dimer 295, trop neg x 2. Pt was started in a Cardizem drip with conversion of NSR. At ED patient received  Zosyn x once, Vancomycin x once, Duoneb , solumedrol. ICU evaluated patient and recommended to continue with BiPAP and no ICU care needed at this moment.  (12 Aug 2021 22:17)    Came by to see patient but already discharged to [facility].    PAST MEDICAL & SURGICAL HISTORY:  Schizophrenia    Bipolar disorder    Depression    Lung cancer    Polysubstance abuse    No significant past surgical history        MEDICATIONS  (STANDING):  ALBUTerol    90 MICROgram(s) HFA Inhaler 1 Puff(s) Inhalation every 4 hours  budesonide  80 MICROgram(s)/formoterol 4.5 MICROgram(s) Inhaler 2 Puff(s) Inhalation two times a day  diltiazem    Tablet 60 milliGRAM(s) Oral every 8 hours  enoxaparin Injectable 40 milliGRAM(s) SubCutaneous daily  methylPREDNISolone sodium succinate Injectable 40 milliGRAM(s) IV Push every 8 hours  tiotropium 18 MICROgram(s) Capsule 1 Capsule(s) Inhalation daily    MEDICATIONS  (PRN):  acetaminophen   Tablet .. 650 milliGRAM(s) Oral every 6 hours PRN Temp greater or equal to 38.5C (101.3F), Mild Pain (1 - 3)  ALPRAZolam 0.5 milliGRAM(s) Oral every 8 hours PRN anxiety  ondansetron Injectable 4 milliGRAM(s) IV Push every 8 hours PRN Nausea and/or Vomiting  oxyCODONE    IR 10 milliGRAM(s) Oral every 6 hours PRN Severe Pain (7 - 10)  oxyCODONE    IR 5 milliGRAM(s) Oral every 6 hours PRN Moderate Pain (4 - 6)      Allergies    Allergy Status Unknown    Intolerances    paper plates/tray and plastic utensils only (Unknown)      FAMILY HISTORY:  FH: diabetes mellitus (Mother)    FH: lung cancer  father        Review of Systems    Constitutional, Eyes, ENT, Cardiovascular, Respiratory, Gastrointestinal, Genitourinary, Musculoskeletal, Integumentary, Neurological, Psychiatric, Endocrine, Heme/Lymph and Allergic/Immunologic review of systems are otherwise negative except as noted in HPI.     Vital Signs Last 24 Hrs  T(C): 36.5 (13 Aug 2021 09:45), Max: 36.5 (12 Aug 2021 15:15)  T(F): 97.7 (13 Aug 2021 09:45), Max: 97.7 (12 Aug 2021 15:15)  HR: 91 (13 Aug 2021 10:00) (85 - 144)  BP: 138/81 (13 Aug 2021 10:00) (113/85 - 158/122)  BP(mean): 98 (13 Aug 2021 10:00) (95 - 114)  RR: 20 (13 Aug 2021 10:00) (13 - 40)  SpO2: 100% (13 Aug 2021 10:00) (92% - 100%)      LABS:  CBC Full  -  ( 13 Aug 2021 05:56 )  WBC Count : 17.03 K/uL  RBC Count : 4.21 M/uL  Hemoglobin : 12.3 g/dL  Hematocrit : 38.0 %  Platelet Count - Automated : 398 K/uL  Mean Cell Volume : 90.3 fl  Mean Cell Hemoglobin : 29.2 pg  Mean Cell Hemoglobin Concentration : 32.4 gm/dL  Auto Neutrophil # : 15.92 K/uL  Auto Lymphocyte # : 0.33 K/uL  Auto Monocyte # : 0.62 K/uL  Auto Eosinophil # : 0.00 K/uL  Auto Basophil # : 0.03 K/uL  Auto Neutrophil % : 93.5 %  Auto Lymphocyte % : 1.9 %  Auto Monocyte % : 3.6 %  Auto Eosinophil % : 0.0 %  Auto Basophil % : 0.2 %    13    136  |  102  |  18  ----------------------------<  110<H>  4.4   |  31  |  0.64    Ca    9.5      13 Aug 2021 05:56  Phos  4.2     08-13  Mg     2.3     08-13    TPro  6.5  /  Alb  2.4<L>  /  TBili  0.4  /  DBili  x   /  AST  16  /  ALT  38  /  AlkPhos  112  08-13    PT/INR - ( 12 Aug 2021 15:11 )   PT: 15.4 sec;   INR: 1.33 ratio         PTT - ( 12 Aug 2021 15:11 )  PTT:31.4 sec  Urinalysis Basic - ( 12 Aug 2021 16:48 )    Color: Yellow / Appearance: Clear / S.015 / pH: x  Gluc: x / Ketone: Negative  / Bili: Negative / Urobili: Negative   Blood: x / Protein: Negative / Nitrite: Negative   Leuk Esterase: Negative / RBC: 3-5 /HPF / WBC 3-5   Sq Epi: x / Non Sq Epi: Occasional / Bacteria: Occasional        RADIOLOGY & ADDITIONAL STUDIES:    < from: CT Angio Chest PE Protocol w/ IV Cont (21 @ 00:21) >  EXAM:  CT ANGIO CHEST PULM [facility]                            PROCEDURE DATE:  2021          INTERPRETATION:  CLINICAL INFORMATION: Cancer. Pneumonia. Respiratory distress. Evaluate for pulmonary embolism    COMPARISON: CT chest 2021.    CONTRAST/COMPLICATIONS:  IV Contrast: Omnipaque 350  90 cc administered   10 cc discarded  Oral Contrast: NONE  Complications: None reported at time of study completion    PROCEDURE:  CT Angiography of the Chest.  Sagittal and coronal reformats wereperformed as well as 3D (MIP) reconstructions.    FINDINGS:    LUNGS AND AIRWAYS: There is marked narrowing of the bilateral mainstem bronchus. The right lower lobe bronchus intermedius is occluded and there is distal impaction of the bronchi. There is no associated atelectasis. Severe emphysema. 3.2 x 2.7 cm right middle lobe mass is stable. 4 mm right lower lobe nodule (2:90), unchanged. Seen right lower lobe opacity has resolved. There are a few scattered calcified subcentimeter granulomas.  PLEURA: No pleural effusion.  MEDIASTINUM AND RAULITO: Right paratracheal lymph node measures 3.3 x 2.9 cm previously 2.8 x 2.4 cm. A subcarinal mass measures approximately 6.1 x 5.0 cm previously 4.8 x 4.7 cm.  VESSELS: There is no pulmonary embolism. Subcarinal mediastinal mass mildly narrows the right pulmonary artery which remains patent.  HEART: Heart size is normal. No pericardial effusion.  CHEST WALL AND LOWER NECK: Previously seen right posterior 3.3 cm mass is no longer present.  VISUALIZED UPPER ABDOMEN: 3.0 cm indeterminant hypodense and is in size. Marked thickening of the left adrenal gland is stable.  BONES: No changes.    IMPRESSION:  No pulmonary embolism.    Enlarging mediastinal masses which narrow the airways are as described above.    Stable right middle lobe mass.      < end of copied text >    CT CHEST :   *  Lateral segment right middle lobe spiculated nodule measures 3.1 x 2.6 cm abutting the major fissure and possibly crossing the fissure indicative of primary lung neoplasm.  *  Extensive mediastinal and hilar lymphadenopathy extending to the right upper paratracheal nodes.  *  Large subcarinal lymph node results in severe narrowing of the bronchus intermedius and left mainstem bronchus.  *  2.4 x 1.8 cm indeterminate left adrenal nodule  *  Right posterior chest wall metastasis with suggestion of overlying skin ulceration measuring 3.3 x 2.1 cm (3-108). Correlate with physical exam and tissue sampling.  *  Bibasilar patchy airspace disease, likely infectious.

## 2021-08-13 NOTE — H&P ADULT - HISTORY OF PRESENT ILLNESS
57 y/o M homeless with a PMH of smoker ( quit one month ago), polysubstance abuse ( cocaine , marihuana), COPD, depression recently diagnosed with lung cancer came to the ED BIBEMs from [place] due to respiratory distress. Pt reports was diagnosed with lung cancer after an admission on [facility] on 7/7/21 due to suicide attempt. He is s/p right back chest wall mass biopsy on 7/8/21 that showed poorly differentiated carcinoma and EBUS/ bronch by CT surgery was deferred. Pt was recently discharged from [facility] to [place] on 8/6/21.Pt reports since was diagnosed with lung cancer is experiencing progressive SOB. SOB got worse since was d/c from [facility] one week ago. This morning patient wake up with extreme SOB, was gasping for air and EMS called. He endorses associated sweating, chest pain and palpitations during the event. Pt have been experiencing on/off  substernal chest pain and palpitations since was dx with lung cancer. He describes chest pain as pressure like, non radiating and exacerbated with coughing. He endorse cough since approximately one month ago of yellow sputum and tingle blood on it. He states has hemoptysis since 8/2/21  but have been getting better since then. Last episode of hemoptysis was three days ago. He have loss approximately 10-20 pounds since July, 2021.He reports supposed to follow up with hematology- oncology but have not seen  him yet. He has been receiving radiation being the second dose today but missed the appointment due was but missed the appointment due was brought to the hospital. He denies fever, chills, nausea , vomits, leg swelling, orthopnea. Patient had a CT of chest showing an endobronchial lesion.

## 2021-08-13 NOTE — H&P ADULT - PROBLEM SELECTOR PLAN 1
patient comes in with endobronchial mass plan for possible OR Monday   Patient will be NPO for now   Continue steroids, monie   KEEP HOB 45 degrees at all time   Dr. Amaya knows about the above and at this time we will watch patient unless respiratory status changes   Critical Airway team notified.

## 2021-08-13 NOTE — DISCHARGE NOTE NURSING/CASE MANAGEMENT/SOCIAL WORK - NSTRANSFERBELONGINGSRESP_GEN_A_NUR
Pt presents to the ED in care of mom and grandmother with a rash that started this morning. The rash started on her face and has since spread around her body. The rash is scattered on her face, trunk, groin, legs, and arm. Rash presents as raised, and red bumps. Pt was on amoxicillin for an ear infection and was given her last dose on Tuesday.    yes

## 2021-08-13 NOTE — DISCHARGE NOTE PROVIDER - DETAILS OF MALNUTRITION DIAGNOSIS/DIAGNOSES
This patient has been assessed with a concern for Malnutrition and was treated during this hospitalization for the following Nutrition diagnosis/diagnoses:     -  08/13/2021: Severe protein-calorie malnutrition   -  08/13/2021: Underweight (BMI < 19)

## 2021-08-13 NOTE — DIETITIAN INITIAL EVALUATION ADULT. - ORAL INTAKE PTA/DIET HISTORY
pt reports eating well (3 meals/day, 100% of meals) at NH with ensure strawberry; no diet Rx in paperwork.  Likely given wt loss, pt with inadequate PO intake for increased nutr needs.

## 2021-08-13 NOTE — DIETITIAN INITIAL EVALUATION ADULT. - OTHER INFO
57yo male, homeless, with PMH significant for smoking (quit 1 mnth ago), polysubstance abuse (cocaine and marijuana), COPD, depression, and recent Dx of lung CA p/w resp distress.  Pt admitted with acute hypoxemic resp failure in setting of lung CA, new onset Afib, lung CA with Rt back chest wall mass.  Pt pending transfer to [facility].

## 2021-08-13 NOTE — CONSULT NOTE ADULT - ASSESSMENT
57 y/o male with a 40 PY smoker, homeless, with hx of recent suicidal Ideation and Cocaine and THC substance abuse history.  Recently dx with SCC PDL1 10-15%, with respiratory distress.  Began RT at [facility].    CT CHEST with lateral right middle lobe spiculated nodule (3.1 x 2.6 cm)  abutting the major fissure and possibly crossing the fissure indicative of primary lung neoplasm.  Extensive mediastinal and hilar lymphadenopathy extending to the right upper paratracheal nodes.  Large subcarinal lymph node results in severe narrowing of the bronchus intermedius and left mainstem bronchus. 2.4 x 1.8 cm indeterminate left adrenal nodule. Right posterior chest wall metastasis with suggestion of overlying skin ulceration measuring 3.3 x 2.1 cm (3-108). Correlate with physical exam and tissue sampling.      Acute hypoxemic respiratory failure in the setting of lung cancer  - suspected concomitant COPD exacerbation,   - reviewed 8/13 CT:  No evidence of obstructive PNA or PE.  Enlarging mediastinal masses which narrow the airways.  Currently undergoing RT at [facility].  - s/p Solumedrol 125 mg IV once and Duoneb   - s/p Zosyn and Vancomycin in the ED   - pulmonary and RT onc consult   - Discussed with Dr. Alexander; pt was recommended by thoracic to transfer for inpatient RT to [facility].  Of note, per pt, started RT at [facility].    -will need outpt follow up with med onc, Dr. Bhatt in [place], who saw the patient in July at [facility].

## 2021-08-13 NOTE — CHART NOTE - NSCHARTNOTEFT_GEN_A_CORE
57yo M transferred from  with endobronchial mass is tachypnic, tachycardic and hypertensive with c/o "I can't breath, I can't get any air".  Pt initially given racemic epi x 1 with no improvement.  Pt then required emergent intubation, performed by general anesthesia.  A line inserted for hypertension and ventilator management.  Post intubation, HR 120s sinus tachycardia (down from 150s), , ABG pending.    Dr Amaya aware and agrees with above plan.

## 2021-08-13 NOTE — DIETITIAN INITIAL EVALUATION ADULT. - +GENDER
Patient stated that the pharmacy is stating that they do not have the refill.  Please call the pharmacy as soon as possible.   Statement Selected

## 2021-08-13 NOTE — DISCHARGE NOTE PROVIDER - NSDCCPCAREPLAN_GEN_ALL_CORE_FT
PRINCIPAL DISCHARGE DIAGNOSIS  Diagnosis: Acute respiratory failure with hypoxia  Assessment and Plan of Treatment: transfer to [facility] for stent      SECONDARY DISCHARGE DIAGNOSES  Diagnosis: Atrial fibrillation  Assessment and Plan of Treatment:     Diagnosis: COPD exacerbation  Assessment and Plan of Treatment:

## 2021-08-13 NOTE — DIETITIAN INITIAL EVALUATION ADULT. - MALNUTRITION
severe malnutrition in chronic illness severe malnutrition in chronic illness r/t decreased ability to meet increased nutr needs 2/2 CA AEB meeting <75% of ENN x 1 mo; 26% wt loss x 1 mo

## 2021-08-13 NOTE — H&P ADULT - ATTENDING COMMENTS
Patient seen and examined. Known to thoracic surgery from previous admissions. Unfortunately, Patient is homeless and he was recently diagnosed with poorly differentiated metastatic lung cancer. He has undergone two radiation sessions thus far. Unsure if he got started on chemotherapy. Presented to [facility] with shortness of breath, wheeze. CT chest shows a large subcarinal mass that it compressing the right/left mainstem and also appears the bronchus intermedius. He is airating all lobes of the lung. He is a candidate for bronchial stenting based on imaging. He states that he wants everything done. He does not have family or friends for decision making if he cannot make decisions. He is agreeable to flex bronch, laser therapy of endobronchial tumor, and stenting.  He understands that risks are loss of airway and possible death,     I spent greater than 110 min coordinating care, transfer to high level of care, speaking with consults and having the appropriate stents available

## 2021-08-13 NOTE — PATIENT PROFILE ADULT - BRAND OF COVID-19 VACCINATION
Hpi Title: Evaluation of Skin Lesions How Severe Are Your Spot(S)?: mild Have Your Spot(S) Been Treated In The Past?: has not been treated Location: Nose and left scalp Year Removed: Nose many years ago and scalp 2014 Pfizer dose 1 and 2

## 2021-08-13 NOTE — DIETITIAN INITIAL EVALUATION ADULT. - PERTINENT MEDS FT
MEDICATIONS  (STANDING):  ALBUTerol    90 MICROgram(s) HFA Inhaler 1 Puff(s) Inhalation every 4 hours  budesonide  80 MICROgram(s)/formoterol 4.5 MICROgram(s) Inhaler 2 Puff(s) Inhalation two times a day  diltiazem    Tablet 60 milliGRAM(s) Oral every 8 hours  enoxaparin Injectable 40 milliGRAM(s) SubCutaneous daily  methylPREDNISolone sodium succinate Injectable 40 milliGRAM(s) IV Push every 8 hours  tiotropium 18 MICROgram(s) Capsule 1 Capsule(s) Inhalation daily    MEDICATIONS  (PRN):  acetaminophen   Tablet .. 650 milliGRAM(s) Oral every 6 hours PRN Temp greater or equal to 38.5C (101.3F), Mild Pain (1 - 3)  ALPRAZolam 0.5 milliGRAM(s) Oral every 8 hours PRN anxiety  ondansetron Injectable 4 milliGRAM(s) IV Push every 8 hours PRN Nausea and/or Vomiting  oxyCODONE    IR 10 milliGRAM(s) Oral every 6 hours PRN Severe Pain (7 - 10)  oxyCODONE    IR 5 milliGRAM(s) Oral every 6 hours PRN Moderate Pain (4 - 6)

## 2021-08-13 NOTE — CONSULT NOTE ADULT - SUBJECTIVE AND OBJECTIVE BOX
Surgeon: Jose Luis    Consult requesting by: Benjamin     HISTORY OF PRESENT ILLNESS:  55 y/o M homeless with a PMH of smoker ( quit one month ago), polysubstance abuse ( cocaine , marihuana), COPD, depression recently diagnosed with lung cancer came to the ED BIBEMs from [place] due to respiratory distress. Pt reports was diagnosed with lung cancer after an admission on [facility] on 21 due to suicide attempt. He is s/p right back chest wall mass biopsy on 21 that showed poorly differentiated carcinoma andEBUS/ bronch by CT surgery was deferred. Pt was recently discharged from [facility] to [place] on 21.Pt reports since was diagnosed with lung cancer is experiencing progressive SOB. SOB got worse since was d/c from [facility] one week ago. This morning patient wake up with extreme SOB, was gasping for air and EMS called. He endorses associated sweating, chest pain and palpitations during the event. Pt have been experiencing on/off  substernal chest pain and palpitations since was dx with lung cancer. He describes chest pain as pressure like, non radiating and exacerbated with coughing. He endorse cough since approximately one month ago of yellow sputum and tingle blood on it. He states has hemoptysis since 21  but have been getting better since then. Last episode of hemoptysis was three days ago. He have loss approximately 10-20 pounds since .He reports supposed to follow up with hemato-oncology but have not seen  him yet. He has been receiving radiation being the second dose today but missed the appointment due was brought to the hospital. He denies fever, chills, nausea , vomits, leg swelling, orthopnea.     At arrival to ED patient found with /122, ,RR 40 , 96 % pO2 in a NRB. By EMS patient HR was 200  and pO2 90 and labored at ex. At arrival patient placed in NRB with pO2 96% and then transitioned to BiPAP. Chest X ray performed and showed 2cm right lower lung field mass and slightly bibasilar effusion. EKG showed Afib with RVR and premature ventricular complexes. Labs remarkable for WBC 24.56, Hgb 14.8 , Cr 1.19, , D dimer 295, trop neg x 2. Pt was started in a Cardizem drip with conversion of NSR. At ED patient received  Zosyn x once, Vancomycin x once, Duoneb , solumedrol. ICU evaluated patient and recommended to continue with BiPAP and no ICU care needed at this moment.   (H&P)     PAST MEDICAL & SURGICAL HISTORY:  Schizophrenia    Bipolar disorder    Depression    Lung cancer    Polysubstance abuse    No significant past surgical history        MEDICATIONS  (STANDING):  ALBUTerol    90 MICROgram(s) HFA Inhaler 1 Puff(s) Inhalation every 4 hours  budesonide  80 MICROgram(s)/formoterol 4.5 MICROgram(s) Inhaler 2 Puff(s) Inhalation two times a day  diltiazem    Tablet 60 milliGRAM(s) Oral every 8 hours  enoxaparin Injectable 40 milliGRAM(s) SubCutaneous daily  methylPREDNISolone sodium succinate Injectable 40 milliGRAM(s) IV Push every 8 hours  tiotropium 18 MICROgram(s) Capsule 1 Capsule(s) Inhalation daily    MEDICATIONS  (PRN):  acetaminophen   Tablet .. 650 milliGRAM(s) Oral every 6 hours PRN Temp greater or equal to 38.5C (101.3F), Mild Pain (1 - 3)  ALPRAZolam 0.5 milliGRAM(s) Oral every 8 hours PRN anxiety  ondansetron Injectable 4 milliGRAM(s) IV Push every 8 hours PRN Nausea and/or Vomiting  oxyCODONE    IR 10 milliGRAM(s) Oral every 6 hours PRN Severe Pain (7 - 10)  oxyCODONE    IR 5 milliGRAM(s) Oral every 6 hours PRN Moderate Pain (4 - 6)    Antiplatelet therapy:     None                       Last dose/amt:    Allergies    Allergy Status Unknown    Intolerances    paper plates/tray and plastic utensils only (Unknown)      SOCIAL HISTORY:  Smoker: [ x] Yes  [ ] No        PACK YEARS:                         WHEN QUIT? 1 mth ago   ETOH use: [ ] Yes  [ x] No              FREQUENCY / QUANTITY:  Ilicit Drug use:  [x ] Yes  [ ] No cocaine and marijuana   Occupation: none   Live with: homeless   Assisted device use:    FAMILY HISTORY:  FH: diabetes mellitus (Mother)    FH: lung cancer  father        Review of Systems  CONSTITUTIONAL:  Fevers[ ] chills[ ] sweats[ ] fatigue[ ] weight loss[x ] weight gain [ ]                                     NEGATIVE [ ]   NEURO:  parathesias[ ] seizures [ ]  syncope [ ]  confusion [ ]                                                                                NEGATIVE[x ]   EYES: glasses[ ]  blurry vision[ ]  discharge[ ] pain[ ] glaucoma [ ]                                                                          NEGATIVE[x ]   ENMT:  difficulty hearing [ ]  vertigo[ ]  dysphagia[ ] epistaxis[ ] recent dental work [ ]                                    NEGATIVE[ ]   CV:  chest pain[ ] palpitations[ ] FONG [x ] diaphoresis [ ]                                                                                           NEGATIVE[ ]   RESPIRATORY:  wheezing[ x] SOB[ x] cough [x ] sputum[ ] hemoptysis[ ]                                                                  NEGATIVE[ ]   GI:  nausea[ ]  vomiting [ ]  diarrhea[ ] constipation [ ] melena [ ]                                                                      NEGATIVE[x ]   : hematuria[ ]  dysuria[ ] urgency[ ] incontinence[ ]                                                                                            NEGATIVE[x ]   MUSKULOSKELETAL:  arthritis[ ]  joint swelling [ ] muscle weakness [ ]                                                                NEGATIVE[x ]   SKIN/BREAST:  rash[ ] itching [ ]  hair loss[ ] masses[ ]                                                                                              NEGATIVE[x ]   PSYCH:  dementia [ ] depression [x ] anxiety[x ]                                                                                                               NEGATIVE[ ]   HEME/LYMPH:  bruises easily[ ] enlarged lymph nodes[ ] tender lymph nodes[ ]                                               NEGATIVE[x ]   ENDOCRINE:  cold intolerance[ ] heat intolerance[ ] polydipsia[ ]                                                                          NEGATIVE[x ]     PHYSICAL EXAM  Vital Signs Last 24 Hrs  T(C): 36.5 (13 Aug 2021 09:45), Max: 36.5 (12 Aug 2021 15:15)  T(F): 97.7 (13 Aug 2021 09:45), Max: 97.7 (12 Aug 2021 15:15)  HR: 91 (13 Aug 2021 10:00) (85 - 144)  BP: 138/81 (13 Aug 2021 10:00) (113/85 - 158/122)  BP(mean): 98 (13 Aug 2021 10:00) (95 - 114)  RR: 20 (13 Aug 2021 10:00) (13 - 40)  SpO2: 100% (13 Aug 2021 10:00) (92% - 100%)    CONSTITUTIONAL:                                                                          WNL[  ] sleepy   Neuro: WNL[ z] Normal exam oriented to person/place & time with no focal motor or sensory  deficits. Other                     Eyes: WNL[z ]   Normal exam of conjunctiva & lids, pupils equally reactive. Other     ENT: WNL[ ]    Normal exam of nasal/oral mucosa with absence of cyanosis. Other bad dental    Neck: WNL[x ]  Normal exam of jugular veins, trachea & thyroid. Other  Chest: WNL[ ] Normal lung exam with good air movement absence of wheezes, rales, or rhonchi: Other     SOB when talking, b/l wheeze                                                                             CV:  Auscultation: normal [x ] S3[ ] S4[ ] Irregular [ ] Rub[ ] Clicks[ ]    Murmurs none:[ x]systolic [ ]  diastolic [ ] holosystolic [ ]  Carotids: No Bruits[x ] Other                 Abdominal Aorta: normal [ ] nonpalpable[ ]Other                                                                                      GI:           WNL[x ] Normal exam of abdomen, liver & spleen with no noted masses or tenderness. Other                                                                                                        Extremities: WNLx[ ] Normal no evidence of cyanosis or deformity Edema: none[ ]trace[ ]1+[ ]2+[ ]3+[ ]4+[ ]  Lower Extremity Pulses: Right[2+ ] Left[ 2+]Varicosities[ ]  SKIN :WNL[ ] Normal exam to inspection & palation. Other: large incision on back with scab and erythema                                                           LABS:                        12.3   17.03 )-----------( 398      ( 13 Aug 2021 05:56 )             38.0     08-13    136  |  102  |  18  ----------------------------<  110<H>  4.4   |  31  |  0.64    Ca    9.5      13 Aug 2021 05:56  Phos  4.2     08-13  Mg     2.3     08-13    TPro  6.5  /  Alb  2.4<L>  /  TBili  0.4  /  DBili  x   /  AST  16  /  ALT  38  /  AlkPhos  112  08-13    PT/INR - ( 12 Aug 2021 15:11 )   PT: 15.4 sec;   INR: 1.33 ratio         PTT - ( 12 Aug 2021 15:11 )  PTT:31.4 sec  Urinalysis Basic - ( 12 Aug 2021 16:48 )    Color: Yellow / Appearance: Clear / S.015 / pH: x  Gluc: x / Ketone: Negative  / Bili: Negative / Urobili: Negative   Blood: x / Protein: Negative / Nitrite: Negative   Leuk Esterase: Negative / RBC: 3-5 /HPF / WBC 3-5   Sq Epi: x / Non Sq Epi: Occasional / Bacteria: Occasional      CARDIAC MARKERS ( 12 Aug 2021 18:47 )  <0.015 ng/mL / x     / x     / x     / x      CARDIAC MARKERS ( 12 Aug 2021 15:11 )  <0.015 ng/mL / x     / x     / x     / x              < from: CT Angio Chest PE Protocol w/ IV Cont (21 @ 00:21) >  PROCEDURE:  CT Angiography of the Chest.  Sagittal and coronal reformats wereperformed as well as 3D (MIP) reconstructions.    FINDINGS:    LUNGS AND AIRWAYS: There is marked narrowing of the bilateral mainstem bronchus. The right lower lobe bronchus intermedius is occluded and there is distal impaction of the bronchi. There is no associated atelectasis. Severe emphysema. 3.2 x 2.7 cm right middle lobe mass is stable. 4 mm right lower lobe nodule (2:90), unchanged. Seen right lower lobe opacity has resolved. There are a few scattered calcified subcentimeter granulomas.  PLEURA: No pleural effusion.  MEDIASTINUM AND RAULITO: Right paratracheal lymph node measures 3.3 x 2.9 cm previously 2.8 x 2.4 cm. A subcarinal mass measures approximately 6.1 x 5.0 cm previously 4.8 x 4.7 cm.  VESSELS: There is no pulmonary embolism. Subcarinal mediastinal mass mildly narrows the right pulmonary artery which remains patent.  HEART: Heart size is normal. No pericardial effusion.  CHEST WALL AND LOWER NECK: Previously seen right posterior 3.3 cm mass is no longer present.  VISUALIZED UPPER ABDOMEN: 3.0 cm indeterminant hypodense and is in size. Marked thickening of the left adrenal gland is stable.  BONES: No changes.    IMPRESSION:  No pulmonary embolism.    Enlarging mediastinal masses which narrow the airways are as described above.    Stable right middle lobe mass.    < end of copied text >

## 2021-08-13 NOTE — DIETITIAN NUTRITION RISK NOTIFICATION - ADDITIONAL COMMENTS/DIETITIAN RECOMMENDATIONS
1) add ensure enlive TID with gelatein BID to optimize PO intake   2) daily wt checks to track/trend changes   3) monitor BM; add bowel regimen if > 3 days without BM

## 2021-08-13 NOTE — CONSULT NOTE ADULT - SUBJECTIVE AND OBJECTIVE BOX
CHIEF COMPLAINT: Patient is a 56y old  Male who presents with a chief complaint of AHRF, copd, lung cancer (12 Aug 2021 22:17)      HPI:  55 y/o M homeless with a PMH of smoker ( quit one month ago), polysubstance abuse ( cocaine , marihuana), COPD, depression recently diagnosed with lung cancer came to the ED BIBEMs from [place] due to respiratory distress. Pt reports was diagnosed with lung cancer after an admission on [facility] on 7/7/21 due to suicide attempt. He is s/p right back chest wall mass biopsy on 7/8/21 that showed poorly differentiated carcinoma andEBUS/ bronch by CT surgery was deferred. Pt was recently discharged from [facility] to [place] on 8/6/21.Pt reports since was diagnosed with lung cancer is experiencing progressive SOB. SOB got worse since was d/c from [facility] one week ago. This morning patient wake up with extreme SOB, was gasping for air and EMS called. He endorses associated sweating, chest pain and palpitations during the event. Pt have been experiencing on/off  substernal chest pain and palpitations since was dx with lung cancer. He describes chest pain as pressure like, non radiating and exacerbated with coughing. He endorse cough since approximately one month ago of yellow sputum and tingle blood on it. He states has hemoptysis since 8/2/21  but have been getting better since then. Last episode of hemoptysis was three days ago. He have loss approximately 10-20 pounds since July, 2021.He reports supposed to follow up with hemato-oncology but have not seen  him yet. He has been receiving radiation being the second dose today but missed the appointment due was brought to the hospital. He denies fever, chills, nausea , vomits, leg swelling, orthopnea.     At arrival to ED patient found with /122, ,RR 40 , 96 % pO2 in a NRB. By EMS patient HR was 200  and pO2 90 and labored at ex. At arrival patient placed in NRB with pO2 96% and then transitioned to BiPAP. Chest X ray performed and showed 2cm right lower lung field mass and slightly bibasilar effusion. EKG showed Afib with RVR and premature ventricular complexes. Labs remarkable for WBC 24.56, Hgb 14.8 , Cr 1.19, , D dimer 295, trop neg x 2. Pt was started in a Cardizem drip with conversion of NSR. At ED patient received  Zosyn x once, Vancomycin x once, Duoneb , solumedrol. ICU evaluated patient and recommended to continue with BiPAP and no ICU care needed at this moment.  (12 Aug 2021 22:17)        8/13/21- currently in SR, off of bipap and states he is feeling buch better than before    PMHx: PAST MEDICAL & SURGICAL HISTORY:  Schizophrenia  Bipolar disorder  Depression  Lung cancer  Polysubstance abuse    No significant past surgical history          Soc Hx:  hx of poly substance abuse    homeless      Allergies: Allergies    Allergy Status Unknown    Intolerances    paper plates/tray and plastic utensils only (Unknown)            Vital Signs Last 24 Hrs  T(C): 36.4 (12 Aug 2021 18:30), Max: 36.5 (12 Aug 2021 15:15)  T(F): 97.6 (12 Aug 2021 18:30), Max: 97.7 (12 Aug 2021 15:15)  HR: 100 (13 Aug 2021 06:00) (85 - 144)  BP: 146/97 (13 Aug 2021 06:00) (113/85 - 158/122)  BP(mean): 111 (13 Aug 2021 06:00) (95 - 114)  RR: 17 (13 Aug 2021 06:00) (13 - 40)  SpO2: 98% (13 Aug 2021 06:00) (96% - 100%)    I&O's Summary    12 Aug 2021 07:01  -  13 Aug 2021 07:00  --------------------------------------------------------  IN: 240 mL / OUT: 600 mL / NET: -360 mL            PHYSICAL EXAM:   Constitutional: mildly aggitated  Neck: Soft and supple, No LAD, No JVD  Respiratory:  rhonchi  Cardiovascular: S1 and S2, regular rate and rhythm, no Murmurs, gallops or rubs  Extremities: No peripheral edema  Vascular: 2+ peripheral pulses  Neurological: Alert      MEDICATIONS:  MEDICATIONS  (STANDING):  ALBUTerol    90 MICROgram(s) HFA Inhaler 1 Puff(s) Inhalation every 4 hours  albuterol/ipratropium for Nebulization 3 milliLiter(s) Nebulizer every 6 hours  budesonide  80 MICROgram(s)/formoterol 4.5 MICROgram(s) Inhaler 2 Puff(s) Inhalation two times a day  cefepime  Injectable.      cefepime  Injectable. 1000 milliGRAM(s) IV Push every 12 hours  diltiazem    Tablet 60 milliGRAM(s) Oral every 8 hours  enoxaparin Injectable 40 milliGRAM(s) SubCutaneous daily  methylPREDNISolone sodium succinate Injectable 40 milliGRAM(s) IV Push every 8 hours  risperiDONE   Tablet 1 milliGRAM(s) Oral two times a day  tiotropium 18 MICROgram(s) Capsule 1 Capsule(s) Inhalation daily  traZODone 100 milliGRAM(s) Oral at bedtime  vancomycin  IVPB 1000 milliGRAM(s) IV Intermittent every 12 hours      LABS: All Labs Reviewed:                        12.3   17.03 )-----------( 398      ( 13 Aug 2021 05:56 )             38.0     08-13    136  |  102  |  18  ----------------------------<  110<H>  4.4   |  31  |  0.64    Ca    9.5      13 Aug 2021 05:56  Phos  4.2     08-13  Mg     2.3     08-13    TPro  6.5  /  Alb  2.4<L>  /  TBili  0.4  /  DBili  x   /  AST  16  /  ALT  38  /  AlkPhos  112  08-13    PT/INR - ( 12 Aug 2021 15:11 )   PT: 15.4 sec;   INR: 1.33 ratio         PTT - ( 12 Aug 2021 15:11 )  PTT:31.4 sec  CARDIAC MARKERS ( 12 Aug 2021 18:47 )  <0.015 ng/mL / x     / x     / x     / x      CARDIAC MARKERS ( 12 Aug 2021 15:11 )  <0.015 ng/mL / x     / x     / x     / x              RADIOLOGY:< from: CT Angio Chest PE Protocol w/ IV Cont (08.13.21 @ 00:21) >    IMPRESSION:  No pulmonary embolism.    Enlarging mediastinal masses which narrow the airways are as described above.    Stable right middle lobe mass.        --- End of Report ---            MI NICHOLSON MD; Attending Radiologist  This document has been electronically signed. Aug 13 2021  1:08AM    < end of copied text >      EKG:< from: 12 Lead ECG (08.12.21 @ 14:08) >    Diagnosis Line Atrial fibrillation with rapid ventricular response with premature ventricular or aberrantly conducted complexes  Nonspecific ST and T wave abnormality  Abnormal ECG  No previous ECGs available  Confirmed by BRENDEN IRELAND, ESVIN DIETRICH ([id number]) on 8/12/2021 2:59:53 PM    < end of copied text >      Telemetry: AF---> SR    ECHO: pending

## 2021-08-13 NOTE — CONSULT NOTE ADULT - ASSESSMENT
Pt is a 56y old Male with hx of smoker ( quit one month ago), polysubstance abuse ( cocaine , marihuana), COPD, depression recently diagnosed with lung cancer came to the ED BIBEMs from [place] due to respiratory distress. Pt reports was diagnosed with lung cancer after an admission on [facility] on 7/7/21 due to suicide attempt. He is s/p right back chest wall mass biopsy on 7/8/21 that showed poorly differentiated carcinoma andEBUS/ bronch by CT surgery was deferred. Pt was recently discharged from [facility] to [place] on 8/6/21.Pt reports since was diagnosed with lung cancer is experiencing progressive SOB. SOB got worse since was d/c from [facility] one week ago. This morning patient wake up with extreme SOB, was gasping for air and EMS called. He endorses associated sweating, chest pain and palpitations during the event. Pt have been experiencing on/off  substernal chest pain and palpitations since was dx with lung cancer. He describes chest pain as pressure like, non radiating and exacerbated with coughing. He endorse cough since approximately one month ago of yellow sputum and tingle blood on it. He states has hemoptysis since 8/2/21  but have been getting better since then. Last episode of hemoptysis was three days ago. He have loss approximately 10-20 pounds since July, 2021.He reports supposed to follow up with hemato-oncology but have not seen  him yet. He has been receiving radiation being the second dose today but missed the appointment due was brought to the hospital. He denies fever, chills, nausea , vomits, leg swelling, orthopnea.     1)  Pt is a 56y old Male with hx of smoker ( quit one month ago), polysubstance abuse ( cocaine , marihuana), COPD, depression recently diagnosed with lung cancer came to the ED BIBEMs from [place] due to respiratory distress. Pt reports was diagnosed with lung cancer after an admission on [facility] on 7/7/21 due to suicide attempt. He is s/p right back chest wall mass biopsy on 7/8/21 that showed poorly differentiated carcinoma andEBUS/ bronch by CT surgery was deferred. Pt was recently discharged from [facility] to [place] on 8/6/21.Pt reports since was diagnosed with lung cancer is experiencing progressive SOB. SOB got worse since was d/c from [facility] one week ago. This morning patient wake up with extreme SOB, was gasping for air and EMS called. He endorses associated sweating, chest pain and palpitations during the event. Pt have been experiencing on/off  substernal chest pain and palpitations since was dx with lung cancer. He describes chest pain as pressure like, non radiating and exacerbated with coughing. He endorse cough since approximately one month ago of yellow sputum and tingle blood on it. He states has hemoptysis since 8/2/21  but have been getting better since then. Last episode of hemoptysis was three days ago. He have loss approximately 10-20 pounds since July, 2021.He reports supposed to follow up with hemato-oncology but have not seen  him yet. He has been receiving radiation being the second dose today but missed the appointment due was brought to the hospital. He denies fever, chills, nausea , vomits, leg swelling, orthopnea.     1) Acute hypoxemic respiratory failure in the setting of lung cancer  - COPD exacerbation   - reviewed 8/13 CT:  No evidence of obstructive PNA or PE.  Enlarging mediastinal masses which narrow the airways.  Currently undergoing RT at [facility]   - s/p Solumedrol 125 mg IV once and Duoneb   - s/p Zosyn and Vancomycin in the ED   - pulmonary consult appreciated    - oncology consult appreciated    2) Mediastinal mass   - invading the bronchus  - Transfer to [facility] for tracheal stenting and fulguration of mass  - Cardiothoracic surgery consult appreciated     3) Advance Care Planning   - patient at this appears to have capacity but becomes easily agitated  - Full code at this time   - Unable to schedule GOC meeting due to transfer   - Patient states that he has a brother and sister that he would like to be notified but has not spoken to them in probably 10+ years and are unsure if they would want to hear from him. Patient in agreement that if able to reach them can explain medical course up until this point.  Brother and sister  Was able to contact patients sister who was surprised by phone call as patient and her have not had a relationship in many years, at this time she is requesting that her phone number not be on patients chart, or be contacted at this time. Will respect sisters wishes. Stated that she will speak to her other brother and if any questions will reach out. Family is aware that patients is being transferred to [facility] for procedure

## 2021-08-13 NOTE — CONSULT NOTE ADULT - ASSESSMENT
56 M with hx of polysubstance abuse, nondomiciled presents with AF and shortness of breath/ PNA      Afib-- currently in SR, maintained on Cardizem PO at this time- given current information CHADSVASC 0---- recommend asa at this time    recommend 2 d echo    no evidence of ACS- negative CE x 2     Continue abx as per medicine     will follow as needed  I have personally evaluated and examined the patient. The Attending was available to me as a supervising provider if needed.

## 2021-08-13 NOTE — PROVIDER CONTACT NOTE (OTHER) - SITUATION
Patient does not have a PCP.
Spoke with Denny ENRIQUEZ to inform her of the consult.
Spoke with Didier at service to inform  of consult.
answering service aware of consult.

## 2021-08-13 NOTE — CONSULT NOTE ADULT - SUBJECTIVE AND OBJECTIVE BOX
HPI: Pt is a 56y old Male with hx of       PAIN: ( )Yes   ( )No  Level:  Location:  Intensity:    /10  Quality:  Aggravating Factors:  Alleviating Factors:  Radiation:  Duration/Timing:  Impact on ADLs:    DYSPNEA: ( ) Yes  ( ) No  Level:    PAST MEDICAL & SURGICAL HISTORY:  Schizophrenia    Bipolar disorder    Depression    Lung cancer    Polysubstance abuse    No significant past surgical history        SOCIAL HX:    Hx opiate tolerance ( )YES  ( )NO    Baseline ADLs  (Prior to Admission)  ( ) Independent   ( )Dependent    FAMILY HISTORY:  FH: diabetes mellitus (Mother)    FH: lung cancer  father        Review of Systems:    Anxiety-  Depression-  Physical Discomfort-  Dyspnea-  Constipation-  Diarrhea-  Nausea-  Vomiting-  Anorexia-  Weight Loss-   Cough-  Secretions-  Fatigue-  Weakness-  Delirium-    All other systems reviewed and negative  Unable to obtain/Limited due to:      PHYSICAL EXAM:    Vital Signs Last 24 Hrs  T(C): 36.5 (13 Aug 2021 09:45), Max: 36.5 (12 Aug 2021 15:15)  T(F): 97.7 (13 Aug 2021 09:45), Max: 97.7 (12 Aug 2021 15:15)  HR: 91 (13 Aug 2021 10:00) (85 - 144)  BP: 138/81 (13 Aug 2021 10:00) (113/85 - 158/122)  BP(mean): 98 (13 Aug 2021 10:00) (95 - 114)  RR: 20 (13 Aug 2021 10:00) (13 - 40)  SpO2: 100% (13 Aug 2021 10:00) (92% - 100%)  Daily Height in cm: 182.88 (12 Aug 2021 14:05)    Daily Weight in k.5 (13 Aug 2021 10:00)    PPSV2:   %  FAST:    General:  Mental Status:  HEENT:  Lungs:  Cardiac:  GI:  :  Ext:  Neuro:      LABS:                        12.3   17.03 )-----------( 398      ( 13 Aug 2021 05:56 )             38.0     08-13    136  |  102  |  18  ----------------------------<  110<H>  4.4   |  31  |  0.64    Ca    9.5      13 Aug 2021 05:56  Phos  4.2     08-13  Mg     2.3     08-13    TPro  6.5  /  Alb  2.4<L>  /  TBili  0.4  /  DBili  x   /  AST  16  /  ALT  38  /  AlkPhos  112      PT/INR - ( 12 Aug 2021 15:11 )   PT: 15.4 sec;   INR: 1.33 ratio         PTT - ( 12 Aug 2021 15:11 )  PTT:31.4 sec  Albumin: Albumin, Serum: 2.4 g/dL ( @ 05:56)      Allergies    Allergy Status Unknown    Intolerances    paper plates/tray and plastic utensils only (Unknown)    MEDICATIONS  (STANDING):  ALBUTerol    90 MICROgram(s) HFA Inhaler 1 Puff(s) Inhalation every 4 hours  budesonide  80 MICROgram(s)/formoterol 4.5 MICROgram(s) Inhaler 2 Puff(s) Inhalation two times a day  diltiazem    Tablet 60 milliGRAM(s) Oral every 8 hours  enoxaparin Injectable 40 milliGRAM(s) SubCutaneous daily  methylPREDNISolone sodium succinate Injectable 40 milliGRAM(s) IV Push every 8 hours  tiotropium 18 MICROgram(s) Capsule 1 Capsule(s) Inhalation daily    MEDICATIONS  (PRN):  acetaminophen   Tablet .. 650 milliGRAM(s) Oral every 6 hours PRN Temp greater or equal to 38.5C (101.3F), Mild Pain (1 - 3)  ALPRAZolam 0.5 milliGRAM(s) Oral every 8 hours PRN anxiety  ondansetron Injectable 4 milliGRAM(s) IV Push every 8 hours PRN Nausea and/or Vomiting  oxyCODONE    IR 10 milliGRAM(s) Oral every 6 hours PRN Severe Pain (7 - 10)  oxyCODONE    IR 5 milliGRAM(s) Oral every 6 hours PRN Moderate Pain (4 - 6)      RADIOLOGY/ADDITIONAL STUDIES: HPI: Pt is a 56y old Male with hx of smoker ( quit one month ago), polysubstance abuse ( cocaine , marihuana), COPD, depression recently diagnosed with lung cancer came to the ED BIBEMs from [facility] due to respiratory distress. Pt reports was diagnosed with lung cancer after an admission on [facility] on 21 due to suicide attempt. He is s/p right back chest wall mass biopsy on 21 that showed poorly differentiated carcinoma andEBUS/ bronch by CT surgery was deferred. Pt was recently discharged from [facility] to [facility] on 21.Pt reports since was diagnosed with lung cancer is experiencing progressive SOB. SOB got worse since was d/c from [facility] one week ago. This morning patient wake up with extreme SOB, was gasping for air and EMS called. He endorses associated sweating, chest pain and palpitations during the event. Pt have been experiencing on/off  substernal chest pain and palpitations since was dx with lung cancer. He describes chest pain as pressure like, non radiating and exacerbated with coughing. He endorse cough since approximately one month ago of yellow sputum and tingle blood on it. He states has hemoptysis since 21  but have been getting better since then. Last episode of hemoptysis was three days ago. He have loss approximately 10-20 pounds since .He reports supposed to follow up with hemato-oncology but have not seen  him yet. He has been receiving radiation being the second dose today but missed the appointment due was brought to the hospital. He denies fever, chills, nausea , vomits, leg swelling, orthopnea.     At arrival to ED patient found with /122, ,RR 40 , 96 % pO2 in a NRB. By EMS patient HR was 200  and pO2 90 and labored at ex. At arrival patient placed in NRB with pO2 96% and then transitioned to BiPAP. Chest X ray performed and showed 2cm right lower lung field mass and slightly bibasilar effusion. EKG showed Afib with RVR and premature ventricular complexes. Labs remarkable for WBC 24.56, Hgb 14.8 , Cr 1.19, , D dimer 295, trop neg x 2. Pt was started in a Cardizem drip with conversion of NSR. At ED patient received  Zosyn x once, Vancomycin x once, Duoneb , solumedrol. ICU evaluated patient and recommended to continue with BiPAP and no ICU care needed at this moment.     2021 patient seen and examined with no family at bedside, patient to be transferred to [facility] as per cardiothoracic team.  Patient comfortable at this time, requiring Ventimask. Patient states that he has a brother and sister that he would like to be notified but has not spoken to them in probably 10+ years and are unsure if they would want to hear from him.        PAIN: ( )Yes   ( )No  Level:  Location:  Intensity:    /10  Quality:  Aggravating Factors:  Alleviating Factors:  Radiation:  Duration/Timing:  Impact on ADLs:    DYSPNEA: ( ) Yes  ( ) No  Level:    PAST MEDICAL & SURGICAL HISTORY:  Schizophrenia  Bipolar disorder  Depression  Lung cancer  Polysubstance abuse  No significant past surgical history    SOCIAL HX:    Hx opiate tolerance ( )YES  ( )NO    Baseline ADLs  (Prior to Admission)  (x ) Independent   ( )Dependent    FAMILY HISTORY:  FH: diabetes mellitus (Mother)    FH: lung cancer  father    Review of Systems:    Anxiety-  Depression-  Physical Discomfort-  Dyspnea-  Constipation-  Diarrhea-  Nausea-  Vomiting-  Anorexia-  Weight Loss-   Cough-  Secretions-  Fatigue-  Weakness-  Delirium-    All other systems reviewed and negative  Unable to obtain/Limited due to:      PHYSICAL EXAM:    Vital Signs Last 24 Hrs  T(C): 36.5 (13 Aug 2021 09:45), Max: 36.5 (12 Aug 2021 15:15)  T(F): 97.7 (13 Aug 2021 09:45), Max: 97.7 (12 Aug 2021 15:15)  HR: 91 (13 Aug 2021 10:00) (85 - 144)  BP: 138/81 (13 Aug 2021 10:00) (113/85 - 158/122)  BP(mean): 98 (13 Aug 2021 10:00) (95 - 114)  RR: 20 (13 Aug 2021 10:00) (13 - 40)  SpO2: 100% (13 Aug 2021 10:00) (92% - 100%)  Daily Height in cm: 182.88 (12 Aug 2021 14:05)    Daily Weight in k.5 (13 Aug 2021 10:00)    PPSV2:   %  FAST:    General:  Mental Status:  HEENT:  Lungs:  Cardiac:  GI:  :  Ext:  Neuro:      LABS:                        12.3   17.03 )-----------( 398      ( 13 Aug 2021 05:56 )             38.0         136  |  102  |  18  ----------------------------<  110<H>  4.4   |  31  |  0.64    Ca    9.5      13 Aug 2021 05:56  Phos  4.2       Mg     2.3         TPro  6.5  /  Alb  2.4<L>  /  TBili  0.4  /  DBili  x   /  AST  16  /  ALT  38  /  AlkPhos  112      PT/INR - ( 12 Aug 2021 15:11 )   PT: 15.4 sec;   INR: 1.33 ratio         PTT - ( 12 Aug 2021 15:11 )  PTT:31.4 sec  Albumin: Albumin, Serum: 2.4 g/dL ( @ 05:56)      Allergies    Allergy Status Unknown    Intolerances    paper plates/tray and plastic utensils only (Unknown)    MEDICATIONS  (STANDING):  ALBUTerol    90 MICROgram(s) HFA Inhaler 1 Puff(s) Inhalation every 4 hours  budesonide  80 MICROgram(s)/formoterol 4.5 MICROgram(s) Inhaler 2 Puff(s) Inhalation two times a day  diltiazem    Tablet 60 milliGRAM(s) Oral every 8 hours  enoxaparin Injectable 40 milliGRAM(s) SubCutaneous daily  methylPREDNISolone sodium succinate Injectable 40 milliGRAM(s) IV Push every 8 hours  tiotropium 18 MICROgram(s) Capsule 1 Capsule(s) Inhalation daily    MEDICATIONS  (PRN):  acetaminophen   Tablet .. 650 milliGRAM(s) Oral every 6 hours PRN Temp greater or equal to 38.5C (101.3F), Mild Pain (1 - 3)  ALPRAZolam 0.5 milliGRAM(s) Oral every 8 hours PRN anxiety  ondansetron Injectable 4 milliGRAM(s) IV Push every 8 hours PRN Nausea and/or Vomiting  oxyCODONE    IR 10 milliGRAM(s) Oral every 6 hours PRN Severe Pain (7 - 10)  oxyCODONE    IR 5 milliGRAM(s) Oral every 6 hours PRN Moderate Pain (4 - 6)      RADIOLOGY/ADDITIONAL STUDIES: HPI: Pt is a 56y old Male with hx of smoker ( quit one month ago), polysubstance abuse ( cocaine , marihuana), COPD, depression recently diagnosed with lung cancer came to the ED BIBEMs from [place] due to respiratory distress. Pt reports was diagnosed with lung cancer after an admission on [facility] on 21 due to suicide attempt. He is s/p right back chest wall mass biopsy on 21 that showed poorly differentiated carcinoma andEBUS/ bronch by CT surgery was deferred. Pt was recently discharged from [facility] to [facility] on 21.Pt reports since was diagnosed with lung cancer is experiencing progressive SOB. SOB got worse since was d/c from [facility] one week ago. This morning patient wake up with extreme SOB, was gasping for air and EMS called. He endorses associated sweating, chest pain and palpitations during the event. Pt have been experiencing on/off  substernal chest pain and palpitations since was dx with lung cancer. He describes chest pain as pressure like, non radiating and exacerbated with coughing. He endorse cough since approximately one month ago of yellow sputum and tingle blood on it. He states has hemoptysis since 21  but have been getting better since then. Last episode of hemoptysis was three days ago. He have loss approximately 10-20 pounds since .He reports supposed to follow up with hemato-oncology but have not seen  him yet. He has been receiving radiation being the second dose today but missed the appointment due was brought to the hospital. He denies fever, chills, nausea , vomits, leg swelling, orthopnea.     At arrival to ED patient found with /122, ,RR 40 , 96 % pO2 in a NRB. By EMS patient HR was 200  and pO2 90 and labored at ex. At arrival patient placed in NRB with pO2 96% and then transitioned to BiPAP. Chest X ray performed and showed 2cm right lower lung field mass and slightly bibasilar effusion. EKG showed Afib with RVR and premature ventricular complexes. Labs remarkable for WBC 24.56, Hgb 14.8 , Cr 1.19, , D dimer 295, trop neg x 2. Pt was started in a Cardizem drip with conversion of NSR. At ED patient received  Zosyn x once, Vancomycin x once, Duoneb , solumedrol. ICU evaluated patient and recommended to continue with BiPAP and no ICU care needed at this moment.     2021 patient seen and examined with no family at bedside, patient to be transferred to [facility] as per cardiothoracic team.  Patient comfortable at this time, requiring Ventimask. Patient states that he has a brother and sister that he would like to be notified but has not spoken to them in probably 10+ years and are unsure if they would want to hear from him.  pts Brother name is Karan Rhodes and sister is Marilee Rhodes ( will attempt to contact but patient does not have phone numbers)        PAIN: ( )Yes   ( x)No  states ok at this time     DYSPNEA: (x ) Yes  ( ) No  Level: severe     PAST MEDICAL & SURGICAL HISTORY:  Schizophrenia  Bipolar disorder  Depression  Lung cancer  Polysubstance abuse  No significant past surgical history    SOCIAL HX: homeless, most recently at [facility]     Hx opiate tolerance (x )YES  ( )NO    Baseline ADLs  (Prior to Admission)  (x ) Independent   ( )Dependent    FAMILY HISTORY:  FH: diabetes mellitus (Mother)  FH: lung cancer  father    Review of Systems:    Unable to obtain/Limited due to: "wanted me to stop asking him stupid questions" pt was not willing to participate in ROS       PHYSICAL EXAM:    Vital Signs Last 24 Hrs  T(C): 36.5 (13 Aug 2021 09:45), Max: 36.5 (12 Aug 2021 15:15)  T(F): 97.7 (13 Aug 2021 09:45), Max: 97.7 (12 Aug 2021 15:15)  HR: 91 (13 Aug 2021 10:00) (85 - 144)  BP: 138/81 (13 Aug 2021 10:00) (113/85 - 158/122)  BP(mean): 98 (13 Aug 2021 10:00) (95 - 114)  RR: 20 (13 Aug 2021 10:00) (13 - 40)  SpO2: 100% (13 Aug 2021 10:00) (92% - 100%)  Daily Height in cm: 182.88 (12 Aug 2021 14:05)    Daily Weight in k.5 (13 Aug 2021 10:00)    PPSV2: 30  %    General: ill appearing disheveled gentleman in bed, increased respiratory effort  Mental Status: alert and oriented  HEENT: venti mask inplace  Lungs: + rhonchi   Cardiac: S1S2 +   GI: non-tender,  nondistended, +BS   : +voiding   Ext: moves all extremities   Neuro: alert but agitated     LABS:                        12.3   17.03 )-----------( 398      ( 13 Aug 2021 05:56 )             38.0     08-13    136  |  102  |  18  ----------------------------<  110<H>  4.4   |  31  |  0.64    Ca    9.5      13 Aug 2021 05:56  Phos  4.2     08  Mg     2.3         TPro  6.5  /  Alb  2.4<L>  /  TBili  0.4  /  DBili  x   /  AST  16  /  ALT  38  /  AlkPhos  112  08-13  PT/INR - ( 12 Aug 2021 15:11 )   PT: 15.4 sec;   INR: 1.33 ratio    PTT - ( 12 Aug 2021 15:11 )  PTT:31.4 sec  Albumin: Albumin, Serum: 2.4 g/dL ( @ 05:56)    Allergies    Allergy Status Unknown    Intolerances    paper plates/tray and plastic utensils only (Unknown)    MEDICATIONS  (STANDING):  ALBUTerol    90 MICROgram(s) HFA Inhaler 1 Puff(s) Inhalation every 4 hours  budesonide  80 MICROgram(s)/formoterol 4.5 MICROgram(s) Inhaler 2 Puff(s) Inhalation two times a day  diltiazem    Tablet 60 milliGRAM(s) Oral every 8 hours  enoxaparin Injectable 40 milliGRAM(s) SubCutaneous daily  methylPREDNISolone sodium succinate Injectable 40 milliGRAM(s) IV Push every 8 hours  tiotropium 18 MICROgram(s) Capsule 1 Capsule(s) Inhalation daily    MEDICATIONS  (PRN):  acetaminophen   Tablet .. 650 milliGRAM(s) Oral every 6 hours PRN Temp greater or equal to 38.5C (101.3F), Mild Pain (1 - 3)  ALPRAZolam 0.5 milliGRAM(s) Oral every 8 hours PRN anxiety  ondansetron Injectable 4 milliGRAM(s) IV Push every 8 hours PRN Nausea and/or Vomiting  oxyCODONE    IR 10 milliGRAM(s) Oral every 6 hours PRN Severe Pain (7 - 10)  oxyCODONE    IR 5 milliGRAM(s) Oral every 6 hours PRN Moderate Pain (4 - 6)      RADIOLOGY/ADDITIONAL STUDIES:    EXAM:  CT ANGIO CHEST PULM HAILY DODGE                        PROCEDURE DATE:  2021    INTERPRETATION:  CLINICAL INFORMATION: Cancer. Pneumonia. Respiratory distress. Evaluate for pulmonary embolism  COMPARISON: CT chest 2021.  CONTRAST/COMPLICATIONS:  IV Contrast: Omnipaque 350  90 cc administered   10 cc discarded  Oral Contrast: NONE  Complications: None reported at time of study completion  PROCEDURE:  CT Angiography of the Chest.  Sagittal and coronal reformats wereperformed as well as 3D (MIP) reconstructions.  FINDINGS:  LUNGS AND AIRWAYS: There is marked narrowing of the bilateral mainstem bronchus. The right lower lobe bronchus intermedius is occluded and there is distal impaction of the bronchi. There is no associated atelectasis. Severe emphysema. 3.2 x 2.7 cm right middle lobe mass is stable. 4 mm right lower lobe nodule (2:90), unchanged. Seen right lower lobe opacity has resolved. There are a few scattered calcified subcentimeter granulomas.  PLEURA: No pleural effusion.  MEDIASTINUM AND RAULITO: Right paratracheal lymph node measures 3.3 x 2.9 cm previously 2.8 x 2.4 cm. A subcarinal mass measures approximately 6.1 x 5.0 cm previously 4.8 x 4.7 cm.  VESSELS: There is no pulmonary embolism. Subcarinal mediastinal mass mildly narrows the right pulmonary artery which remains patent.  HEART: Heart size is normal. No pericardial effusion.  CHEST WALL AND LOWER NECK: Previously seen right posterior 3.3 cm mass is no longer present.  VISUALIZED UPPER ABDOMEN: 3.0 cm indeterminant hypodense and is in size. Marked thickening of the left adrenal gland is stable.  BONES: No changes.    IMPRESSION:  No pulmonary embolism.  Enlarging mediastinal masses which narrow the airways are as described above.  Stable right middle lobe mass.      EXAM:  XR CHEST PORTABLE IMMED 1V                        PROCEDURE DATE:  2021    INTERPRETATION:  AP chest on 2021 at 2:27 PM. 2 images. Patient has sepsis.  Heart magnified by technique.  2 cm right lower lung fieldmass is similar to .  The present film shows slight pleural fluid at the lung bases laterally new since prior.    IMPRESSION: Again noted is a 2 cm right lower lung field mass. Presently there are slight basilar effusions.         HPI: Pt is a 56y old Male with hx of smoker ( quit one month ago), polysubstance abuse ( cocaine , marihuana), COPD, depression recently diagnosed with lung cancer came to the ED BIBEMs from [place] due to respiratory distress. Pt reports was diagnosed with lung cancer after an admission on [facility] on 21 due to suicide attempt. He is s/p right back chest wall mass biopsy on 21 that showed poorly differentiated carcinoma andEBUS/ bronch by CT surgery was deferred. Pt was recently discharged from [facility] to [place] on 21.Pt reports since was diagnosed with lung cancer is experiencing progressive SOB. SOB got worse since was d/c from [facility] one week ago. This morning patient wake up with extreme SOB, was gasping for air and EMS called. He endorses associated sweating, chest pain and palpitations during the event. Pt have been experiencing on/off  substernal chest pain and palpitations since was dx with lung cancer. He describes chest pain as pressure like, non radiating and exacerbated with coughing. He endorse cough since approximately one month ago of yellow sputum and tingle blood on it. He states has hemoptysis since 21  but have been getting better since then. Last episode of hemoptysis was three days ago. He have loss approximately 10-20 pounds since .He reports supposed to follow up with hemato-oncology but have not seen  him yet. He has been receiving radiation being the second dose today but missed the appointment due was brought to the hospital. He denies fever, chills, nausea , vomits, leg swelling, orthopnea.     At arrival to ED patient found with /122, ,RR 40 , 96 % pO2 in a NRB. By EMS patient HR was 200  and pO2 90 and labored at ex. At arrival patient placed in NRB with pO2 96% and then transitioned to BiPAP. Chest X ray performed and showed 2cm right lower lung field mass and slightly bibasilar effusion. EKG showed Afib with RVR and premature ventricular complexes. Labs remarkable for WBC 24.56, Hgb 14.8 , Cr 1.19, , D dimer 295, trop neg x 2. Pt was started in a Cardizem drip with conversion of NSR. At ED patient received  Zosyn x once, Vancomycin x once, Duoneb , solumedrol. ICU evaluated patient and recommended to continue with BiPAP and no ICU care needed at this moment.     2021 patient seen and examined with no family at bedside, patient to be transferred to [facility] as per cardiothoracic team.  Patient comfortable at this time, requiring Ventimask. Patient states that he has a brother and sister that he would like to be notified but has not spoken to them in probably 10+ years and are unsure if they would want to hear from him. Patient in agreement that if able to reach them can explain medical course up until this point.  Brother and sister  Was able to contact patients sister who was surprised by phone call as patient and her have not had a relationship in many years, at this time she is requesting that her phone number not be on patients chart, or be contacted at this time. Will respect sisters wishes. Stated that she will speak to her other brother and if any questions will reach out. Family is aware that patients is being transferred to [facility] for procedure     PAIN: ( )Yes   ( x)No  states ok at this time     DYSPNEA: (x ) Yes  ( ) No  Level: severe     PAST MEDICAL & SURGICAL HISTORY:  Schizophrenia  Bipolar disorder  Depression  Lung cancer  Polysubstance abuse  No significant past surgical history    SOCIAL HX: homeless, most recently at [facility]     Hx opiate tolerance (x )YES  ( )NO    Baseline ADLs  (Prior to Admission)  (x ) Independent   ( )Dependent    FAMILY HISTORY:  FH: diabetes mellitus (Mother)  FH: lung cancer  father    Review of Systems:    Unable to obtain/Limited due to: "wanted me to stop asking him stupid questions" pt was not willing to participate in ROS       PHYSICAL EXAM:    Vital Signs Last 24 Hrs  T(C): 36.5 (13 Aug 2021 09:45), Max: 36.5 (12 Aug 2021 15:15)  T(F): 97.7 (13 Aug 2021 09:45), Max: 97.7 (12 Aug 2021 15:15)  HR: 91 (13 Aug 2021 10:00) (85 - 144)  BP: 138/81 (13 Aug 2021 10:00) (113/85 - 158/122)  BP(mean): 98 (13 Aug 2021 10:00) (95 - 114)  RR: 20 (13 Aug 2021 10:00) (13 - 40)  SpO2: 100% (13 Aug 2021 10:00) (92% - 100%)  Daily Height in cm: 182.88 (12 Aug 2021 14:05)    Daily Weight in k.5 (13 Aug 2021 10:00)    PPSV2: 30  %    General: ill appearing disheveled gentleman in bed, increased respiratory effort  Mental Status: alert and oriented  HEENT: venti mask inplace  Lungs: + rhonchi   Cardiac: S1S2 +   GI: non-tender,  nondistended, +BS   : +voiding   Ext: moves all extremities   Neuro: alert but agitated     LABS:                        12.3   17.03 )-----------( 398      ( 13 Aug 2021 05:56 )             38.0     08-13    136  |  102  |  18  ----------------------------<  110<H>  4.4   |  31  |  0.64    Ca    9.5      13 Aug 2021 05:56  Phos  4.2     08-  Mg     2.3     08    TPro  6.5  /  Alb  2.4<L>  /  TBili  0.4  /  DBili  x   /  AST  16  /  ALT  38  /  AlkPhos  112  08-13  PT/INR - ( 12 Aug 2021 15:11 )   PT: 15.4 sec;   INR: 1.33 ratio    PTT - ( 12 Aug 2021 15:11 )  PTT:31.4 sec  Albumin: Albumin, Serum: 2.4 g/dL ( @ 05:56)    Allergies    Allergy Status Unknown    Intolerances    paper plates/tray and plastic utensils only (Unknown)    MEDICATIONS  (STANDING):  ALBUTerol    90 MICROgram(s) HFA Inhaler 1 Puff(s) Inhalation every 4 hours  budesonide  80 MICROgram(s)/formoterol 4.5 MICROgram(s) Inhaler 2 Puff(s) Inhalation two times a day  diltiazem    Tablet 60 milliGRAM(s) Oral every 8 hours  enoxaparin Injectable 40 milliGRAM(s) SubCutaneous daily  methylPREDNISolone sodium succinate Injectable 40 milliGRAM(s) IV Push every 8 hours  tiotropium 18 MICROgram(s) Capsule 1 Capsule(s) Inhalation daily    MEDICATIONS  (PRN):  acetaminophen   Tablet .. 650 milliGRAM(s) Oral every 6 hours PRN Temp greater or equal to 38.5C (101.3F), Mild Pain (1 - 3)  ALPRAZolam 0.5 milliGRAM(s) Oral every 8 hours PRN anxiety  ondansetron Injectable 4 milliGRAM(s) IV Push every 8 hours PRN Nausea and/or Vomiting  oxyCODONE    IR 10 milliGRAM(s) Oral every 6 hours PRN Severe Pain (7 - 10)  oxyCODONE    IR 5 milliGRAM(s) Oral every 6 hours PRN Moderate Pain (4 - 6)      RADIOLOGY/ADDITIONAL STUDIES:    EXAM:  CT ANGIO CHEST PULM ART [facility]                        PROCEDURE DATE:  2021    INTERPRETATION:  CLINICAL INFORMATION: Cancer. Pneumonia. Respiratory distress. Evaluate for pulmonary embolism  COMPARISON: CT chest 2021.  CONTRAST/COMPLICATIONS:  IV Contrast: Omnipaque 350  90 cc administered   10 cc discarded  Oral Contrast: NONE  Complications: None reported at time of study completion  PROCEDURE:  CT Angiography of the Chest.  Sagittal and coronal reformats wereperformed as well as 3D (MIP) reconstructions.  FINDINGS:  LUNGS AND AIRWAYS: There is marked narrowing of the bilateral mainstem bronchus. The right lower lobe bronchus intermedius is occluded and there is distal impaction of the bronchi. There is no associated atelectasis. Severe emphysema. 3.2 x 2.7 cm right middle lobe mass is stable. 4 mm right lower lobe nodule (2:90), unchanged. Seen right lower lobe opacity has resolved. There are a few scattered calcified subcentimeter granulomas.  PLEURA: No pleural effusion.  MEDIASTINUM AND RAULITO: Right paratracheal lymph node measures 3.3 x 2.9 cm previously 2.8 x 2.4 cm. A subcarinal mass measures approximately 6.1 x 5.0 cm previously 4.8 x 4.7 cm.  VESSELS: There is no pulmonary embolism. Subcarinal mediastinal mass mildly narrows the right pulmonary artery which remains patent.  HEART: Heart size is normal. No pericardial effusion.  CHEST WALL AND LOWER NECK: Previously seen right posterior 3.3 cm mass is no longer present.  VISUALIZED UPPER ABDOMEN: 3.0 cm indeterminant hypodense and is in size. Marked thickening of the left adrenal gland is stable.  BONES: No changes.    IMPRESSION:  No pulmonary embolism.  Enlarging mediastinal masses which narrow the airways are as described above.  Stable right middle lobe mass.      EXAM:  XR CHEST PORTABLE IMMED 1V                        PROCEDURE DATE:  2021    INTERPRETATION:  AP chest on 2021 at 2:27 PM. 2 images. Patient has sepsis.  Heart magnified by technique.  2 cm right lower lung fieldmass is similar to .  The present film shows slight pleural fluid at the lung bases laterally new since prior.    IMPRESSION: Again noted is a 2 cm right lower lung field mass. Presently there are slight basilar effusions.

## 2021-08-13 NOTE — DISCHARGE NOTE NURSING/CASE MANAGEMENT/SOCIAL WORK - PATIENT PORTAL LINK FT
You can access the FollowMyHealth Patient Portal offered by [facility] by registering at the following website: [email or web address]. By joining [employer]’s FollowMyHealth portal, you will also be able to view your health information using other applications (apps) compatible with our system.

## 2021-08-13 NOTE — DISCHARGE NOTE PROVIDER - HOSPITAL COURSE
57 y/o M homeless with a PMH of smoker ( quit one month ago), polysubstance abuse ( cocaine , marihuana), COPD, depression recently diagnosed with lung cancer came to the ED BIBEMs from [place] due to respiratory distress. Pt reports was diagnosed with lung cancer after an admission on [facility] on 7/7/21 due to suicide attempt. He is s/p right back chest wall mass biopsy on 7/8/21 that showed poorly differentiated carcinoma andEBUS/ bronch by CT surgery was deferred. Pt was recently discharged from [facility] to [place] on 8/6/21.Pt reports since was diagnosed with lung cancer is experiencing progressive SOB. SOB got worse since was d/c from [facility] one week ago. This morning patient wake up with extreme SOB, was gasping for air and EMS called. He endorses associated sweating, chest pain and palpitations during the event. Pt have been experiencing on/off  substernal chest pain and palpitations since was dx with lung cancer. He describes chest pain as pressure like, non radiating and exacerbated with coughing. He endorse cough since approximately one month ago of yellow sputum and tingle blood on it. He states has hemoptysis since 8/2/21  but have been getting better since then. Last episode of hemoptysis was three days ago. He have loss approximately 10-20 pounds since July, 2021.He reports supposed to follow up with hemato-oncology but have not seen  him yet. He has been receiving radiation being the second dose today but missed the appointment due was brought to the hospital. He denies fever, chills, nausea , vomits, leg swelling, orthopnea.     At arrival to ED patient found with /122, ,RR 40 , 96 % pO2 in a NRB. By EMS patient HR was 200  and pO2 90 and labored at ex. At arrival patient placed in NRB with pO2 96% and then transitioned to BiPAP. Chest X ray performed and showed 2cm right lower lung field mass and slightly bibasilar effusion. EKG showed Afib with RVR and premature ventricular complexes. Labs remarkable for WBC 24.56, Hgb 14.8 , Cr 1.19, , D dimer 295, trop neg x 2. Pt was started in a Cardizem drip with conversion of NSR. At ED patient received  Zosyn x once, Vancomycin x once, Duoneb , solumedrol. ICU evaluated patient and recommended to continue with BiPAP and no ICU care needed at this moment.      There is marked narrowing of the bilateral mainstem bronchus. The right lower lobe bronchus intermedius is occluded and there is distal impaction of the bronchi. There is no associated atelectasis. Severe emphysema. 3.2 x 2.7 cm right middle lobe mass is stable. 4 mm right lower lobe nodule (2:90), unchanged. Seen right lower lobe opacity has resolved.     Pt will be transferred to [facility] for stent  Acute hypoxemic respiratory failure in the setting of lung cancer  - At arrival patient was in a NRB and then transitioned to BiPAP   - ABG: pH 7.32, CO2 -49, HCO3 25, sat 100% in FIO2 30%     Pall team is trying to get in touch with his brother he has not been in touch since 1998     57 y/o M homeless with a PMH of smoker ( quit one month ago), polysubstance abuse ( cocaine , marihuana), COPD, depression recently diagnosed with lung cancer came to the ED BIBEMs from [place] due to respiratory distress. Pt reports was diagnosed with lung cancer after an admission on [facility] on 7/7/21 due to suicide attempt. He is s/p right back chest wall mass biopsy on 7/8/21 that showed poorly differentiated carcinoma andEBUS/ bronch by CT surgery was deferred. Pt was recently discharged from [facility] to [place] on 8/6/21.Pt reports since was diagnosed with lung cancer is experiencing progressive SOB. SOB got worse since was d/c from [facility] one week ago. This morning patient wake up with extreme SOB, was gasping for air and EMS called. He endorses associated sweating, chest pain and palpitations during the event. Pt have been experiencing on/off  substernal chest pain and palpitations since was dx with lung cancer. He describes chest pain as pressure like, non radiating and exacerbated with coughing. He endorse cough since approximately one month ago of yellow sputum and tingle blood on it. He states has hemoptysis since 8/2/21  but have been getting better since then. Last episode of hemoptysis was three days ago. He have loss approximately 10-20 pounds since July, 2021.He reports supposed to follow up with hemato-oncology but have not seen  him yet. He has been receiving radiation being the second dose today but missed the appointment due was brought to the hospital. He denies fever, chills, nausea , vomits, leg swelling, orthopnea.     At arrival to ED patient found with /122, ,RR 40 , 96 % pO2 in a NRB. By EMS patient HR was 200  and pO2 90 and labored at ex. At arrival patient placed in NRB with pO2 96% and then transitioned to BiPAP. Chest X ray performed and showed 2cm right lower lung field mass and slightly bibasilar effusion. EKG showed Afib with RVR and premature ventricular complexes. Labs remarkable for WBC 24.56, Hgb 14.8 , Cr 1.19, , D dimer 295, trop neg x 2. Pt was started in a Cardizem drip with conversion of NSR. At ED patient received  Zosyn x once, Vancomycin x once, Duoneb , solumedrol. ICU evaluated patient and recommended to continue with BiPAP and no ICU care needed at this moment.      There is marked narrowing of the bilateral mainstem bronchus. The right lower lobe bronchus intermedius is occluded and there is distal impaction of the bronchi. There is no associated atelectasis. Severe emphysema. 3.2 x 2.7 cm right middle lobe mass is stable. 4 mm right lower lobe nodule (2:90), unchanged. Seen right lower lobe opacity has resolved.     Pt will be transferred to [facility] for stent  * Acute hypoxemic respiratory failure in the setting of lung cancer  - At arrival patient was in a NRB and then transitioned to BiPAP   - ABG: pH 7.32, CO2 -49, HCO3 25, sat 100% in FIO2 30%     * s/p PAF in NSR now c/w PO short acting Cardizem    Pall team is trying to get in touch with his brother he has not been in touch since 1998

## 2021-08-13 NOTE — DISCHARGE NOTE PROVIDER - NSDCMRMEDTOKEN_GEN_ALL_CORE_FT
albuterol 90 mcg/inh inhalation aerosol: 1 puff(s) inhaled every 4 hours  budesonide-formoterol 160 mcg-4.5 mcg/inh inhalation aerosol: 2 puff(s) inhaled 2 times a day   risperiDONE 1 mg oral tablet: 1 tab(s) orally 2 times a day  tiotropium 18 mcg inhalation capsule: 1 cap(s) inhaled once a day  traZODone 100 mg oral tablet: 1 tab(s) orally once a day (at bedtime)   albuterol 90 mcg/inh inhalation aerosol: 1 puff(s) inhaled every 4 hours  budesonide-formoterol 160 mcg-4.5 mcg/inh inhalation aerosol: 2 puff(s) inhaled 2 times a day   Cardizem 60 mg oral tablet: 1 tab(s) orally 3 times a day  risperiDONE 1 mg oral tablet: 1 tab(s) orally 2 times a day  tiotropium 18 mcg inhalation capsule: 1 cap(s) inhaled once a day  traZODone 100 mg oral tablet: 1 tab(s) orally once a day (at bedtime)

## 2021-08-14 DIAGNOSIS — Z29.9 ENCOUNTER FOR PROPHYLACTIC MEASURES, UNSPECIFIED: ICD-10-CM

## 2021-08-14 DIAGNOSIS — F19.10 OTHER PSYCHOACTIVE SUBSTANCE ABUSE, UNCOMPLICATED: ICD-10-CM

## 2021-08-14 LAB
ABO RH CONFIRMATION: SIGNIFICANT CHANGE UP
ALBUMIN SERPL ELPH-MCNC: 3.4 G/DL — SIGNIFICANT CHANGE UP (ref 3.3–5.2)
ALP SERPL-CCNC: 120 U/L — SIGNIFICANT CHANGE UP (ref 40–120)
ALT FLD-CCNC: 27 U/L — SIGNIFICANT CHANGE UP
ANION GAP SERPL CALC-SCNC: 11 MMOL/L — SIGNIFICANT CHANGE UP (ref 5–17)
AST SERPL-CCNC: 17 U/L — SIGNIFICANT CHANGE UP
BASOPHILS # BLD AUTO: 0.02 K/UL — SIGNIFICANT CHANGE UP (ref 0–0.2)
BASOPHILS NFR BLD AUTO: 0.1 % — SIGNIFICANT CHANGE UP (ref 0–2)
BILIRUB SERPL-MCNC: 0.3 MG/DL — LOW (ref 0.4–2)
BUN SERPL-MCNC: 25.8 MG/DL — HIGH (ref 8–20)
CALCIUM SERPL-MCNC: 10.2 MG/DL — SIGNIFICANT CHANGE UP (ref 8.6–10.2)
CHLORIDE SERPL-SCNC: 98 MMOL/L — SIGNIFICANT CHANGE UP (ref 98–107)
CO2 SERPL-SCNC: 31 MMOL/L — HIGH (ref 22–29)
COVID-19 SPIKE DOMAIN AB INTERP: NEGATIVE — SIGNIFICANT CHANGE UP
COVID-19 SPIKE DOMAIN ANTIBODY RESULT: 0.4 U/ML — SIGNIFICANT CHANGE UP
CREAT SERPL-MCNC: 0.78 MG/DL — SIGNIFICANT CHANGE UP (ref 0.5–1.3)
CULTURE RESULTS: SIGNIFICANT CHANGE UP
EOSINOPHIL # BLD AUTO: 0 K/UL — SIGNIFICANT CHANGE UP (ref 0–0.5)
EOSINOPHIL NFR BLD AUTO: 0 % — SIGNIFICANT CHANGE UP (ref 0–6)
GAS PNL BLDA: SIGNIFICANT CHANGE UP
GLUCOSE SERPL-MCNC: 152 MG/DL — HIGH (ref 70–99)
HCT VFR BLD CALC: 39.1 % — SIGNIFICANT CHANGE UP (ref 39–50)
HGB BLD-MCNC: 12.4 G/DL — LOW (ref 13–17)
IMM GRANULOCYTES NFR BLD AUTO: 0.9 % — SIGNIFICANT CHANGE UP (ref 0–1.5)
LYMPHOCYTES # BLD AUTO: 0.2 K/UL — LOW (ref 1–3.3)
LYMPHOCYTES # BLD AUTO: 1.1 % — LOW (ref 13–44)
MAGNESIUM SERPL-MCNC: 2.3 MG/DL — SIGNIFICANT CHANGE UP (ref 1.6–2.6)
MCHC RBC-ENTMCNC: 29 PG — SIGNIFICANT CHANGE UP (ref 27–34)
MCHC RBC-ENTMCNC: 31.7 GM/DL — LOW (ref 32–36)
MCV RBC AUTO: 91.6 FL — SIGNIFICANT CHANGE UP (ref 80–100)
MONOCYTES # BLD AUTO: 0.48 K/UL — SIGNIFICANT CHANGE UP (ref 0–0.9)
MONOCYTES NFR BLD AUTO: 2.6 % — SIGNIFICANT CHANGE UP (ref 2–14)
NEUTROPHILS # BLD AUTO: 17.75 K/UL — HIGH (ref 1.8–7.4)
NEUTROPHILS NFR BLD AUTO: 95.3 % — HIGH (ref 43–77)
ORGANISM # SPEC MICROSCOPIC CNT: SIGNIFICANT CHANGE UP
ORGANISM # SPEC MICROSCOPIC CNT: SIGNIFICANT CHANGE UP
PLATELET # BLD AUTO: 412 K/UL — HIGH (ref 150–400)
POTASSIUM SERPL-MCNC: 5 MMOL/L — SIGNIFICANT CHANGE UP (ref 3.5–5.3)
POTASSIUM SERPL-SCNC: 5 MMOL/L — SIGNIFICANT CHANGE UP (ref 3.5–5.3)
PROT SERPL-MCNC: 6.4 G/DL — LOW (ref 6.6–8.7)
RBC # BLD: 4.27 M/UL — SIGNIFICANT CHANGE UP (ref 4.2–5.8)
RBC # FLD: 16.5 % — HIGH (ref 10.3–14.5)
RBC CASTS # UR COMP ASSIST: SIGNIFICANT CHANGE UP /HPF (ref 0–4)
SARS-COV-2 IGG+IGM SERPL QL IA: 0.4 U/ML — SIGNIFICANT CHANGE UP
SARS-COV-2 IGG+IGM SERPL QL IA: NEGATIVE — SIGNIFICANT CHANGE UP
SODIUM SERPL-SCNC: 140 MMOL/L — SIGNIFICANT CHANGE UP (ref 135–145)
SPECIMEN SOURCE: SIGNIFICANT CHANGE UP
WBC # BLD: 18.62 K/UL — HIGH (ref 3.8–10.5)
WBC # FLD AUTO: 18.62 K/UL — HIGH (ref 3.8–10.5)
WBC UR QL: NEGATIVE — SIGNIFICANT CHANGE UP

## 2021-08-14 PROCEDURE — 71045 X-RAY EXAM CHEST 1 VIEW: CPT | Mod: 26,77

## 2021-08-14 PROCEDURE — 71045 X-RAY EXAM CHEST 1 VIEW: CPT | Mod: 26,76

## 2021-08-14 PROCEDURE — 36556 INSERT NON-TUNNEL CV CATH: CPT

## 2021-08-14 PROCEDURE — 36620 INSERTION CATHETER ARTERY: CPT

## 2021-08-14 PROCEDURE — 99222 1ST HOSP IP/OBS MODERATE 55: CPT

## 2021-08-14 PROCEDURE — 93010 ELECTROCARDIOGRAM REPORT: CPT

## 2021-08-14 PROCEDURE — 99291 CRITICAL CARE FIRST HOUR: CPT | Mod: 25

## 2021-08-14 RX ORDER — AMIODARONE HYDROCHLORIDE 400 MG/1
150 TABLET ORAL ONCE
Refills: 0 | Status: COMPLETED | OUTPATIENT
Start: 2021-08-14 | End: 2021-08-14

## 2021-08-14 RX ORDER — AMIODARONE HYDROCHLORIDE 400 MG/1
0.5 TABLET ORAL
Qty: 900 | Refills: 0 | Status: DISCONTINUED | OUTPATIENT
Start: 2021-08-14 | End: 2021-08-14

## 2021-08-14 RX ORDER — DEXTROSE 50 % IN WATER 50 %
25 SYRINGE (ML) INTRAVENOUS ONCE
Refills: 0 | Status: DISCONTINUED | OUTPATIENT
Start: 2021-08-14 | End: 2021-08-15

## 2021-08-14 RX ORDER — RISPERIDONE 4 MG/1
1 TABLET ORAL
Refills: 0 | Status: DISCONTINUED | OUTPATIENT
Start: 2021-08-14 | End: 2021-08-16

## 2021-08-14 RX ORDER — BUDESONIDE AND FORMOTEROL FUMARATE DIHYDRATE 160; 4.5 UG/1; UG/1
2 AEROSOL RESPIRATORY (INHALATION)
Refills: 0 | Status: DISCONTINUED | OUTPATIENT
Start: 2021-08-14 | End: 2021-08-16

## 2021-08-14 RX ORDER — SODIUM CHLORIDE 9 MG/ML
1000 INJECTION, SOLUTION INTRAVENOUS
Refills: 0 | Status: DISCONTINUED | OUTPATIENT
Start: 2021-08-14 | End: 2021-08-15

## 2021-08-14 RX ORDER — DEXTROSE 50 % IN WATER 50 %
15 SYRINGE (ML) INTRAVENOUS ONCE
Refills: 0 | Status: DISCONTINUED | OUTPATIENT
Start: 2021-08-14 | End: 2021-08-15

## 2021-08-14 RX ORDER — ENOXAPARIN SODIUM 100 MG/ML
40 INJECTION SUBCUTANEOUS DAILY
Refills: 0 | Status: DISCONTINUED | OUTPATIENT
Start: 2021-08-14 | End: 2021-08-16

## 2021-08-14 RX ORDER — INSULIN LISPRO 100/ML
VIAL (ML) SUBCUTANEOUS EVERY 6 HOURS
Refills: 0 | Status: DISCONTINUED | OUTPATIENT
Start: 2021-08-14 | End: 2021-08-16

## 2021-08-14 RX ORDER — GLUCAGON INJECTION, SOLUTION 0.5 MG/.1ML
1 INJECTION, SOLUTION SUBCUTANEOUS ONCE
Refills: 0 | Status: DISCONTINUED | OUTPATIENT
Start: 2021-08-14 | End: 2021-08-15

## 2021-08-14 RX ORDER — AMIODARONE HYDROCHLORIDE 400 MG/1
0.5 TABLET ORAL
Qty: 900 | Refills: 0 | Status: DISCONTINUED | OUTPATIENT
Start: 2021-08-14 | End: 2021-08-16

## 2021-08-14 RX ORDER — SODIUM CHLORIDE 9 MG/ML
1000 INJECTION, SOLUTION INTRAVENOUS
Refills: 0 | Status: DISCONTINUED | OUTPATIENT
Start: 2021-08-14 | End: 2021-08-16

## 2021-08-14 RX ORDER — DEXTROSE 50 % IN WATER 50 %
12.5 SYRINGE (ML) INTRAVENOUS ONCE
Refills: 0 | Status: DISCONTINUED | OUTPATIENT
Start: 2021-08-14 | End: 2021-08-15

## 2021-08-14 RX ADMIN — Medication 60 MILLIGRAM(S): at 06:13

## 2021-08-14 RX ADMIN — SODIUM CHLORIDE 3 MILLILITER(S): 9 INJECTION INTRAMUSCULAR; INTRAVENOUS; SUBCUTANEOUS at 22:00

## 2021-08-14 RX ADMIN — AMIODARONE HYDROCHLORIDE 33.3 MG/MIN: 400 TABLET ORAL at 13:07

## 2021-08-14 RX ADMIN — ENOXAPARIN SODIUM 40 MILLIGRAM(S): 100 INJECTION SUBCUTANEOUS at 17:09

## 2021-08-14 RX ADMIN — PROPOFOL 15.1 MICROGRAM(S)/KG/MIN: 10 INJECTION, EMULSION INTRAVENOUS at 17:37

## 2021-08-14 RX ADMIN — Medication 3 MILLILITER(S): at 04:09

## 2021-08-14 RX ADMIN — Medication 60 MILLIGRAM(S): at 22:00

## 2021-08-14 RX ADMIN — PROPOFOL 15.1 MICROGRAM(S)/KG/MIN: 10 INJECTION, EMULSION INTRAVENOUS at 09:51

## 2021-08-14 RX ADMIN — AMIODARONE HYDROCHLORIDE 618 MILLIGRAM(S): 400 TABLET ORAL at 11:00

## 2021-08-14 RX ADMIN — SODIUM CHLORIDE 3 MILLILITER(S): 9 INJECTION INTRAMUSCULAR; INTRAVENOUS; SUBCUTANEOUS at 05:42

## 2021-08-14 RX ADMIN — RISPERIDONE 1 MILLIGRAM(S): 4 TABLET ORAL at 23:21

## 2021-08-14 RX ADMIN — Medication 60 MILLIGRAM(S): at 14:13

## 2021-08-14 RX ADMIN — CHLORHEXIDINE GLUCONATE 15 MILLILITER(S): 213 SOLUTION TOPICAL at 06:13

## 2021-08-14 RX ADMIN — SODIUM CHLORIDE 75 MILLILITER(S): 9 INJECTION, SOLUTION INTRAVENOUS at 23:18

## 2021-08-14 RX ADMIN — Medication 3 MILLILITER(S): at 20:16

## 2021-08-14 RX ADMIN — CHLORHEXIDINE GLUCONATE 15 MILLILITER(S): 213 SOLUTION TOPICAL at 17:09

## 2021-08-14 RX ADMIN — PANTOPRAZOLE SODIUM 40 MILLIGRAM(S): 20 TABLET, DELAYED RELEASE ORAL at 11:53

## 2021-08-14 RX ADMIN — Medication 3 MILLILITER(S): at 08:08

## 2021-08-14 RX ADMIN — Medication 3 MILLILITER(S): at 14:08

## 2021-08-14 RX ADMIN — SODIUM CHLORIDE 3 MILLILITER(S): 9 INJECTION INTRAMUSCULAR; INTRAVENOUS; SUBCUTANEOUS at 14:10

## 2021-08-14 RX ADMIN — FENTANYL CITRATE 3.15 MICROGRAM(S)/KG/HR: 50 INJECTION INTRAVENOUS at 11:52

## 2021-08-14 NOTE — PROGRESS NOTE ADULT - ASSESSMENT
57 y/o M homeless with a PMH of smoker ( quit one month ago), polysubstance abuse ( cocaine , marihuana), COPD, depression recently diagnosed with lung cancer came to the ED BIBEMs from [place] due to respiratory distress. 55 y/o M homeless with a PMH of smoker ( quit one month ago), polysubstance abuse ( cocaine , marihuana), COPD, depression recently diagnosed with lung cancer came to the ED BIBEMs from [place] due to respiratory distress.    Pt became unstable, in respiratory distress accompanied with hypertension and tachycardia.  Pt intubated by general anesthesia without event.  A line placed for BP management, Guallpa for strict I/Os in critically ill pt.  Maintaining sedation with propofol and Fentanyl.

## 2021-08-14 NOTE — PROGRESS NOTE ADULT - PROBLEM SELECTOR PLAN 1
Pt required intubation for respiratory distress  Maintain sedation with propofol and fentanyl as appropriate for RASS 0 - -1  Plan for OR, surgical intervention Monday August 16 with Dr. Amaya  Currently hemodynamically stable  Continue Duonebs, Methylprednisone  Consider NGT and TFs if prolonged intubation  Maintain Guallpa in critically ill pt

## 2021-08-14 NOTE — CONSULT NOTE ADULT - ASSESSMENT
- Worsening SOB, respiratory failure, s/p intubation 8/13  - Lung CA - poorly differentiated  - COPD  - Polysubstance abuse (cocaine, THC)    Recommendations:  - For OR on Monday with Dr. Amaya  - C/w steroids, nebs  - Sedation per primary team  - Vent support per primary team  - Consider oncology input while inpatient given pt did not follow-up previously, undomiciled  Thank you for this consult    Shay Barboza M.D.  Pulmonary & Critical Care Medicine  [facility]  Pulmonary and Sleep Medicine at [place]  [address]  [place] [address]  T: [phone]  F: [phone]

## 2021-08-14 NOTE — PROGRESS NOTE ADULT - SUBJECTIVE AND OBJECTIVE BOX
Subjective:  55yo M intubated, sedated, stable.      HPI:  57 y/o M homeless with a PMH of smoker ( quit one month ago), polysubstance abuse ( cocaine , marihuana), COPD, depression recently diagnosed with lung cancer came to the ED BIBEMs from [place] due to respiratory distress. Pt reports was diagnosed with lung cancer after an admission on [facility] on 7/7/21 due to suicide attempt. He is s/p right back chest wall mass biopsy on 7/8/21 that showed poorly differentiated carcinoma and EBUS/ bronch by CT surgery was deferred. Pt was recently discharged from [facility] to [place] on 8/6/21.Pt reports since was diagnosed with lung cancer is experiencing progressive SOB. SOB got worse since was d/c from [facility] one week ago. This morning patient wake up with extreme SOB, was gasping for air and EMS called. He endorses associated sweating, chest pain and palpitations during the event. Pt have been experiencing on/off  substernal chest pain and palpitations since was dx with lung cancer. He describes chest pain as pressure like, non radiating and exacerbated with coughing. He endorse cough since approximately one month ago of yellow sputum and tingle blood on it. He states has hemoptysis since 8/2/21  but have been getting better since then. Last episode of hemoptysis was three days ago. He have loss approximately 10-20 pounds since July, 2021.He reports supposed to follow up with hematology- oncology but have not seen  him yet. He has been receiving radiation being the second dose today but missed the appointment due was but missed the appointment due was brought to the hospital. He denies fever, chills, nausea , vomits, leg swelling, orthopnea. Patient had a CT of chest showing an endobronchial lesion. (13 Aug 2021 15:23)          PAST MEDICAL & SURGICAL HISTORY:  Schizophrenia    Bipolar disorder    Depression    Lung cancer    Polysubstance abuse    No significant past surgical history            MEDICATIONS  (STANDING):  albuterol/ipratropium for Nebulization 3 milliLiter(s) Nebulizer every 6 hours  chlorhexidine 0.12% Liquid 15 milliLiter(s) Oral Mucosa every 12 hours  fentaNYL   Infusion. 0.5 MICROgram(s)/kG/Hr (3.15 mL/Hr) IV Continuous <Continuous>  methylPREDNISolone sodium succinate Injectable 60 milliGRAM(s) IV Push every 8 hours  pantoprazole  Injectable 40 milliGRAM(s) IV Push daily  propofol Infusion 40 MICROgram(s)/kG/Min (15.1 mL/Hr) IV Continuous <Continuous>  sodium chloride 0.9% lock flush 3 milliLiter(s) IV Push every 8 hours    MEDICATIONS  (PRN):          Allergies    Allergy Status Unknown    Intolerances    paper plates/tray and plastic utensils only (Unknown)        WEIGHTS:  Daily Height in cm: 182.88 (13 Aug 2021 15:00)    Daily   Admit Wt: Drug Dosing Weight  Height (cm): 182.9 (13 Aug 2021 15:00)  Weight (kg): 63 (13 Aug 2021 15:00)  BMI (kg/m2): 18.8 (13 Aug 2021 15:00)  BSA (m2): 1.82 (13 Aug 2021 15:00)  I&O's Summary    13 Aug 2021 07:01  -  14 Aug 2021 03:10  --------------------------------------------------------  IN: 375.2 mL / OUT: 955 mL / NET: -579.8 mL        VITAL SIGNS:  ICU Vital Signs Last 24 Hrs  T(C): 36.9 (14 Aug 2021 00:40), Max: 37.2 (13 Aug 2021 21:00)  T(F): 98.4 (14 Aug 2021 00:40), Max: 98.9 (13 Aug 2021 21:00)  HR: 103 (14 Aug 2021 02:00) (90 - 131)  BP: 166/120 (13 Aug 2021 20:00) (131/87 - 166/120)  BP(mean): 138 (13 Aug 2021 20:00) (98 - 138)  ABP: 120/71 (14 Aug 2021 02:00) (109/68 - 165/147)  ABP(mean): 84 (14 Aug 2021 02:00) (78 - 149)  RR: 14 (14 Aug 2021 02:00) (14 - 44)  SpO2: 96% (14 Aug 2021 02:00) (88% - 100%)        All laboratory results, radiology and medications reviewed.    LABS:  08-13    135  |  95<L>  |  17.1  ----------------------------<  118<H>  4.2   |  26.0  |  0.59    Ca    10.1      13 Aug 2021 17:41  Phos  4.2     08-13  Mg     2.3     08-13    TPro  6.7  /  Alb  3.3  /  TBili  0.4  /  DBili  x   /  AST  15  /  ALT  27  /  AlkPhos  122<H>  08-13                                 13.2   20.19 )-----------( 435      ( 13 Aug 2021 17:41 )             39.3          PT/INR - ( 13 Aug 2021 17:41 )   PT: 15.5 sec;   INR: 1.36 ratio         PTT - ( 13 Aug 2021 17:41 )  PTT:30.4 sec  Bilirubin Total, Serum: 0.4 mg/dL (08-13 @ 17:41)  Bilirubin Total, Serum: 0.4 mg/dL (08-13 @ 05:56)    ABG - ( 14 Aug 2021 01:49 )  pH, Arterial: 7.500 pH, Blood: x     /  pCO2: 44    /  pO2: 104   / HCO3: 34    / Base Excess: 11.1  /  SaO2: 99.3              CARDIAC MARKERS ( 12 Aug 2021 18:47 )  <0.015 ng/mL / x     / x     / x     / x      CARDIAC MARKERS ( 12 Aug 2021 15:11 )  <0.015 ng/mL / x     / x     / x     / x          CAPILLARY BLOOD GLUCOSE                 PHYSICAL EXAM:  General:  no acute distress  Neurology:  intubated, sedated, unable to fully assess  Respiratory:  clear to auscultation bilaterally  CV:  regular rate and rhythm, normal S1 S2  Abdomen:  soft, nontender, nondistended, positive bowel sounds  Extremities:  warm, well perfused, no edema +DP pulses

## 2021-08-15 ENCOUNTER — TRANSCRIPTION ENCOUNTER (OUTPATIENT)
Age: 56
End: 2021-08-15

## 2021-08-15 LAB
ANION GAP SERPL CALC-SCNC: 11 MMOL/L — SIGNIFICANT CHANGE UP (ref 5–17)
APTT BLD: 29.2 SEC — SIGNIFICANT CHANGE UP (ref 27.5–35.5)
BUN SERPL-MCNC: 37.5 MG/DL — HIGH (ref 8–20)
CALCIUM SERPL-MCNC: 9.8 MG/DL — SIGNIFICANT CHANGE UP (ref 8.6–10.2)
CHLORIDE SERPL-SCNC: 98 MMOL/L — SIGNIFICANT CHANGE UP (ref 98–107)
CO2 SERPL-SCNC: 29 MMOL/L — SIGNIFICANT CHANGE UP (ref 22–29)
CREAT SERPL-MCNC: 0.96 MG/DL — SIGNIFICANT CHANGE UP (ref 0.5–1.3)
GAS PNL BLDA: SIGNIFICANT CHANGE UP
GLUCOSE BLDC GLUCOMTR-MCNC: 122 MG/DL — HIGH (ref 70–99)
GLUCOSE BLDC GLUCOMTR-MCNC: 150 MG/DL — HIGH (ref 70–99)
GLUCOSE BLDC GLUCOMTR-MCNC: 161 MG/DL — HIGH (ref 70–99)
GLUCOSE BLDC GLUCOMTR-MCNC: 178 MG/DL — HIGH (ref 70–99)
GLUCOSE SERPL-MCNC: 157 MG/DL — HIGH (ref 70–99)
HAV IGM SER-ACNC: SIGNIFICANT CHANGE UP
HBV CORE IGM SER-ACNC: SIGNIFICANT CHANGE UP
HBV SURFACE AG SER-ACNC: SIGNIFICANT CHANGE UP
HCT VFR BLD CALC: 37.1 % — LOW (ref 39–50)
HCV AB S/CO SERPL IA: 0.09 S/CO — SIGNIFICANT CHANGE UP (ref 0–0.99)
HCV AB SERPL-IMP: SIGNIFICANT CHANGE UP
HGB BLD-MCNC: 11.8 G/DL — LOW (ref 13–17)
INR BLD: 1.28 RATIO — HIGH (ref 0.88–1.16)
MAGNESIUM SERPL-MCNC: 2.6 MG/DL — SIGNIFICANT CHANGE UP (ref 1.6–2.6)
MCHC RBC-ENTMCNC: 29.4 PG — SIGNIFICANT CHANGE UP (ref 27–34)
MCHC RBC-ENTMCNC: 31.8 GM/DL — LOW (ref 32–36)
MCV RBC AUTO: 92.5 FL — SIGNIFICANT CHANGE UP (ref 80–100)
PHOSPHATE SERPL-MCNC: 4.6 MG/DL — SIGNIFICANT CHANGE UP (ref 2.4–4.7)
PLATELET # BLD AUTO: 365 K/UL — SIGNIFICANT CHANGE UP (ref 150–400)
POTASSIUM SERPL-MCNC: 4.8 MMOL/L — SIGNIFICANT CHANGE UP (ref 3.5–5.3)
POTASSIUM SERPL-SCNC: 4.8 MMOL/L — SIGNIFICANT CHANGE UP (ref 3.5–5.3)
PROTHROM AB SERPL-ACNC: 14.7 SEC — HIGH (ref 10.6–13.6)
RBC # BLD: 4.01 M/UL — LOW (ref 4.2–5.8)
RBC # FLD: 16.7 % — HIGH (ref 10.3–14.5)
SODIUM SERPL-SCNC: 138 MMOL/L — SIGNIFICANT CHANGE UP (ref 135–145)
WBC # BLD: 14.69 K/UL — HIGH (ref 3.8–10.5)
WBC # FLD AUTO: 14.69 K/UL — HIGH (ref 3.8–10.5)

## 2021-08-15 PROCEDURE — 99232 SBSQ HOSP IP/OBS MODERATE 35: CPT

## 2021-08-15 PROCEDURE — 71045 X-RAY EXAM CHEST 1 VIEW: CPT | Mod: 26

## 2021-08-15 RX ORDER — CEFAZOLIN SODIUM 1 G
2000 VIAL (EA) INJECTION ONCE
Refills: 0 | Status: DISCONTINUED | OUTPATIENT
Start: 2021-08-15 | End: 2021-08-16

## 2021-08-15 RX ADMIN — Medication 1: at 06:52

## 2021-08-15 RX ADMIN — BUDESONIDE AND FORMOTEROL FUMARATE DIHYDRATE 2 PUFF(S): 160; 4.5 AEROSOL RESPIRATORY (INHALATION) at 21:12

## 2021-08-15 RX ADMIN — RISPERIDONE 1 MILLIGRAM(S): 4 TABLET ORAL at 05:14

## 2021-08-15 RX ADMIN — Medication 3 MILLILITER(S): at 21:12

## 2021-08-15 RX ADMIN — CHLORHEXIDINE GLUCONATE 15 MILLILITER(S): 213 SOLUTION TOPICAL at 05:03

## 2021-08-15 RX ADMIN — Medication 60 MILLIGRAM(S): at 22:23

## 2021-08-15 RX ADMIN — PROPOFOL 15.1 MICROGRAM(S)/KG/MIN: 10 INJECTION, EMULSION INTRAVENOUS at 03:30

## 2021-08-15 RX ADMIN — Medication 3 MILLILITER(S): at 08:09

## 2021-08-15 RX ADMIN — SODIUM CHLORIDE 75 MILLILITER(S): 9 INJECTION, SOLUTION INTRAVENOUS at 23:37

## 2021-08-15 RX ADMIN — Medication 60 MILLIGRAM(S): at 05:15

## 2021-08-15 RX ADMIN — BUDESONIDE AND FORMOTEROL FUMARATE DIHYDRATE 2 PUFF(S): 160; 4.5 AEROSOL RESPIRATORY (INHALATION) at 08:09

## 2021-08-15 RX ADMIN — CHLORHEXIDINE GLUCONATE 15 MILLILITER(S): 213 SOLUTION TOPICAL at 17:48

## 2021-08-15 RX ADMIN — PROPOFOL 15.1 MICROGRAM(S)/KG/MIN: 10 INJECTION, EMULSION INTRAVENOUS at 10:18

## 2021-08-15 RX ADMIN — RISPERIDONE 1 MILLIGRAM(S): 4 TABLET ORAL at 17:47

## 2021-08-15 RX ADMIN — PROPOFOL 15.1 MICROGRAM(S)/KG/MIN: 10 INJECTION, EMULSION INTRAVENOUS at 23:37

## 2021-08-15 RX ADMIN — ENOXAPARIN SODIUM 40 MILLIGRAM(S): 100 INJECTION SUBCUTANEOUS at 12:40

## 2021-08-15 RX ADMIN — Medication 1: at 12:40

## 2021-08-15 RX ADMIN — SODIUM CHLORIDE 75 MILLILITER(S): 9 INJECTION, SOLUTION INTRAVENOUS at 13:21

## 2021-08-15 RX ADMIN — FENTANYL CITRATE 3.15 MICROGRAM(S)/KG/HR: 50 INJECTION INTRAVENOUS at 23:37

## 2021-08-15 RX ADMIN — Medication 3 MILLILITER(S): at 03:43

## 2021-08-15 RX ADMIN — Medication 3 MILLILITER(S): at 13:03

## 2021-08-15 RX ADMIN — FENTANYL CITRATE 3.15 MICROGRAM(S)/KG/HR: 50 INJECTION INTRAVENOUS at 07:20

## 2021-08-15 RX ADMIN — SODIUM CHLORIDE 3 MILLILITER(S): 9 INJECTION INTRAMUSCULAR; INTRAVENOUS; SUBCUTANEOUS at 05:03

## 2021-08-15 RX ADMIN — Medication 60 MILLIGRAM(S): at 13:46

## 2021-08-15 RX ADMIN — PANTOPRAZOLE SODIUM 40 MILLIGRAM(S): 20 TABLET, DELAYED RELEASE ORAL at 12:39

## 2021-08-15 RX ADMIN — SODIUM CHLORIDE 3 MILLILITER(S): 9 INJECTION INTRAMUSCULAR; INTRAVENOUS; SUBCUTANEOUS at 13:48

## 2021-08-15 RX ADMIN — SODIUM CHLORIDE 3 MILLILITER(S): 9 INJECTION INTRAMUSCULAR; INTRAVENOUS; SUBCUTANEOUS at 21:17

## 2021-08-15 NOTE — DIETITIAN INITIAL EVALUATION ADULT. - OTHER INFO
57 y/o M homeless with a PMH of smoker ( quit one month ago), polysubstance abuse ( cocaine , marihuana), COPD, depression recently diagnosed with lung cancer came to the ED BIBEMs from [place] due to respiratory distress. Pt reports was diagnosed with lung cancer after an admission on [facility] on 7/7/21 due to suicide attempt. He is s/p right back chest wall mass biopsy on 7/8/21 that showed poorly differentiated carcinoma and EBUS/ bronch by CT surgery was deferred. Pt was recently discharged from [facility] to [place] on 8/6/21.Pt reports since was diagnosed with lung cancer is experiencing progressive SOB. SOB got worse since was d/c from [facility] one week ago. This morning patient wake up with extreme SOB, was gasping for air and EMS called. He endorses associated sweating, chest pain and palpitations during the event. Pt have been experiencing on/off substernal chest pain and palpitations since was dx with lung cancer.

## 2021-08-15 NOTE — PROGRESS NOTE ADULT - SUBJECTIVE AND OBJECTIVE BOX
Subjective:  57yo M intubated, sedated, stable.      HPI:  57 y/o M homeless with a PMH of smoker ( quit one month ago), polysubstance abuse ( cocaine , marihuana), COPD, depression recently diagnosed with lung cancer came to the ED BIBEMs from [place] due to respiratory distress. Pt reports was diagnosed with lung cancer after an admission on [facility] on 21 due to suicide attempt. He is s/p right back chest wall mass biopsy on 21 that showed poorly differentiated carcinoma and EBUS/ bronch by CT surgery was deferred. Pt was recently discharged from [facility] to [place] on 21.Pt reports since was diagnosed with lung cancer is experiencing progressive SOB. SOB got worse since was d/c from [facility] one week ago. This morning patient wake up with extreme SOB, was gasping for air and EMS called. He endorses associated sweating, chest pain and palpitations during the event. Pt have been experiencing on/off  substernal chest pain and palpitations since was dx with lung cancer. He describes chest pain as pressure like, non radiating and exacerbated with coughing. He endorse cough since approximately one month ago of yellow sputum and tingle blood on it. He states has hemoptysis since 21  but have been getting better since then. Last episode of hemoptysis was three days ago. He have loss approximately 10-20 pounds since .He reports supposed to follow up with hematology- oncology but have not seen  him yet. He has been receiving radiation being the second dose today but missed the appointment due was but missed the appointment due was brought to the hospital. He denies fever, chills, nausea , vomits, leg swelling, orthopnea. Patient had a CT of chest showing an endobronchial lesion. (13 Aug 2021 15:23)          PAST MEDICAL & SURGICAL HISTORY:  Schizophrenia    Bipolar disorder    Depression    Lung cancer    Polysubstance abuse    No significant past surgical history            MEDICATIONS  (STANDING):  albuterol/ipratropium for Nebulization 3 milliLiter(s) Nebulizer every 6 hours  aMIOdarone Infusion 0.5 mG/Min (16.7 mL/Hr) IV Continuous <Continuous>  budesonide 160 MICROgram(s)/formoterol 4.5 MICROgram(s) Inhaler 2 Puff(s) Inhalation two times a day  chlorhexidine 0.12% Liquid 15 milliLiter(s) Oral Mucosa every 12 hours  dextrose 40% Gel 15 Gram(s) Oral once  dextrose 5% + sodium chloride 0.9%. 1000 milliLiter(s) (75 mL/Hr) IV Continuous <Continuous>  dextrose 5%. 1000 milliLiter(s) (50 mL/Hr) IV Continuous <Continuous>  dextrose 5%. 1000 milliLiter(s) (100 mL/Hr) IV Continuous <Continuous>  dextrose 50% Injectable 25 Gram(s) IV Push once  dextrose 50% Injectable 12.5 Gram(s) IV Push once  dextrose 50% Injectable 25 Gram(s) IV Push once  enoxaparin Injectable 40 milliGRAM(s) SubCutaneous daily  fentaNYL   Infusion. 0.5 MICROgram(s)/kG/Hr (3.15 mL/Hr) IV Continuous <Continuous>  glucagon  Injectable 1 milliGRAM(s) IntraMuscular once  insulin lispro (ADMELOG) corrective regimen sliding scale   SubCutaneous every 6 hours  methylPREDNISolone sodium succinate Injectable 60 milliGRAM(s) IV Push every 8 hours  pantoprazole  Injectable 40 milliGRAM(s) IV Push daily  propofol Infusion 40 MICROgram(s)/kG/Min (15.1 mL/Hr) IV Continuous <Continuous>  risperiDONE   Tablet 1 milliGRAM(s) Oral two times a day  sodium chloride 0.9% lock flush 3 milliLiter(s) IV Push every 8 hours    MEDICATIONS  (PRN):          Allergies    Allergy Status Unknown    Intolerances    paper plates/tray and plastic utensils only (Unknown)        WEIGHTS:  Daily     Daily Weight in k.4 (14 Aug 2021 05:14)  Admit Wt: Drug Dosing Weight  Height (cm): 182.9 (13 Aug 2021 15:00)  Weight (kg): 63 (13 Aug 2021 15:00)  BMI (kg/m2): 18.8 (13 Aug 2021 15:00)  BSA (m2): 1.82 (13 Aug 2021 15:00)  I&O's Summary    13 Aug 2021 07:01  -  14 Aug 2021 07:00  --------------------------------------------------------  IN: 502.1 mL / OUT: 1240 mL / NET: -737.9 mL    14 Aug 2021 07:01  -  15 Aug 2021 02:50  --------------------------------------------------------  IN: 645.5 mL / OUT: 840 mL / NET: -194.5 mL        VITAL SIGNS:  ICU Vital Signs Last 24 Hrs  T(C): 37.5 (15 Aug 2021 00:00), Max: 37.5 (14 Aug 2021 20:00)  T(F): 99.5 (15 Aug 2021 00:00), Max: 99.5 (14 Aug 2021 20:)  HR: 78 (15 Aug 2021 01:00) (78 - 134)  BP: 93/68 (15 Aug 2021 01:00) (81/54 - 122/75)  BP(mean): 76 (15 Aug 2021 01:00) (62 - 98)  ABP: 97/68 (15 Aug 2021 01:00) (88/60 - 133/72)  ABP(mean): 78 (15 Aug 2021 01:) (69 - 107)  RR: 18 (15 Aug 2021 01:00) (12 - 20)  SpO2: 99% (15 Aug 2021 01:00) (92% - 100%)        All laboratory results, radiology and medications reviewed.    LABS:      140  |  98  |  25.8<H>  ----------------------------<  152<H>  5.0   |  31.0<H>  |  0.78    Ca    10.2      14 Aug 2021 05:23  Phos  4.2     08-13  Mg     2.3         TPro  6.4<L>  /  Alb  3.4  /  TBili  0.3<L>  /  DBili  x   /  AST  17  /  ALT  27  /  AlkPhos  120  08-14                                 12.4   18.62 )-----------( 412      ( 14 Aug 2021 05:23 )             39.1          PT/INR - ( 13 Aug 2021 17:41 )   PT: 15.5 sec;   INR: 1.36 ratio         PTT - ( 13 Aug 2021 17:41 )  PTT:30.4 sec  Bilirubin Total, Serum: 0.3 mg/dL ( @ 05:23)    ABG - ( 14 Aug 2021 23:40 )  pH, Arterial: 7.450 pH, Blood: x     /  pCO2: 47    /  pO2: 147   / HCO3: 33    / Base Excess: 8.7   /  SaO2: 99.8                  CAPILLARY BLOOD GLUCOSE                 PHYSICAL EXAM:  General:  no acute distress  Neurology:  intubated, sedated, unable to fully assess  Respiratory:  course to auscultation bilaterally L>R  CV:  regular rate and rhythm, normal S1 S2  Abdomen:  soft, nondistended, positive bowel sounds  Extremities:  warm, well perfused, no edema +DP pulses

## 2021-08-15 NOTE — DIETITIAN INITIAL EVALUATION ADULT. - ADD RECOMMEND
Monitor as tube feeds are started. Start Glucerna at 40cchr then increase 10cc every 8 hours to target rate of 65cchr

## 2021-08-15 NOTE — PROGRESS NOTE ADULT - PROBLEM SELECTOR PLAN 1
Remains intubated, sedated, stable   Maintain sedation with propofol and fentanyl as appropriate for RASS 0 - -1  Plan for OR, surgical intervention Monday August 16 with Dr. Jose Luis Dossonearnaldo, Methylprednisone  Maintain Guallpa in critically ill pt

## 2021-08-15 NOTE — DIETITIAN INITIAL EVALUATION ADULT. - ORAL INTAKE PTA/DIET HISTORY
Pt made NPO for possible surgery Monday. pt with a endobronchial mass. Pt has lost 10-20# since July. Pt now intubated. Unable to interview.

## 2021-08-15 NOTE — PROGRESS NOTE ADULT - ASSESSMENT
55 y/o M homeless with a PMH of smoker ( quit one month ago), polysubstance abuse ( cocaine , marihuana), COPD, depression recently diagnosed with lung cancer came to the ED BIBEMs from [place] due to respiratory distress.    Pt became unstable, in respiratory distress accompanied with hypertension and tachycardia.  Pt intubated by general anesthesia without event.  A line placed for BP management, Guallpa for strict I/Os in critically ill pt.  Maintaining sedation with propofol and Fentanyl.

## 2021-08-15 NOTE — DIETITIAN INITIAL EVALUATION ADULT. - PERTINENT MEDS FT
08-15 Na138 mmol/L Glu 157 mg/dL<H> K+ 4.8 mmol/L Cr  0.96 mg/dL BUN 37.5 mg/dL<H> Phos 4.6 mg/dL Alb n/a   PAB n/a

## 2021-08-16 ENCOUNTER — APPOINTMENT (OUTPATIENT)
Dept: THORACIC SURGERY | Facility: HOSPITAL | Age: 56
End: 2021-08-16

## 2021-08-16 LAB
ALBUMIN SERPL ELPH-MCNC: 3.1 G/DL — LOW (ref 3.3–5.2)
ALP SERPL-CCNC: 99 U/L — SIGNIFICANT CHANGE UP (ref 40–120)
ALT FLD-CCNC: 15 U/L — SIGNIFICANT CHANGE UP
ANION GAP SERPL CALC-SCNC: 10 MMOL/L — SIGNIFICANT CHANGE UP (ref 5–17)
ANION GAP SERPL CALC-SCNC: 9 MMOL/L — SIGNIFICANT CHANGE UP (ref 5–17)
APTT BLD: 20.5 SEC — LOW (ref 27.5–35.5)
AST SERPL-CCNC: 10 U/L — SIGNIFICANT CHANGE UP
BASOPHILS # BLD AUTO: 0.01 K/UL — SIGNIFICANT CHANGE UP (ref 0–0.2)
BASOPHILS NFR BLD AUTO: 0.1 % — SIGNIFICANT CHANGE UP (ref 0–2)
BILIRUB SERPL-MCNC: <0.2 MG/DL — LOW (ref 0.4–2)
BLD GP AB SCN SERPL QL: SIGNIFICANT CHANGE UP
BUN SERPL-MCNC: 23 MG/DL — HIGH (ref 8–20)
BUN SERPL-MCNC: 26.7 MG/DL — HIGH (ref 8–20)
CALCIUM SERPL-MCNC: 10 MG/DL — SIGNIFICANT CHANGE UP (ref 8.6–10.2)
CALCIUM SERPL-MCNC: 9.2 MG/DL — SIGNIFICANT CHANGE UP (ref 8.6–10.2)
CHLORIDE SERPL-SCNC: 105 MMOL/L — SIGNIFICANT CHANGE UP (ref 98–107)
CHLORIDE SERPL-SCNC: 106 MMOL/L — SIGNIFICANT CHANGE UP (ref 98–107)
CO2 SERPL-SCNC: 28 MMOL/L — SIGNIFICANT CHANGE UP (ref 22–29)
CO2 SERPL-SCNC: 30 MMOL/L — HIGH (ref 22–29)
CREAT SERPL-MCNC: 0.61 MG/DL — SIGNIFICANT CHANGE UP (ref 0.5–1.3)
CREAT SERPL-MCNC: 0.68 MG/DL — SIGNIFICANT CHANGE UP (ref 0.5–1.3)
EOSINOPHIL # BLD AUTO: 0 K/UL — SIGNIFICANT CHANGE UP (ref 0–0.5)
EOSINOPHIL NFR BLD AUTO: 0 % — SIGNIFICANT CHANGE UP (ref 0–6)
GAS PNL BLDA: SIGNIFICANT CHANGE UP
GLUCOSE SERPL-MCNC: 125 MG/DL — HIGH (ref 70–99)
GLUCOSE SERPL-MCNC: 136 MG/DL — HIGH (ref 70–99)
HCT VFR BLD CALC: 34.1 % — LOW (ref 39–50)
HCT VFR BLD CALC: 38.9 % — LOW (ref 39–50)
HGB BLD-MCNC: 10.4 G/DL — LOW (ref 13–17)
HGB BLD-MCNC: 12.2 G/DL — LOW (ref 13–17)
IMM GRANULOCYTES NFR BLD AUTO: 0.7 % — SIGNIFICANT CHANGE UP (ref 0–1.5)
INR BLD: 1.18 RATIO — HIGH (ref 0.88–1.16)
LYMPHOCYTES # BLD AUTO: 0.12 K/UL — LOW (ref 1–3.3)
LYMPHOCYTES # BLD AUTO: 0.8 % — LOW (ref 13–44)
MAGNESIUM SERPL-MCNC: 2.2 MG/DL — SIGNIFICANT CHANGE UP (ref 1.6–2.6)
MAGNESIUM SERPL-MCNC: 2.3 MG/DL — SIGNIFICANT CHANGE UP (ref 1.6–2.6)
MCHC RBC-ENTMCNC: 29.1 PG — SIGNIFICANT CHANGE UP (ref 27–34)
MCHC RBC-ENTMCNC: 29.9 PG — SIGNIFICANT CHANGE UP (ref 27–34)
MCHC RBC-ENTMCNC: 30.5 GM/DL — LOW (ref 32–36)
MCHC RBC-ENTMCNC: 31.4 GM/DL — LOW (ref 32–36)
MCV RBC AUTO: 95.3 FL — SIGNIFICANT CHANGE UP (ref 80–100)
MCV RBC AUTO: 95.3 FL — SIGNIFICANT CHANGE UP (ref 80–100)
MONOCYTES # BLD AUTO: 0.36 K/UL — SIGNIFICANT CHANGE UP (ref 0–0.9)
MONOCYTES NFR BLD AUTO: 2.4 % — SIGNIFICANT CHANGE UP (ref 2–14)
NEUTROPHILS # BLD AUTO: 14.24 K/UL — HIGH (ref 1.8–7.4)
NEUTROPHILS NFR BLD AUTO: 96 % — HIGH (ref 43–77)
PHOSPHATE SERPL-MCNC: 3.6 MG/DL — SIGNIFICANT CHANGE UP (ref 2.4–4.7)
PLATELET # BLD AUTO: 282 K/UL — SIGNIFICANT CHANGE UP (ref 150–400)
PLATELET # BLD AUTO: 286 K/UL — SIGNIFICANT CHANGE UP (ref 150–400)
POTASSIUM SERPL-MCNC: 4.1 MMOL/L — SIGNIFICANT CHANGE UP (ref 3.5–5.3)
POTASSIUM SERPL-MCNC: 4.3 MMOL/L — SIGNIFICANT CHANGE UP (ref 3.5–5.3)
POTASSIUM SERPL-SCNC: 4.1 MMOL/L — SIGNIFICANT CHANGE UP (ref 3.5–5.3)
POTASSIUM SERPL-SCNC: 4.3 MMOL/L — SIGNIFICANT CHANGE UP (ref 3.5–5.3)
PROT SERPL-MCNC: 6 G/DL — LOW (ref 6.6–8.7)
PROTHROM AB SERPL-ACNC: 13.6 SEC — SIGNIFICANT CHANGE UP (ref 10.6–13.6)
RBC # BLD: 3.58 M/UL — LOW (ref 4.2–5.8)
RBC # BLD: 4.08 M/UL — LOW (ref 4.2–5.8)
RBC # FLD: 16.4 % — HIGH (ref 10.3–14.5)
RBC # FLD: 16.5 % — HIGH (ref 10.3–14.5)
SODIUM SERPL-SCNC: 143 MMOL/L — SIGNIFICANT CHANGE UP (ref 135–145)
SODIUM SERPL-SCNC: 145 MMOL/L — SIGNIFICANT CHANGE UP (ref 135–145)
WBC # BLD: 13.11 K/UL — HIGH (ref 3.8–10.5)
WBC # BLD: 14.84 K/UL — HIGH (ref 3.8–10.5)
WBC # FLD AUTO: 13.11 K/UL — HIGH (ref 3.8–10.5)
WBC # FLD AUTO: 14.84 K/UL — HIGH (ref 3.8–10.5)

## 2021-08-16 PROCEDURE — 71045 X-RAY EXAM CHEST 1 VIEW: CPT | Mod: 26,76

## 2021-08-16 PROCEDURE — 99291 CRITICAL CARE FIRST HOUR: CPT

## 2021-08-16 PROCEDURE — 31636 BRONCHOSCOPY BRONCH STENTS: CPT

## 2021-08-16 RX ORDER — PANTOPRAZOLE SODIUM 20 MG/1
40 TABLET, DELAYED RELEASE ORAL DAILY
Refills: 0 | Status: DISCONTINUED | OUTPATIENT
Start: 2021-08-16 | End: 2021-08-18

## 2021-08-16 RX ORDER — VECURONIUM BROMIDE 20 MG/1
10 INJECTION, POWDER, FOR SOLUTION INTRAVENOUS ONCE
Refills: 0 | Status: COMPLETED | OUTPATIENT
Start: 2021-08-16 | End: 2021-08-16

## 2021-08-16 RX ORDER — PHENYLEPHRINE HYDROCHLORIDE 10 MG/ML
0.2 INJECTION INTRAVENOUS
Qty: 40 | Refills: 0 | Status: DISCONTINUED | OUTPATIENT
Start: 2021-08-16 | End: 2021-08-18

## 2021-08-16 RX ORDER — MEPERIDINE HYDROCHLORIDE 50 MG/ML
25 INJECTION INTRAMUSCULAR; INTRAVENOUS; SUBCUTANEOUS ONCE
Refills: 0 | Status: DISCONTINUED | OUTPATIENT
Start: 2021-08-16 | End: 2021-08-18

## 2021-08-16 RX ORDER — RISPERIDONE 4 MG/1
1 TABLET ORAL
Refills: 0 | Status: DISCONTINUED | OUTPATIENT
Start: 2021-08-16 | End: 2021-08-18

## 2021-08-16 RX ORDER — AMIODARONE HYDROCHLORIDE 400 MG/1
400 TABLET ORAL EVERY 8 HOURS
Refills: 0 | Status: DISCONTINUED | OUTPATIENT
Start: 2021-08-16 | End: 2021-08-18

## 2021-08-16 RX ORDER — MIDAZOLAM HYDROCHLORIDE 1 MG/ML
2 INJECTION, SOLUTION INTRAMUSCULAR; INTRAVENOUS ONCE
Refills: 0 | Status: DISCONTINUED | OUTPATIENT
Start: 2021-08-16 | End: 2021-08-17

## 2021-08-16 RX ORDER — ENOXAPARIN SODIUM 100 MG/ML
40 INJECTION SUBCUTANEOUS DAILY
Refills: 0 | Status: DISCONTINUED | OUTPATIENT
Start: 2021-08-17 | End: 2021-08-17

## 2021-08-16 RX ORDER — FENTANYL CITRATE 50 UG/ML
0.5 INJECTION INTRAVENOUS
Qty: 2500 | Refills: 0 | Status: DISCONTINUED | OUTPATIENT
Start: 2021-08-16 | End: 2021-08-18

## 2021-08-16 RX ORDER — SODIUM CHLORIDE 9 MG/ML
500 INJECTION, SOLUTION INTRAVENOUS ONCE
Refills: 0 | Status: COMPLETED | OUTPATIENT
Start: 2021-08-16 | End: 2021-08-16

## 2021-08-16 RX ORDER — VECURONIUM BROMIDE 20 MG/1
10 INJECTION, POWDER, FOR SOLUTION INTRAVENOUS ONCE
Refills: 0 | Status: COMPLETED | OUTPATIENT
Start: 2021-08-16 | End: 2021-08-17

## 2021-08-16 RX ORDER — CHLORHEXIDINE GLUCONATE 213 G/1000ML
1 SOLUTION TOPICAL DAILY
Refills: 0 | Status: DISCONTINUED | OUTPATIENT
Start: 2021-08-16 | End: 2021-08-18

## 2021-08-16 RX ORDER — IPRATROPIUM/ALBUTEROL SULFATE 18-103MCG
3 AEROSOL WITH ADAPTER (GRAM) INHALATION EVERY 6 HOURS
Refills: 0 | Status: DISCONTINUED | OUTPATIENT
Start: 2021-08-16 | End: 2021-08-18

## 2021-08-16 RX ORDER — CHLORHEXIDINE GLUCONATE 213 G/1000ML
5 SOLUTION TOPICAL
Refills: 0 | Status: DISCONTINUED | OUTPATIENT
Start: 2021-08-16 | End: 2021-08-18

## 2021-08-16 RX ORDER — CALCIUM GLUCONATE 100 MG/ML
2 VIAL (ML) INTRAVENOUS ONCE
Refills: 0 | Status: COMPLETED | OUTPATIENT
Start: 2021-08-16 | End: 2021-08-16

## 2021-08-16 RX ORDER — SODIUM CHLORIDE 9 MG/ML
1000 INJECTION, SOLUTION INTRAVENOUS
Refills: 0 | Status: DISCONTINUED | OUTPATIENT
Start: 2021-08-16 | End: 2021-08-18

## 2021-08-16 RX ORDER — BUDESONIDE AND FORMOTEROL FUMARATE DIHYDRATE 160; 4.5 UG/1; UG/1
2 AEROSOL RESPIRATORY (INHALATION)
Refills: 0 | Status: DISCONTINUED | OUTPATIENT
Start: 2021-08-16 | End: 2021-08-18

## 2021-08-16 RX ORDER — POTASSIUM CHLORIDE 20 MEQ
10 PACKET (EA) ORAL
Refills: 0 | Status: DISCONTINUED | OUTPATIENT
Start: 2021-08-16 | End: 2021-08-18

## 2021-08-16 RX ORDER — SODIUM CHLORIDE 9 MG/ML
1000 INJECTION INTRAMUSCULAR; INTRAVENOUS; SUBCUTANEOUS
Refills: 0 | Status: DISCONTINUED | OUTPATIENT
Start: 2021-08-16 | End: 2021-08-18

## 2021-08-16 RX ORDER — AMIODARONE HYDROCHLORIDE 400 MG/1
150 TABLET ORAL ONCE
Refills: 0 | Status: DISCONTINUED | OUTPATIENT
Start: 2021-08-16 | End: 2021-08-16

## 2021-08-16 RX ORDER — PROPOFOL 10 MG/ML
52.91 INJECTION, EMULSION INTRAVENOUS
Qty: 1000 | Refills: 0 | Status: DISCONTINUED | OUTPATIENT
Start: 2021-08-16 | End: 2021-08-18

## 2021-08-16 RX ORDER — AMIODARONE HYDROCHLORIDE 400 MG/1
200 TABLET ORAL DAILY
Refills: 0 | Status: CANCELLED | OUTPATIENT
Start: 2021-08-20 | End: 2021-08-18

## 2021-08-16 RX ORDER — POLYETHYLENE GLYCOL 3350 17 G/17G
17 POWDER, FOR SOLUTION ORAL DAILY
Refills: 0 | Status: DISCONTINUED | OUTPATIENT
Start: 2021-08-16 | End: 2021-08-18

## 2021-08-16 RX ADMIN — PHENYLEPHRINE HYDROCHLORIDE 4.73 MICROGRAM(S)/KG/MIN: 10 INJECTION INTRAVENOUS at 22:19

## 2021-08-16 RX ADMIN — PROPOFOL 15.1 MICROGRAM(S)/KG/MIN: 10 INJECTION, EMULSION INTRAVENOUS at 11:07

## 2021-08-16 RX ADMIN — FENTANYL CITRATE 3.15 MICROGRAM(S)/KG/HR: 50 INJECTION INTRAVENOUS at 16:09

## 2021-08-16 RX ADMIN — SODIUM CHLORIDE 3 MILLILITER(S): 9 INJECTION INTRAMUSCULAR; INTRAVENOUS; SUBCUTANEOUS at 09:04

## 2021-08-16 RX ADMIN — CHLORHEXIDINE GLUCONATE 5 MILLILITER(S): 213 SOLUTION TOPICAL at 17:03

## 2021-08-16 RX ADMIN — VECURONIUM BROMIDE 10 MILLIGRAM(S): 20 INJECTION, POWDER, FOR SOLUTION INTRAVENOUS at 16:15

## 2021-08-16 RX ADMIN — SODIUM CHLORIDE 3 MILLILITER(S): 9 INJECTION INTRAMUSCULAR; INTRAVENOUS; SUBCUTANEOUS at 05:34

## 2021-08-16 RX ADMIN — VECURONIUM BROMIDE 10 MILLIGRAM(S): 20 INJECTION, POWDER, FOR SOLUTION INTRAVENOUS at 04:30

## 2021-08-16 RX ADMIN — BUDESONIDE AND FORMOTEROL FUMARATE DIHYDRATE 2 PUFF(S): 160; 4.5 AEROSOL RESPIRATORY (INHALATION) at 20:42

## 2021-08-16 RX ADMIN — AMIODARONE HYDROCHLORIDE 400 MILLIGRAM(S): 400 TABLET ORAL at 22:18

## 2021-08-16 RX ADMIN — RISPERIDONE 1 MILLIGRAM(S): 4 TABLET ORAL at 17:03

## 2021-08-16 RX ADMIN — SODIUM CHLORIDE 500 MILLILITER(S): 9 INJECTION, SOLUTION INTRAVENOUS at 22:19

## 2021-08-16 RX ADMIN — Medication 60 MILLIGRAM(S): at 06:01

## 2021-08-16 RX ADMIN — SODIUM CHLORIDE 75 MILLILITER(S): 9 INJECTION, SOLUTION INTRAVENOUS at 01:43

## 2021-08-16 RX ADMIN — Medication 60 MILLIGRAM(S): at 22:18

## 2021-08-16 RX ADMIN — Medication 3 MILLILITER(S): at 20:42

## 2021-08-16 RX ADMIN — Medication 3 MILLILITER(S): at 03:29

## 2021-08-16 RX ADMIN — Medication 3 MILLILITER(S): at 08:35

## 2021-08-16 RX ADMIN — PROPOFOL 20 MICROGRAM(S)/KG/MIN: 10 INJECTION, EMULSION INTRAVENOUS at 15:40

## 2021-08-16 RX ADMIN — BUDESONIDE AND FORMOTEROL FUMARATE DIHYDRATE 2 PUFF(S): 160; 4.5 AEROSOL RESPIRATORY (INHALATION) at 08:36

## 2021-08-16 RX ADMIN — PROPOFOL 15.1 MICROGRAM(S)/KG/MIN: 10 INJECTION, EMULSION INTRAVENOUS at 06:01

## 2021-08-16 RX ADMIN — CHLORHEXIDINE GLUCONATE 15 MILLILITER(S): 213 SOLUTION TOPICAL at 06:01

## 2021-08-16 RX ADMIN — PROPOFOL 20 MICROGRAM(S)/KG/MIN: 10 INJECTION, EMULSION INTRAVENOUS at 22:18

## 2021-08-16 RX ADMIN — Medication 60 MILLIGRAM(S): at 12:34

## 2021-08-16 NOTE — PROGRESS NOTE ADULT - ASSESSMENT
57 y/o M homeless with a PMH of smoker ( quit one month ago), polysubstance abuse ( cocaine , marihuana), COPD, depression recently diagnosed with lung cancer came to the ED BIBEMs from [place] due to respiratory distress.    Pt became unstable, in respiratory distress accompanied with hypertension and tachycardia.  Pt intubated by general anesthesia without event.  A line placed for BP management, Guallpa for strict I/Os in critically ill pt.  Maintaining sedation with propofol and Fentanyl.

## 2021-08-16 NOTE — PROGRESS NOTE ADULT - SUBJECTIVE AND OBJECTIVE BOX
Subjective:  no acute events; stable on vent this AM     Vital Signs:  Vital Signs Last 24 Hrs  T(C): 36.1 (08-16-21 @ 11:00), Max: 36.2 (08-15-21 @ 20:00)  T(F): 97 (08-16-21 @ 11:00), Max: 97.2 (08-16-21 @ 04:00)  HR: 97 (08-16-21 @ 11:00) (62 - 102)  BP: 187/114 (08-16-21 @ 04:30) (87/58 - 187/114)  RR: 18 (08-16-21 @ 11:00) (15 - 27)  SpO2: 99% (08-16-21 @ 11:00) (95% - 100%) on (O2)    Telemetry/Alarms:    Relevant labs, radiology and Medications reviewed                        10.4   13.11 )-----------( 282      ( 16 Aug 2021 02:26 )             34.1     08-16    143  |  106  |  26.7<H>  ----------------------------<  136<H>  4.3   |  28.0  |  0.68    Ca    9.2      16 Aug 2021 02:26  Phos  4.6     08-15  Mg     2.3     08-16      PT/INR - ( 15 Aug 2021 03:24 )   PT: 14.7 sec;   INR: 1.28 ratio         PTT - ( 15 Aug 2021 03:24 )  PTT:29.2 sec  MEDICATIONS  (STANDING):  albuterol/ipratropium for Nebulization 3 milliLiter(s) Nebulizer every 6 hours  aMIOdarone Infusion 0.5 mG/Min (16.7 mL/Hr) IV Continuous <Continuous>  budesonide 160 MICROgram(s)/formoterol 4.5 MICROgram(s) Inhaler 2 Puff(s) Inhalation two times a day  ceFAZolin   IVPB 2000 milliGRAM(s) IV Intermittent once  chlorhexidine 0.12% Liquid 15 milliLiter(s) Oral Mucosa every 12 hours  dextrose 5% + sodium chloride 0.9%. 1000 milliLiter(s) (75 mL/Hr) IV Continuous <Continuous>  enoxaparin Injectable 40 milliGRAM(s) SubCutaneous daily  fentaNYL   Infusion. 0.5 MICROgram(s)/kG/Hr (3.15 mL/Hr) IV Continuous <Continuous>  insulin lispro (ADMELOG) corrective regimen sliding scale   SubCutaneous every 6 hours  methylPREDNISolone sodium succinate Injectable 60 milliGRAM(s) IV Push every 8 hours  pantoprazole  Injectable 40 milliGRAM(s) IV Push daily  propofol Infusion 40 MICROgram(s)/kG/Min (15.1 mL/Hr) IV Continuous <Continuous>  risperiDONE   Tablet 1 milliGRAM(s) Oral two times a day  sodium chloride 0.9% lock flush 3 milliLiter(s) IV Push every 8 hours    MEDICATIONS  (PRN):      Physical exam  Gen  intubated and sedated   Neuro  intubated and sedated   Card  rate controlled   Pulm  clear breath sounds   Abd  soft nd/nt   Ext  warm and well perfused         I&O's Summary    15 Aug 2021 07:01  -  16 Aug 2021 07:00  --------------------------------------------------------  IN: 2801.3 mL / OUT: 1950 mL / NET: 851.3 mL    16 Aug 2021 07:01  -  16 Aug 2021 12:26  --------------------------------------------------------  IN: 464 mL / OUT: 150 mL / NET: 314 mL        Assessment  56y Male  w/ PAST MEDICAL & SURGICAL HISTORY:  Schizophrenia    Bipolar disorder    Depression    Lung cancer    Polysubstance abuse    No significant past surgical history    admitted with complaints of Patient is a 56y old  Male who presents with a chief complaint of Endobronchial Mass (16 Aug 2021 05:37)  .  On (Date), patient underwent Insertion, arterial line, radial, left    . Postoperative course/issues:    PLAN    Prior to intubation, Patient stated he had no next to kin to give consent on his behalf.  Patient intubated emergently for airway compromise on Friday night.   Patient has a medical necessity for flex bronch, placement of airway stent, possible fulguration.

## 2021-08-16 NOTE — PROGRESS NOTE ADULT - SUBJECTIVE AND OBJECTIVE BOX
Pt seen and cared for over past 48hrs.  Mediastinal tumor encasing airways and need for mechanical ventilation.  Agree with bronchoscopy and endobroncial stent placement to maintain patent airways.  Pt has no next of kin for consent but would proceed with procedure based on medical necessity on an urgent basis.

## 2021-08-16 NOTE — PROVIDER CONTACT NOTE (CHANGE IN STATUS NOTIFICATION) - SITUATION
pt hx of lung cancer awating surgery today sedated on fent. and prop, became increasingly agitated fighting vent, converted to afib and increasingly hypoxic

## 2021-08-16 NOTE — PROVIDER CONTACT NOTE (CHANGE IN STATUS NOTIFICATION) - RECOMMENDATIONS
10 vecronium given by pa to further sedate patient and help patient increase volume intake. resp. therapy at bedside 100% bagging patient w ambu bag at this time. ABG obtained

## 2021-08-16 NOTE — PROVIDER CONTACT NOTE (CHANGE IN STATUS NOTIFICATION) - BACKGROUND
history of polysubstance abuse, suicidal attempts. came to hospital for worsening SOB, intubated d.t alerted mental status and declining health. scheduled for lung surgery today.

## 2021-08-16 NOTE — PROVIDER CONTACT NOTE (CHANGE IN STATUS NOTIFICATION) - ASSESSMENT
s.p destin placement patient increasingly agitated pulling and tubes and lines. biting et tube, unable to pull volumes. hypertensive, afib and hypoxia. PA at bedside aware

## 2021-08-16 NOTE — PROGRESS NOTE ADULT - SUBJECTIVE AND OBJECTIVE BOX
Significant recent/past 24 hr events:  Overnight agitation despite sedation (Propofol/Fent gtt), bitting ET tube and unable to get appropriate tidal volumes/hypoxemic. Pt sedated and paralyzed.     Subjective:  Review of Systems       Unable to obtain due to: intubated & sedated    Patient is a 56y old  Male who presents with a chief complaint of   HPI:  55 y/o M homeless with a PMH of smoker ( quit one month ago), polysubstance abuse ( cocaine , marihuana), COPD, depression recently diagnosed with lung cancer came to the ED BIBEMs from [place] due to respiratory distress. Pt reports was diagnosed with lung cancer after an admission on [facility] on 7/7/21 due to suicide attempt. He is s/p right back chest wall mass biopsy on 7/8/21 that showed poorly differentiated carcinoma and EBUS/ bronch by CT surgery was deferred. Pt was recently discharged from [facility] to [place] on 8/6/21.Pt reports since was diagnosed with lung cancer is experiencing progressive SOB. SOB got worse since was d/c from [facility] one week ago. This morning patient wake up with extreme SOB, was gasping for air and EMS called. He endorses associated sweating, chest pain and palpitations during the event. Pt have been experiencing on/off  substernal chest pain and palpitations since was dx with lung cancer. He describes chest pain as pressure like, non radiating and exacerbated with coughing. He endorse cough since approximately one month ago of yellow sputum and tingle blood on it. He states has hemoptysis since 8/2/21  but have been getting better since then. Last episode of hemoptysis was three days ago. He have loss approximately 10-20 pounds since July, 2021.He reports supposed to follow up with hematology- oncology but have not seen  him yet. He has been receiving radiation being the second dose today but missed the appointment due was but missed the appointment due was brought to the hospital. He denies fever, chills, nausea , vomits, leg swelling, orthopnea. Patient had a CT of chest showing an endobronchial lesion. (13 Aug 2021 15:23)    PAST MEDICAL & SURGICAL HISTORY:  Schizophrenia    Bipolar disorder    Depression    Lung cancer    Polysubstance abuse    No significant past surgical history      FAMILY HISTORY:  FH: diabetes mellitus (Mother)    FH: lung cancer  father    Vitals   ICU Vital Signs Last 24 Hrs  T(C): 36.1 (16 Aug 2021 05:00), Max: 36.2 (15 Aug 2021 20:00)  T(F): 97 (16 Aug 2021 05:00), Max: 97.2 (16 Aug 2021 04:00)  HR: 98 (16 Aug 2021 05:00) (62 - 98)  BP: 187/114 (16 Aug 2021 04:30) (87/58 - 187/114)  BP(mean): 141 (16 Aug 2021 04:30) (68 - 141)  ABP: 120/69 (16 Aug 2021 05:00) (82/75 - 159/78)  ABP(mean): 84 (16 Aug 2021 05:00) (63 - 105)  RR: 18 (16 Aug 2021 05:00) (9 - 32)  SpO2: 97% (16 Aug 2021 05:00) (94% - 100%)      VENT SETTINGS   Mode: AC/ CMV (Assist Control/ Continuous Mandatory Ventilation)  RR (machine): 18  TV (machine): 550  FiO2: 50  PEEP: 5  MAP: 16  PIP: 36    ABG - ( 16 Aug 2021 05:18 )  pH, Arterial: 7.350 pH, Blood: x     /  pCO2: 63    /  pO2: 59    / HCO3: 35    / Base Excess: 9.2   /  SaO2: 89.4        I&O's Detail    14 Aug 2021 07:01  -  15 Aug 2021 07:00  --------------------------------------------------------  IN:    Amiodarone: 50 mL    Amiodarone: 283.9 mL    dextrose 5% + sodium chloride 0.9%: 600 mL    FentaNYL: 300 mL    Propofol: 254.6 mL  Total IN: 1488.5 mL    OUT:    Indwelling Catheter - Urethral (mL): 1190 mL  Total OUT: 1190 mL    Total NET: 298.5 mL      15 Aug 2021 07:01  -  16 Aug 2021 05:37  --------------------------------------------------------  IN:    Amiodarone: 150.3 mL    dextrose 5% + sodium chloride 0.9%: 1650 mL    FentaNYL: 397.4 mL    Propofol: 371.6 mL  Total IN: 2569.3 mL    OUT:    Indwelling Catheter - Urethral (mL): 1800 mL  Total OUT: 1800 mL    Total NET: 769.3 mL    LABS                        10.4   13.11 )-----------( 282      ( 16 Aug 2021 02:26 )             34.1     08-16    143  |  106  |  26.7<H>  ----------------------------<  136<H>  4.3   |  28.0  |  0.68    Ca    9.2      16 Aug 2021 02:26  Phos  4.6     08-15  Mg     2.3     08-16        PT/INR - ( 15 Aug 2021 03:24 )   PT: 14.7 sec;   INR: 1.28 ratio         PTT - ( 15 Aug 2021 03:24 )  PTT:29.2 sec    POCT Blood Glucose.: 122 mg/dL (08-15-21 @ 23:35)  POCT Blood Glucose.: 150 mg/dL (08-15-21 @ 17:45)  POCT Blood Glucose.: 161 mg/dL (08-15-21 @ 11:54)  POCT Blood Glucose.: 178 mg/dL (08-15-21 @ 06:47)        MEDICATIONS  (STANDING):  albuterol/ipratropium for Nebulization 3 milliLiter(s) Nebulizer every 6 hours  aMIOdarone Infusion 0.5 mG/Min (16.7 mL/Hr) IV Continuous <Continuous>  budesonide 160 MICROgram(s)/formoterol 4.5 MICROgram(s) Inhaler 2 Puff(s) Inhalation two times a day  ceFAZolin   IVPB 2000 milliGRAM(s) IV Intermittent once  chlorhexidine 0.12% Liquid 15 milliLiter(s) Oral Mucosa every 12 hours  dextrose 5% + sodium chloride 0.9%. 1000 milliLiter(s) (75 mL/Hr) IV Continuous <Continuous>  enoxaparin Injectable 40 milliGRAM(s) SubCutaneous daily  fentaNYL   Infusion. 0.5 MICROgram(s)/kG/Hr (3.15 mL/Hr) IV Continuous <Continuous>  insulin lispro (ADMELOG) corrective regimen sliding scale   SubCutaneous every 6 hours  methylPREDNISolone sodium succinate Injectable 60 milliGRAM(s) IV Push every 8 hours  pantoprazole  Injectable 40 milliGRAM(s) IV Push daily  propofol Infusion 40 MICROgram(s)/kG/Min (15.1 mL/Hr) IV Continuous <Continuous>  risperiDONE   Tablet 1 milliGRAM(s) Oral two times a day  sodium chloride 0.9% lock flush 3 milliLiter(s) IV Push every 8 hours  veCURonium  Injectable 10 milliGRAM(s) IV Push once    MEDICATIONS  (PRN):      Allergies:  Allergy Status Unknown  paper plates/tray and plastic utensils only (Unknown)      Physical Exam:   Constitutional: Intubated/sedated  Neck: trachea midline.  ET in place.  Respiratory: +Rhonchi bilaterally to auscultation, no accessory muscle use noted.  Cardiovascular: Afib (Rate controlled), normal S1, S2; no murmurs or rub  Gastrointestinal: Soft, non-tender, non distended, normal bowel sounds  Extremities: No peripheral edema, no cyanosis, no clubbing   Vascular: Equal and normal pulses: 2+ peripheral pulses throughout  Neurological: Intubated/sedated (Propofol/Fent)  Skin: warm, dry, well perfused, no rashes  Lines: Lt IJ TLC, Lt brachial Taylor      Code Status: Full Code       Critical care time spent: 35 minutes (reviewing chart including medication, labs and imaging results, discussions with interdisciplinary team, discussing goals of care/advanced directives, counseling patient and/or family, non-inclusive of procedures)    Case including assessment/plan of care discussed with attending.

## 2021-08-16 NOTE — PROGRESS NOTE ADULT - PROBLEM SELECTOR PLAN 1
Remains intubated, sedated. Requiring escalation of sedation and paralytic in AM 2/2 agitation/bitting ET tube and not receiving TV/hypoxemic   Maintain sedation with propofol and fentanyl as appropriate for RASS 0 - -1  Plan for OR, surgical intervention this AM with Dr. Jose Luis Robb Duonebs, Methylprednisone  Maintain Guallpa in critically ill pt  Cont supportive care at this time and discuss case in AM with Thoracic Team.

## 2021-08-16 NOTE — PROVIDER CONTACT NOTE (CHANGE IN STATUS NOTIFICATION) - ACTION/TREATMENT ORDERED:
monitor results of abg status, xray ordered. keep patient fully sedated and synchronized with ventilator

## 2021-08-17 ENCOUNTER — TRANSCRIPTION ENCOUNTER (OUTPATIENT)
Age: 56
End: 2021-08-17

## 2021-08-17 DIAGNOSIS — I48.91 UNSPECIFIED ATRIAL FIBRILLATION: ICD-10-CM

## 2021-08-17 PROBLEM — F19.10 OTHER PSYCHOACTIVE SUBSTANCE ABUSE, UNCOMPLICATED: Chronic | Status: ACTIVE | Noted: 2021-08-12

## 2021-08-17 PROBLEM — C34.90 MALIGNANT NEOPLASM OF UNSPECIFIED PART OF UNSPECIFIED BRONCHUS OR LUNG: Chronic | Status: ACTIVE | Noted: 2021-08-12

## 2021-08-17 LAB
ANION GAP SERPL CALC-SCNC: 7 MMOL/L — SIGNIFICANT CHANGE UP (ref 5–17)
BASOPHILS # BLD AUTO: 0.01 K/UL — SIGNIFICANT CHANGE UP (ref 0–0.2)
BASOPHILS NFR BLD AUTO: 0.1 % — SIGNIFICANT CHANGE UP (ref 0–2)
BUN SERPL-MCNC: 20 MG/DL — SIGNIFICANT CHANGE UP (ref 8–20)
CALCIUM SERPL-MCNC: 9.8 MG/DL — SIGNIFICANT CHANGE UP (ref 8.6–10.2)
CHLORIDE SERPL-SCNC: 106 MMOL/L — SIGNIFICANT CHANGE UP (ref 98–107)
CO2 SERPL-SCNC: 29 MMOL/L — SIGNIFICANT CHANGE UP (ref 22–29)
CREAT SERPL-MCNC: 0.49 MG/DL — LOW (ref 0.5–1.3)
CULTURE RESULTS: SIGNIFICANT CHANGE UP
EOSINOPHIL # BLD AUTO: 0 K/UL — SIGNIFICANT CHANGE UP (ref 0–0.5)
EOSINOPHIL NFR BLD AUTO: 0 % — SIGNIFICANT CHANGE UP (ref 0–6)
GAS PNL BLDA: SIGNIFICANT CHANGE UP
GAS PNL BLDA: SIGNIFICANT CHANGE UP
GLUCOSE SERPL-MCNC: 123 MG/DL — HIGH (ref 70–99)
HCT VFR BLD CALC: 33.4 % — LOW (ref 39–50)
HGB BLD-MCNC: 10.5 G/DL — LOW (ref 13–17)
IMM GRANULOCYTES NFR BLD AUTO: 0.6 % — SIGNIFICANT CHANGE UP (ref 0–1.5)
LYMPHOCYTES # BLD AUTO: 0.09 K/UL — LOW (ref 1–3.3)
LYMPHOCYTES # BLD AUTO: 0.7 % — LOW (ref 13–44)
MAGNESIUM SERPL-MCNC: 2.1 MG/DL — SIGNIFICANT CHANGE UP (ref 1.6–2.6)
MCHC RBC-ENTMCNC: 29.4 PG — SIGNIFICANT CHANGE UP (ref 27–34)
MCHC RBC-ENTMCNC: 31.4 GM/DL — LOW (ref 32–36)
MCV RBC AUTO: 93.6 FL — SIGNIFICANT CHANGE UP (ref 80–100)
MONOCYTES # BLD AUTO: 0.32 K/UL — SIGNIFICANT CHANGE UP (ref 0–0.9)
MONOCYTES NFR BLD AUTO: 2.5 % — SIGNIFICANT CHANGE UP (ref 2–14)
NEUTROPHILS # BLD AUTO: 12.55 K/UL — HIGH (ref 1.8–7.4)
NEUTROPHILS NFR BLD AUTO: 96.1 % — HIGH (ref 43–77)
PLATELET # BLD AUTO: 289 K/UL — SIGNIFICANT CHANGE UP (ref 150–400)
POTASSIUM SERPL-MCNC: 4.1 MMOL/L — SIGNIFICANT CHANGE UP (ref 3.5–5.3)
POTASSIUM SERPL-SCNC: 4.1 MMOL/L — SIGNIFICANT CHANGE UP (ref 3.5–5.3)
RBC # BLD: 3.57 M/UL — LOW (ref 4.2–5.8)
RBC # FLD: 16.4 % — HIGH (ref 10.3–14.5)
SODIUM SERPL-SCNC: 142 MMOL/L — SIGNIFICANT CHANGE UP (ref 135–145)
SPECIMEN SOURCE: SIGNIFICANT CHANGE UP
WBC # BLD: 13.05 K/UL — HIGH (ref 3.8–10.5)
WBC # FLD AUTO: 13.05 K/UL — HIGH (ref 3.8–10.5)

## 2021-08-17 PROCEDURE — 99291 CRITICAL CARE FIRST HOUR: CPT | Mod: 25

## 2021-08-17 PROCEDURE — 99223 1ST HOSP IP/OBS HIGH 75: CPT

## 2021-08-17 PROCEDURE — 36620 INSERTION CATHETER ARTERY: CPT

## 2021-08-17 PROCEDURE — 71045 X-RAY EXAM CHEST 1 VIEW: CPT | Mod: 26

## 2021-08-17 RX ORDER — ACETAMINOPHEN 500 MG
1000 TABLET ORAL ONCE
Refills: 0 | Status: COMPLETED | OUTPATIENT
Start: 2021-08-17 | End: 2021-08-17

## 2021-08-17 RX ORDER — ALBUMIN HUMAN 25 %
250 VIAL (ML) INTRAVENOUS ONCE
Refills: 0 | Status: COMPLETED | OUTPATIENT
Start: 2021-08-17 | End: 2021-08-17

## 2021-08-17 RX ORDER — MIDAZOLAM HYDROCHLORIDE 1 MG/ML
2 INJECTION, SOLUTION INTRAMUSCULAR; INTRAVENOUS ONCE
Refills: 0 | Status: DISCONTINUED | OUTPATIENT
Start: 2021-08-17 | End: 2021-08-17

## 2021-08-17 RX ORDER — PIPERACILLIN AND TAZOBACTAM 4; .5 G/20ML; G/20ML
3.38 INJECTION, POWDER, LYOPHILIZED, FOR SOLUTION INTRAVENOUS EVERY 8 HOURS
Refills: 0 | Status: DISCONTINUED | OUTPATIENT
Start: 2021-08-17 | End: 2021-08-18

## 2021-08-17 RX ORDER — COLLAGENASE CLOSTRIDIUM HIST. 250 UNIT/G
1 OINTMENT (GRAM) TOPICAL DAILY
Refills: 0 | Status: DISCONTINUED | OUTPATIENT
Start: 2021-08-17 | End: 2021-08-18

## 2021-08-17 RX ORDER — SODIUM CHLORIDE 9 MG/ML
250 INJECTION INTRAMUSCULAR; INTRAVENOUS; SUBCUTANEOUS ONCE
Refills: 0 | Status: COMPLETED | OUTPATIENT
Start: 2021-08-17 | End: 2021-08-17

## 2021-08-17 RX ORDER — PIPERACILLIN AND TAZOBACTAM 4; .5 G/20ML; G/20ML
3.38 INJECTION, POWDER, LYOPHILIZED, FOR SOLUTION INTRAVENOUS ONCE
Refills: 0 | Status: COMPLETED | OUTPATIENT
Start: 2021-08-17 | End: 2021-08-17

## 2021-08-17 RX ADMIN — MIDAZOLAM HYDROCHLORIDE 2 MILLIGRAM(S): 1 INJECTION, SOLUTION INTRAMUSCULAR; INTRAVENOUS at 23:05

## 2021-08-17 RX ADMIN — AMIODARONE HYDROCHLORIDE 400 MILLIGRAM(S): 400 TABLET ORAL at 21:48

## 2021-08-17 RX ADMIN — PIPERACILLIN AND TAZOBACTAM 200 GRAM(S): 4; .5 INJECTION, POWDER, LYOPHILIZED, FOR SOLUTION INTRAVENOUS at 12:20

## 2021-08-17 RX ADMIN — Medication 3 MILLILITER(S): at 21:01

## 2021-08-17 RX ADMIN — AMIODARONE HYDROCHLORIDE 400 MILLIGRAM(S): 400 TABLET ORAL at 06:29

## 2021-08-17 RX ADMIN — Medication 1000 MILLIGRAM(S): at 14:08

## 2021-08-17 RX ADMIN — Medication 200 GRAM(S): at 01:12

## 2021-08-17 RX ADMIN — VECURONIUM BROMIDE 10 MILLIGRAM(S): 20 INJECTION, POWDER, FOR SOLUTION INTRAVENOUS at 01:12

## 2021-08-17 RX ADMIN — Medication 60 MILLIGRAM(S): at 06:29

## 2021-08-17 RX ADMIN — Medication 3 MILLILITER(S): at 04:07

## 2021-08-17 RX ADMIN — PANTOPRAZOLE SODIUM 40 MILLIGRAM(S): 20 TABLET, DELAYED RELEASE ORAL at 14:22

## 2021-08-17 RX ADMIN — MIDAZOLAM HYDROCHLORIDE 2 MILLIGRAM(S): 1 INJECTION, SOLUTION INTRAMUSCULAR; INTRAVENOUS at 06:29

## 2021-08-17 RX ADMIN — BUDESONIDE AND FORMOTEROL FUMARATE DIHYDRATE 2 PUFF(S): 160; 4.5 AEROSOL RESPIRATORY (INHALATION) at 21:01

## 2021-08-17 RX ADMIN — FENTANYL CITRATE 3.15 MICROGRAM(S)/KG/HR: 50 INJECTION INTRAVENOUS at 19:14

## 2021-08-17 RX ADMIN — PROPOFOL 20 MICROGRAM(S)/KG/MIN: 10 INJECTION, EMULSION INTRAVENOUS at 06:30

## 2021-08-17 RX ADMIN — Medication 1 APPLICATION(S): at 17:22

## 2021-08-17 RX ADMIN — CHLORHEXIDINE GLUCONATE 5 MILLILITER(S): 213 SOLUTION TOPICAL at 17:33

## 2021-08-17 RX ADMIN — Medication 125 MILLILITER(S): at 14:22

## 2021-08-17 RX ADMIN — AMIODARONE HYDROCHLORIDE 400 MILLIGRAM(S): 400 TABLET ORAL at 14:22

## 2021-08-17 RX ADMIN — CHLORHEXIDINE GLUCONATE 5 MILLILITER(S): 213 SOLUTION TOPICAL at 06:29

## 2021-08-17 RX ADMIN — RISPERIDONE 1 MILLIGRAM(S): 4 TABLET ORAL at 17:22

## 2021-08-17 RX ADMIN — Medication 3 MILLILITER(S): at 08:58

## 2021-08-17 RX ADMIN — PROPOFOL 20 MICROGRAM(S)/KG/MIN: 10 INJECTION, EMULSION INTRAVENOUS at 08:32

## 2021-08-17 RX ADMIN — FENTANYL CITRATE 3.15 MICROGRAM(S)/KG/HR: 50 INJECTION INTRAVENOUS at 06:30

## 2021-08-17 RX ADMIN — PROPOFOL 20 MICROGRAM(S)/KG/MIN: 10 INJECTION, EMULSION INTRAVENOUS at 14:35

## 2021-08-17 RX ADMIN — Medication 60 MILLIGRAM(S): at 14:23

## 2021-08-17 RX ADMIN — Medication 400 MILLIGRAM(S): at 12:20

## 2021-08-17 RX ADMIN — BUDESONIDE AND FORMOTEROL FUMARATE DIHYDRATE 2 PUFF(S): 160; 4.5 AEROSOL RESPIRATORY (INHALATION) at 08:59

## 2021-08-17 RX ADMIN — Medication 60 MILLIGRAM(S): at 21:49

## 2021-08-17 RX ADMIN — PIPERACILLIN AND TAZOBACTAM 25 GRAM(S): 4; .5 INJECTION, POWDER, LYOPHILIZED, FOR SOLUTION INTRAVENOUS at 21:48

## 2021-08-17 RX ADMIN — Medication 3 MILLILITER(S): at 15:10

## 2021-08-17 RX ADMIN — SODIUM CHLORIDE 1000 MILLILITER(S): 9 INJECTION INTRAMUSCULAR; INTRAVENOUS; SUBCUTANEOUS at 13:26

## 2021-08-17 RX ADMIN — FENTANYL CITRATE 3.15 MICROGRAM(S)/KG/HR: 50 INJECTION INTRAVENOUS at 18:43

## 2021-08-17 RX ADMIN — PROPOFOL 20 MICROGRAM(S)/KG/MIN: 10 INJECTION, EMULSION INTRAVENOUS at 21:48

## 2021-08-17 RX ADMIN — RISPERIDONE 1 MILLIGRAM(S): 4 TABLET ORAL at 06:30

## 2021-08-17 RX ADMIN — CHLORHEXIDINE GLUCONATE 1 APPLICATION(S): 213 SOLUTION TOPICAL at 12:21

## 2021-08-17 NOTE — ADVANCED PRACTICE NURSE CONSULT - RECOMMEDATIONS
·	Cavilon Advanced Ag Q3day to affect area  ·	Consider Santyl (nickel-thick with primary foam - daily) for debridement  ·	Offload patient's back to Left side  ·	Consider Santyl (nickel-thick with primary foam - daily) for debridement  ·	Offload patient's back to Left side

## 2021-08-17 NOTE — PROCEDURE NOTE - NSPROCNAME_GEN_A_CORE
Central Line Insertion
Tracheal Intubation
Arterial Puncture/Cannulation

## 2021-08-17 NOTE — PROGRESS NOTE ADULT - ASSESSMENT
57 y/o M homeless with a PMH of smoker ( quit one month ago), polysubstance abuse ( cocaine , marihuana), COPD, depression recently diagnosed with lung cancer came to the ED BIBEMs from [place] due to respiratory distress.  -Pt became unstable, in respiratory distress accompanied with hypertension and tachycardia.  Pt intubated by general anesthesia without event.  A line placed for BP management, Guallpa for strict I/Os in critically ill pt.  Maintaining sedation with propofol and Fentanyl.  POD#1 Bronchial stenting (Lt)

## 2021-08-17 NOTE — CONSULT NOTE ADULT - ASSESSMENT
55 y/o M homeless with a PMH of smoker ( quit one month ago), polysubstance abuse ( cocaine , marihuana), COPD, depression recently diagnosed with lung cancer came to the ED BIBEMs from [place] due to respiratory distress.  -Pt became unstable, in respiratory distress accompanied with hypertension and tachycardia.  Pt intubated by general anesthesia without event.  A line placed for BP management, Guallpa for strict I/Os in critically ill pt.  Maintaining sedation with propofol and Fentanyl.  POD#1 Bronchial stenting (Lt)    Problem/Plan - 1:  ·  Problem: Endobronchial mass.  Plan: POD#1 Bronchial stenting with Dr Amaya.  Remains intubated, sedated. Requiring escalation of sedation and paralytic in 2/2 agitation/bitting ET tube and not receiving TV/hypoxemic.  Maintain sedation with propofol and fentanyl as appropriate for RASS 0 - -1  Plan for secondary stenting of Rt bronchus on 8/18 with Dr Amaya.   Continue Duonebs, Symbicort, Solumedrol   Maintain Guallpa in critically ill pt, replace lytes PRN  Cont supportive care at this time and discuss case in AM with Thoracic Team.     Upon review of the available data including imaging, pathology and medical records, it is clear that the patient has at least locally advanced malignancy of probable lung origin and with probable distant metastases involving at least the adrenals. Pulmonary and performance status quite poor as is overall prognosis.   Do not feel that continuance of chest radiotherapy at this point off site at [facility] would be prudent from a safety or efficacy  standpoint. Value of repeat bronchial stenting is dubious at best, but if it should result in patient being able to be extubated , would reassess value of continuing chest radiotherapy for at best palliating bronchial obstruction. Ideally would continue radiotherapy at [facility] where he initiated this treatment under the care of Dr. George.  Would strongly consider consulting Palliative care to discuss Goals of Care  and [facility] CPR efforts in a hospitalized patient with extensive metastatic lung cancer with  marginal performance status has a near nil chance of success.    To complete staging work up, would obtain brain MRI.         Problem/Plan - 2:  ·  Problem: Polysubstance abuse.  Plan: Currently sedated, no signs/symptoms of withdrawal.      Problem/Plan - 3:  ·  Problem: Afib.  Plan: Post op Afib  -Currently rate controlled Afib (80-90), maintaining stable HD  Cont Amio PO and monitor for conversion to NSR  Daily EKG  Monitor I/O and Urine output, replace lytes PRN.  If remains in Afib, will consider AC plan. To be discussed in AM with Thoracic team.      Problem/Plan - 4:  ·  Problem: Prophylactic measure.  Plan: Pantoprazole for GI prophylaxis  DVT-SCDs (Holding Lovenox at this time for anticipated Rt bronchial stent on 8/18 per Dr Amaya).

## 2021-08-17 NOTE — PROCEDURE NOTE - NSINDICATIONS_GEN_A_CORE
arterial puncture to obtain ABG's
arterial puncture to obtain ABG's/critical patient/monitoring purposes
respiratory distress/respiratory failure
critical illness/hypertonic/irritant infusion
arterial puncture to obtain ABG's/blood sampling/critical patient/monitoring purposes

## 2021-08-17 NOTE — PROCEDURE NOTE - NSPROCDETAILS_GEN_ALL_CORE
guidewire recovered/lumen(s) aspirated and flushed/sterile dressing applied/sterile technique, catheter placed/ultrasound guidance with use of sterile gel and probe cove
location identified, draped/prepped, sterile technique used, needle inserted/introduced/positive blood return obtained via catheter/connected to a pressurized flush line/sutured in place/hemostasis with direct pressure, dressing applied/Seldinger technique/all materials/supplies accounted for at end of procedure
location identified, draped/prepped, sterile technique used, needle inserted/introduced/positive blood return obtained via catheter/Seldinger technique/all materials/supplies accounted for at end of procedure
location identified, draped/prepped, sterile technique used, needle inserted/introduced/positive blood return obtained via catheter/connected to a pressurized flush line/sutured in place/hemostasis with direct pressure, dressing applied/Seldinger technique/all materials/supplies accounted for at end of procedure

## 2021-08-17 NOTE — PROGRESS NOTE ADULT - PROBLEM SELECTOR PLAN 4
Pantoprazole for GI prophylaxis  SCDs, Lovenox for DVT prophylaxis Pantoprazole for GI prophylaxis  DVT-SCDs (Holding Lovenox at this time for anticipated Rt bronchial stent on 8/18 per Dr Amaya)

## 2021-08-17 NOTE — PRE-ANESTHESIA EVALUATION ADULT - NSANTHPMHFT_GEN_ALL_CORE
55 y/o M homeless with a PMH of smoker ( quit one month ago), polysubstance abuse ( cocaine , marihuana), COPD, depression recently diagnosed with lung cancer came to the ED BIBEMs from [place] due to respiratory distress.  intubated and sedated.

## 2021-08-17 NOTE — ADVANCED PRACTICE NURSE CONSULT - ASSESSMENT
Pt is intubated and sedated requiring totat care.   Pt is intubated and sedated requiring total care.  Current Giorgio of 13. Guallpa catheter in place.  Wound RN and Primary RN at bedside for site assessment.      ·	Right Scapula - Laceration (old) - 3.5cm x 0.4cm) - dry, fibrous yellow tan non-viable tissue, scab formation. Irregular light pink borders with clean dry intact periwound skin.

## 2021-08-17 NOTE — PROGRESS NOTE ADULT - PROBLEM SELECTOR PLAN 1
POD#1 Bronchial stenting with Dr Amaya.  Remains intubated, sedated. Requiring escalation of sedation and paralytic in 2/2 agitation/bitting ET tube and not receiving TV/hypoxemic.  Maintain sedation with propofol and fentanyl as appropriate for RASS 0 - -1  Plan for secondary stenting of Rt bronchus on 8/18 with Dr Amaya.   Continue Duonebs, Symbicort, Solumedrol   Maintain Guallpa in critically ill pt, replace lytes PRN  Cont supportive care at this time and discuss case in AM with Thoracic Team.

## 2021-08-17 NOTE — PROGRESS NOTE ADULT - SUBJECTIVE AND OBJECTIVE BOX
Significant recent/past 24 hr events:  POD#1 bronchial stenting with Dr Amaya.  Overnight pt agitated/bitting ET tube despite highly titrated sedation (Propofol/Fent gtt)  - Pt not receiving adequate TV/hypoxic. Required further PRN sedated and paralyzed with recovery of HD and 02 saturation.    Subjective:  Review of Systems       Unable to obtain due to: intubated & sedated     Patient is a 56y old  Male who presents with a chief complaint of Endobronchial Mass (16 Aug 2021 05:37)    HPI:  55 y/o M homeless with a PMH of smoker ( quit one month ago), polysubstance abuse ( cocaine , marihuana), COPD, depression recently diagnosed with lung cancer came to the ED BIBEMs from [place] due to respiratory distress. Pt reports was diagnosed with lung cancer after an admission on [facility] on 7/7/21 due to suicide attempt. He is s/p right back chest wall mass biopsy on 7/8/21 that showed poorly differentiated carcinoma and EBUS/ bronch by CT surgery was deferred. Pt was recently discharged from [facility] to [place] on 8/6/21.Pt reports since was diagnosed with lung cancer is experiencing progressive SOB. SOB got worse since was d/c from [facility] one week ago. This morning patient wake up with extreme SOB, was gasping for air and EMS called. He endorses associated sweating, chest pain and palpitations during the event. Pt have been experiencing on/off  substernal chest pain and palpitations since was dx with lung cancer. He describes chest pain as pressure like, non radiating and exacerbated with coughing. He endorse cough since approximately one month ago of yellow sputum and tingle blood on it. He states has hemoptysis since 8/2/21  but have been getting better since then. Last episode of hemoptysis was three days ago. He have loss approximately 10-20 pounds since July, 2021.He reports supposed to follow up with hematology- oncology but have not seen  him yet. He has been receiving radiation being the second dose today but missed the appointment due was but missed the appointment due was brought to the hospital. He denies fever, chills, nausea , vomits, leg swelling, orthopnea. Patient had a CT of chest showing an endobronchial lesion. (13 Aug 2021 15:23)    PAST MEDICAL & SURGICAL HISTORY:  Schizophrenia    Bipolar disorder    Depression    Lung cancer    Polysubstance abuse    No significant past surgical history      FAMILY HISTORY:  FH: diabetes mellitus (Mother)    FH: lung cancer  father        Vitals   ICU Vital Signs Last 24 Hrs  T(C): 37.2 (17 Aug 2021 00:00), Max: 37.2 (16 Aug 2021 20:00)  T(F): 99 (17 Aug 2021 00:00), Max: 99 (16 Aug 2021 20:00)  HR: 77 (17 Aug 2021 01:00) (62 - 105)  BP: 102/61 (16 Aug 2021 23:45) (84/50 - 187/114)  BP(mean): 75 (16 Aug 2021 23:45) (61 - 141)  ABP: 113/62 (17 Aug 2021 01:00) (82/75 - 137/76)  ABP(mean): 74 (17 Aug 2021 01:00) (71 - 95)  RR: 16 (17 Aug 2021 01:00) (15 - 27)  SpO2: 98% (17 Aug 2021 01:00) (94% - 100%)      VENT SETTINGS   Mode: AC/ CMV (Assist Control/ Continuous Mandatory Ventilation)  RR (machine): 16  TV (machine): 550  FiO2: 40  PEEP: 5  MAP: 12  PIP: 24    ABG - ( 16 Aug 2021 21:00 )  pH, Arterial: 7.500 pH, Blood: x     /  pCO2: 46    /  pO2: 119   / HCO3: 36    / Base Excess: 12.7  /  SaO2: 99.5        I&O's Detail    15 Aug 2021 07:01  -  16 Aug 2021 07:00  --------------------------------------------------------  IN:    Amiodarone: 150.3 mL    dextrose 5% + sodium chloride 0.9%: 1800 mL    FentaNYL: 441.6 mL    Propofol: 409.4 mL  Total IN: 2801.3 mL    OUT:    Indwelling Catheter - Urethral (mL): 1950 mL  Total OUT: 1950 mL    Total NET: 851.3 mL      16 Aug 2021 07:01  -  17 Aug 2021 02:23  --------------------------------------------------------  IN:    dextrose 5% + sodium chloride 0.9%: 450 mL    dextrose 5% + sodium chloride 0.9%: 750 mL    FentaNYL: 132.6 mL    FentaNYL: 198.9 mL    IV PiggyBack: 100 mL    multiple electrolytes Injection Type 1 Bolus: 500 mL    Phenylephrine: 14.2 mL    Propofol: 113.4 mL    Propofol: 189 mL    sodium chloride 0.9%: 90 mL  Total IN: 2538.1 mL    OUT:    Indwelling Catheter - Urethral (mL): 945 mL  Total OUT: 945 mL    Total NET: 1593.1 mL      LABS                        12.2   14.84 )-----------( 286      ( 16 Aug 2021 16:27 )             38.9     08-16    145  |  105  |  23.0<H>  ----------------------------<  125<H>  4.1   |  30.0<H>  |  0.61    Ca    10.0      16 Aug 2021 16:27  Phos  3.6     08-16  Mg     2.2     08-16    TPro  6.0<L>  /  Alb  3.1<L>  /  TBili  <0.2<L>  /  DBili  x   /  AST  10  /  ALT  15  /  AlkPhos  99  08-16    LIVER FUNCTIONS - ( 16 Aug 2021 16:27 )  Alb: 3.1 g/dL / Pro: 6.0 g/dL / ALK PHOS: 99 U/L / ALT: 15 U/L / AST: 10 U/L / GGT: x           PT/INR - ( 16 Aug 2021 16:27 )   PT: 13.6 sec;   INR: 1.18 ratio         PTT - ( 16 Aug 2021 16:27 )  PTT:20.5 sec      MEDICATIONS  (STANDING):  albuterol/ipratropium for Nebulization 3 milliLiter(s) Nebulizer every 6 hours  aMIOdarone    Tablet 400 milliGRAM(s) Oral every 8 hours  budesonide 160 MICROgram(s)/formoterol 4.5 MICROgram(s) Inhaler 2 Puff(s) Inhalation two times a day  chlorhexidine 0.12% Liquid 5 milliLiter(s) Oral Mucosa two times a day  chlorhexidine 2% Cloths 1 Application(s) Topical daily  dextrose 5% + sodium chloride 0.9%. 1000 milliLiter(s) (75 mL/Hr) IV Continuous <Continuous>  enoxaparin Injectable 40 milliGRAM(s) SubCutaneous daily  fentaNYL   Infusion 0.5 MICROgram(s)/kG/Hr (3.15 mL/Hr) IV Continuous <Continuous>  meperidine     Injectable 25 milliGRAM(s) IV Push once  methylPREDNISolone sodium succinate Injectable 60 milliGRAM(s) IV Push every 8 hours  midazolam Injectable 2 milliGRAM(s) IV Push once  pantoprazole  Injectable 40 milliGRAM(s) IV Push daily  phenylephrine    Infusion 0.2 MICROgram(s)/kG/Min (4.73 mL/Hr) IV Continuous <Continuous>  polyethylene glycol 3350 17 Gram(s) Oral daily  potassium chloride  10 mEq/50 mL IVPB 10 milliEquivalent(s) IV Intermittent every 1 hour  potassium chloride  10 mEq/50 mL IVPB 10 milliEquivalent(s) IV Intermittent every 1 hour  potassium chloride  10 mEq/50 mL IVPB 10 milliEquivalent(s) IV Intermittent every 1 hour  propofol Infusion 52.91 MICROgram(s)/kG/Min (20 mL/Hr) IV Continuous <Continuous>  risperiDONE   Tablet 1 milliGRAM(s) Oral two times a day  sodium chloride 0.9%. 1000 milliLiter(s) (10 mL/Hr) IV Continuous <Continuous>  sodium chloride 0.9%. 1000 milliLiter(s) (10 mL/Hr) IV Continuous <Continuous>    MEDICATIONS  (PRN):      Allergies:  Allergy Status Unknown  paper plates/tray and plastic utensils only (Unknown)      Physical Exam:   Constitutional: Intubated/sedated  Neck: trachea midline.  ET in place.  Respiratory: +Rhonchi bilaterally to auscultation, no accessory muscle use noted.  Cardiovascular: Afib (Rate controlled), normal S1, S2; no murmurs or rub  Gastrointestinal: Soft, non-tender, non distended, normal bowel sounds  Extremities: No peripheral edema, no cyanosis, no clubbing   Vascular: Equal and normal pulses: 2+ peripheral pulses throughout  Neurological: Intubated/sedated (Propofol/Fent)  Skin: warm, dry, well perfused, no rashes  Lines: Lt IJ TLC, Rt femoral [place]      Code Status: Full Code       Critical care time spent: 35 minutes (reviewing chart including medication, labs and imaging results, discussions with interdisciplinary team, discussing goals of care/advanced directives, counseling patient and/or family, non-inclusive of procedures)    Case including assessment/plan of care discussed with attending.

## 2021-08-18 ENCOUNTER — APPOINTMENT (OUTPATIENT)
Dept: THORACIC SURGERY | Facility: HOSPITAL | Age: 56
End: 2021-08-18

## 2021-08-18 ENCOUNTER — APPOINTMENT (OUTPATIENT)
Dept: THORACIC SURGERY | Facility: HOSPITAL | Age: 56
End: 2021-08-18
Payer: MEDICAID

## 2021-08-18 DIAGNOSIS — R06.02 SHORTNESS OF BREATH: ICD-10-CM

## 2021-08-18 DIAGNOSIS — J96.01 ACUTE RESPIRATORY FAILURE WITH HYPOXIA: ICD-10-CM

## 2021-08-18 DIAGNOSIS — F20.9 SCHIZOPHRENIA, UNSPECIFIED: ICD-10-CM

## 2021-08-18 DIAGNOSIS — Z87.891 PERSONAL HISTORY OF NICOTINE DEPENDENCE: ICD-10-CM

## 2021-08-18 DIAGNOSIS — J43.9 EMPHYSEMA, UNSPECIFIED: ICD-10-CM

## 2021-08-18 DIAGNOSIS — E43 UNSPECIFIED SEVERE PROTEIN-CALORIE MALNUTRITION: ICD-10-CM

## 2021-08-18 DIAGNOSIS — F41.9 ANXIETY DISORDER, UNSPECIFIED: ICD-10-CM

## 2021-08-18 DIAGNOSIS — F19.19 OTHER PSYCHOACTIVE SUBSTANCE ABUSE WITH UNSPECIFIED PSYCHOACTIVE SUBSTANCE-INDUCED DISORDER: ICD-10-CM

## 2021-08-18 DIAGNOSIS — Z59.0 HOMELESSNESS: ICD-10-CM

## 2021-08-18 DIAGNOSIS — I48.91 UNSPECIFIED ATRIAL FIBRILLATION: ICD-10-CM

## 2021-08-18 DIAGNOSIS — E87.2 ACIDOSIS: ICD-10-CM

## 2021-08-18 DIAGNOSIS — C34.90 MALIGNANT NEOPLASM OF UNSPECIFIED PART OF UNSPECIFIED BRONCHUS OR LUNG: ICD-10-CM

## 2021-08-18 DIAGNOSIS — Z51.5 ENCOUNTER FOR PALLIATIVE CARE: ICD-10-CM

## 2021-08-18 DIAGNOSIS — F31.9 BIPOLAR DISORDER, UNSPECIFIED: ICD-10-CM

## 2021-08-18 DIAGNOSIS — C79.89 SECONDARY MALIGNANT NEOPLASM OF OTHER SPECIFIED SITES: ICD-10-CM

## 2021-08-18 LAB
ANION GAP SERPL CALC-SCNC: 9 MMOL/L — SIGNIFICANT CHANGE UP (ref 5–17)
BUN SERPL-MCNC: 20.7 MG/DL — HIGH (ref 8–20)
CALCIUM SERPL-MCNC: 9.2 MG/DL — SIGNIFICANT CHANGE UP (ref 8.6–10.2)
CHLORIDE SERPL-SCNC: 105 MMOL/L — SIGNIFICANT CHANGE UP (ref 98–107)
CO2 SERPL-SCNC: 29 MMOL/L — SIGNIFICANT CHANGE UP (ref 22–29)
CREAT SERPL-MCNC: 0.57 MG/DL — SIGNIFICANT CHANGE UP (ref 0.5–1.3)
GLUCOSE SERPL-MCNC: 149 MG/DL — HIGH (ref 70–99)
HCT VFR BLD CALC: 30 % — LOW (ref 39–50)
HGB BLD-MCNC: 9.6 G/DL — LOW (ref 13–17)
MAGNESIUM SERPL-MCNC: 2.1 MG/DL — SIGNIFICANT CHANGE UP (ref 1.6–2.6)
MCHC RBC-ENTMCNC: 29.7 PG — SIGNIFICANT CHANGE UP (ref 27–34)
MCHC RBC-ENTMCNC: 32 GM/DL — SIGNIFICANT CHANGE UP (ref 32–36)
MCV RBC AUTO: 92.9 FL — SIGNIFICANT CHANGE UP (ref 80–100)
PLATELET # BLD AUTO: 245 K/UL — SIGNIFICANT CHANGE UP (ref 150–400)
POTASSIUM SERPL-MCNC: 4 MMOL/L — SIGNIFICANT CHANGE UP (ref 3.5–5.3)
POTASSIUM SERPL-SCNC: 4 MMOL/L — SIGNIFICANT CHANGE UP (ref 3.5–5.3)
RBC # BLD: 3.23 M/UL — LOW (ref 4.2–5.8)
RBC # FLD: 17 % — HIGH (ref 10.3–14.5)
SODIUM SERPL-SCNC: 143 MMOL/L — SIGNIFICANT CHANGE UP (ref 135–145)
WBC # BLD: 7.88 K/UL — SIGNIFICANT CHANGE UP (ref 3.8–10.5)
WBC # FLD AUTO: 7.88 K/UL — SIGNIFICANT CHANGE UP (ref 3.8–10.5)

## 2021-08-18 PROCEDURE — 99291 CRITICAL CARE FIRST HOUR: CPT

## 2021-08-18 PROCEDURE — 71045 X-RAY EXAM CHEST 1 VIEW: CPT | Mod: 26

## 2021-08-18 PROCEDURE — 31636 BRONCHOSCOPY BRONCH STENTS: CPT

## 2021-08-18 PROCEDURE — 99223 1ST HOSP IP/OBS HIGH 75: CPT

## 2021-08-18 RX ORDER — NICARDIPINE HYDROCHLORIDE 30 MG/1
5 CAPSULE, EXTENDED RELEASE ORAL
Qty: 40 | Refills: 0 | Status: DISCONTINUED | OUTPATIENT
Start: 2021-08-18 | End: 2021-08-20

## 2021-08-18 RX ORDER — IPRATROPIUM/ALBUTEROL SULFATE 18-103MCG
3 AEROSOL WITH ADAPTER (GRAM) INHALATION EVERY 6 HOURS
Refills: 0 | Status: DISCONTINUED | OUTPATIENT
Start: 2021-08-18 | End: 2021-08-27

## 2021-08-18 RX ORDER — RISPERIDONE 4 MG/1
1 TABLET ORAL
Refills: 0 | Status: DISCONTINUED | OUTPATIENT
Start: 2021-08-18 | End: 2021-08-27

## 2021-08-18 RX ORDER — PIPERACILLIN AND TAZOBACTAM 4; .5 G/20ML; G/20ML
3.38 INJECTION, POWDER, LYOPHILIZED, FOR SOLUTION INTRAVENOUS EVERY 8 HOURS
Refills: 0 | Status: DISCONTINUED | OUTPATIENT
Start: 2021-08-18 | End: 2021-08-24

## 2021-08-18 RX ORDER — MIDAZOLAM HYDROCHLORIDE 1 MG/ML
2 INJECTION, SOLUTION INTRAMUSCULAR; INTRAVENOUS ONCE
Refills: 0 | Status: DISCONTINUED | OUTPATIENT
Start: 2021-08-18 | End: 2021-08-18

## 2021-08-18 RX ORDER — CHLORHEXIDINE GLUCONATE 213 G/1000ML
15 SOLUTION TOPICAL EVERY 12 HOURS
Refills: 0 | Status: DISCONTINUED | OUTPATIENT
Start: 2021-08-18 | End: 2021-08-19

## 2021-08-18 RX ORDER — COLLAGENASE CLOSTRIDIUM HIST. 250 UNIT/G
1 OINTMENT (GRAM) TOPICAL DAILY
Refills: 0 | Status: DISCONTINUED | OUTPATIENT
Start: 2021-08-18 | End: 2021-08-27

## 2021-08-18 RX ORDER — RISPERIDONE 4 MG/1
1 TABLET ORAL
Refills: 0 | Status: DISCONTINUED | OUTPATIENT
Start: 2021-08-18 | End: 2021-08-18

## 2021-08-18 RX ORDER — ENOXAPARIN SODIUM 100 MG/ML
40 INJECTION SUBCUTANEOUS DAILY
Refills: 0 | Status: DISCONTINUED | OUTPATIENT
Start: 2021-08-18 | End: 2021-08-27

## 2021-08-18 RX ORDER — FENTANYL CITRATE 50 UG/ML
0.5 INJECTION INTRAVENOUS
Qty: 2500 | Refills: 0 | Status: DISCONTINUED | OUTPATIENT
Start: 2021-08-18 | End: 2021-08-20

## 2021-08-18 RX ORDER — AMIODARONE HYDROCHLORIDE 400 MG/1
400 TABLET ORAL EVERY 8 HOURS
Refills: 0 | Status: DISCONTINUED | OUTPATIENT
Start: 2021-08-18 | End: 2021-08-18

## 2021-08-18 RX ORDER — LACTOBACILLUS ACIDOPHILUS 100MM CELL
1 CAPSULE ORAL
Refills: 0 | Status: DISCONTINUED | OUTPATIENT
Start: 2021-08-18 | End: 2021-08-18

## 2021-08-18 RX ORDER — AMIODARONE HYDROCHLORIDE 400 MG/1
200 TABLET ORAL DAILY
Refills: 0 | Status: DISCONTINUED | OUTPATIENT
Start: 2021-08-20 | End: 2021-08-25

## 2021-08-18 RX ORDER — AMIODARONE HYDROCHLORIDE 400 MG/1
400 TABLET ORAL EVERY 8 HOURS
Refills: 0 | Status: COMPLETED | OUTPATIENT
Start: 2021-08-19 | End: 2021-08-19

## 2021-08-18 RX ORDER — PANTOPRAZOLE SODIUM 20 MG/1
40 TABLET, DELAYED RELEASE ORAL DAILY
Refills: 0 | Status: DISCONTINUED | OUTPATIENT
Start: 2021-08-18 | End: 2021-08-20

## 2021-08-18 RX ORDER — PROPOFOL 10 MG/ML
5 INJECTION, EMULSION INTRAVENOUS
Qty: 1000 | Refills: 0 | Status: DISCONTINUED | OUTPATIENT
Start: 2021-08-18 | End: 2021-08-19

## 2021-08-18 RX ORDER — BUDESONIDE AND FORMOTEROL FUMARATE DIHYDRATE 160; 4.5 UG/1; UG/1
2 AEROSOL RESPIRATORY (INHALATION)
Refills: 0 | Status: DISCONTINUED | OUTPATIENT
Start: 2021-08-18 | End: 2021-08-27

## 2021-08-18 RX ORDER — NICARDIPINE HYDROCHLORIDE 30 MG/1
5 CAPSULE, EXTENDED RELEASE ORAL
Qty: 40 | Refills: 0 | Status: DISCONTINUED | OUTPATIENT
Start: 2021-08-18 | End: 2021-08-18

## 2021-08-18 RX ADMIN — PROPOFOL 20 MICROGRAM(S)/KG/MIN: 10 INJECTION, EMULSION INTRAVENOUS at 07:08

## 2021-08-18 RX ADMIN — BUDESONIDE AND FORMOTEROL FUMARATE DIHYDRATE 2 PUFF(S): 160; 4.5 AEROSOL RESPIRATORY (INHALATION) at 21:06

## 2021-08-18 RX ADMIN — Medication 1 APPLICATION(S): at 13:20

## 2021-08-18 RX ADMIN — Medication 3 MILLILITER(S): at 08:47

## 2021-08-18 RX ADMIN — PIPERACILLIN AND TAZOBACTAM 25 GRAM(S): 4; .5 INJECTION, POWDER, LYOPHILIZED, FOR SOLUTION INTRAVENOUS at 13:24

## 2021-08-18 RX ADMIN — RISPERIDONE 1 MILLIGRAM(S): 4 TABLET ORAL at 17:35

## 2021-08-18 RX ADMIN — AMIODARONE HYDROCHLORIDE 400 MILLIGRAM(S): 400 TABLET ORAL at 13:24

## 2021-08-18 RX ADMIN — RISPERIDONE 1 MILLIGRAM(S): 4 TABLET ORAL at 06:18

## 2021-08-18 RX ADMIN — CHLORHEXIDINE GLUCONATE 5 MILLILITER(S): 213 SOLUTION TOPICAL at 06:18

## 2021-08-18 RX ADMIN — Medication 3 MILLILITER(S): at 21:04

## 2021-08-18 RX ADMIN — Medication 60 MILLIGRAM(S): at 13:23

## 2021-08-18 RX ADMIN — SODIUM CHLORIDE 75 MILLILITER(S): 9 INJECTION, SOLUTION INTRAVENOUS at 07:08

## 2021-08-18 RX ADMIN — Medication 1 APPLICATION(S): at 21:26

## 2021-08-18 RX ADMIN — Medication 3 MILLILITER(S): at 16:21

## 2021-08-18 RX ADMIN — ENOXAPARIN SODIUM 40 MILLIGRAM(S): 100 INJECTION SUBCUTANEOUS at 21:27

## 2021-08-18 RX ADMIN — FENTANYL CITRATE 3.15 MICROGRAM(S)/KG/HR: 50 INJECTION INTRAVENOUS at 21:24

## 2021-08-18 RX ADMIN — MIDAZOLAM HYDROCHLORIDE 2 MILLIGRAM(S): 1 INJECTION, SOLUTION INTRAMUSCULAR; INTRAVENOUS at 12:30

## 2021-08-18 RX ADMIN — POLYETHYLENE GLYCOL 3350 17 GRAM(S): 17 POWDER, FOR SOLUTION ORAL at 13:21

## 2021-08-18 RX ADMIN — FENTANYL CITRATE 3.15 MICROGRAM(S)/KG/HR: 50 INJECTION INTRAVENOUS at 07:07

## 2021-08-18 RX ADMIN — PIPERACILLIN AND TAZOBACTAM 25 GRAM(S): 4; .5 INJECTION, POWDER, LYOPHILIZED, FOR SOLUTION INTRAVENOUS at 21:25

## 2021-08-18 RX ADMIN — PROPOFOL 1.89 MICROGRAM(S)/KG/MIN: 10 INJECTION, EMULSION INTRAVENOUS at 21:25

## 2021-08-18 RX ADMIN — PANTOPRAZOLE SODIUM 40 MILLIGRAM(S): 20 TABLET, DELAYED RELEASE ORAL at 13:21

## 2021-08-18 RX ADMIN — Medication 60 MILLIGRAM(S): at 21:26

## 2021-08-18 RX ADMIN — PANTOPRAZOLE SODIUM 40 MILLIGRAM(S): 20 TABLET, DELAYED RELEASE ORAL at 21:26

## 2021-08-18 RX ADMIN — CHLORHEXIDINE GLUCONATE 1 APPLICATION(S): 213 SOLUTION TOPICAL at 13:20

## 2021-08-18 RX ADMIN — AMIODARONE HYDROCHLORIDE 400 MILLIGRAM(S): 400 TABLET ORAL at 06:18

## 2021-08-18 RX ADMIN — Medication 3 MILLILITER(S): at 03:27

## 2021-08-18 RX ADMIN — PIPERACILLIN AND TAZOBACTAM 25 GRAM(S): 4; .5 INJECTION, POWDER, LYOPHILIZED, FOR SOLUTION INTRAVENOUS at 06:23

## 2021-08-18 RX ADMIN — BUDESONIDE AND FORMOTEROL FUMARATE DIHYDRATE 2 PUFF(S): 160; 4.5 AEROSOL RESPIRATORY (INHALATION) at 08:48

## 2021-08-18 RX ADMIN — CHLORHEXIDINE GLUCONATE 15 MILLILITER(S): 213 SOLUTION TOPICAL at 16:51

## 2021-08-18 RX ADMIN — Medication 60 MILLIGRAM(S): at 06:17

## 2021-08-18 SDOH — ECONOMIC STABILITY - HOUSING INSECURITY: HOMELESSNESS: Z59.0

## 2021-08-18 NOTE — PROGRESS NOTE ADULT - SUBJECTIVE AND OBJECTIVE BOX
Significant recent/past 24 hr events:  POD#2 Lt bronchial stenting with Dr Amaya.  No acute overnight events. Pt remained intubated and sedation (Propofol/Fent gtt)   NPO for Rt bronchial stent in AM.     Subjective:  Review of Systems       Unable to obtain due to: intubated & sedated     Patient is a 56y old  Male who presents with a chief complaint of SOB with diagnosis of stage 4  lung cancer and COPD. (17 Aug 2021 21:48)    HPI:  57 y/o M homeless with a PMH of smoker ( quit one month ago), polysubstance abuse ( cocaine , marihuana), COPD, depression recently diagnosed with lung cancer came to the ED BIBEMs from [place] due to respiratory distress. Pt reports was diagnosed with lung cancer after an admission on [facility] on 7/7/21 due to suicide attempt. He is s/p right back chest wall mass biopsy on 7/8/21 that showed poorly differentiated carcinoma and EBUS/ bronch by CT surgery was deferred. Pt was recently discharged from [facility] to [place] on 8/6/21.Pt reports since was diagnosed with lung cancer is experiencing progressive SOB. SOB got worse since was d/c from [facility] one week ago. This morning patient wake up with extreme SOB, was gasping for air and EMS called. He endorses associated sweating, chest pain and palpitations during the event. Pt have been experiencing on/off  substernal chest pain and palpitations since was dx with lung cancer. He describes chest pain as pressure like, non radiating and exacerbated with coughing. He endorse cough since approximately one month ago of yellow sputum and tingle blood on it. He states has hemoptysis since 8/2/21  but have been getting better since then. Last episode of hemoptysis was three days ago. He have loss approximately 10-20 pounds since July, 2021.He reports supposed to follow up with hematology- oncology but have not seen  him yet. He has been receiving radiation being the second dose today but missed the appointment due was but missed the appointment due was brought to the hospital. He denies fever, chills, nausea , vomits, leg swelling, orthopnea. Patient had a CT of chest showing an endobronchial lesion. (13 Aug 2021 15:23)    PAST MEDICAL & SURGICAL HISTORY:  Schizophrenia    Bipolar disorder    Depression    Lung cancer    Polysubstance abuse    No significant past surgical history      FAMILY HISTORY:  FH: diabetes mellitus (Mother)    FH: lung cancer  father      Vitals   ICU Vital Signs Last 24 Hrs  T(C): 36.7 (18 Aug 2021 04:00), Max: 38.1 (17 Aug 2021 12:00)  T(F): 98.1 (18 Aug 2021 04:00), Max: 100.6 (17 Aug 2021 12:00)  HR: 62 (18 Aug 2021 03:45) (62 - 196)  BP: 121/67 (18 Aug 2021 03:30) (87/52 - 133/73)  BP(mean): 88 (18 Aug 2021 03:30) (64 - 98)  ABP: 125/125 (18 Aug 2021 02:45) (86/86 - 148/78)  ABP(mean): 125 (18 Aug 2021 02:45) (53 - 125)  RR: 51 (18 Aug 2021 03:45) (16 - 57)  SpO2: 97% (18 Aug 2021 03:45) (91% - 98%)      VENT SETTINGS   Mode: AC/ CMV (Assist Control/ Continuous Mandatory Ventilation)  RR (machine): 16  TV (machine): 550  FiO2: 50  PEEP: 5  MAP: 10  PIP: 20    ABG - ( 17 Aug 2021 14:10 )  pH, Arterial: 7.440 pH, Blood: x     /  pCO2: 49    /  pO2: 88    / HCO3: 33    / Base Excess: 9.1   /  SaO2: 98.2        I&O's Detail    16 Aug 2021 07:01  -  17 Aug 2021 07:00  --------------------------------------------------------  IN:    dextrose 5% + sodium chloride 0.9%: 997.5 mL    dextrose 5% + sodium chloride 0.9%: 450 mL    FentaNYL: 132.6 mL    FentaNYL: 353.6 mL    IV PiggyBack: 100 mL    multiple electrolytes Injection Type 1 Bolus: 500 mL    Phenylephrine: 14.2 mL    Propofol: 302.4 mL    Propofol: 113.4 mL    sodium chloride 0.9%: 160 mL  Total IN: 3123.7 mL    OUT:    Indwelling Catheter - Urethral (mL): 1370 mL  Total OUT: 1370 mL    Total NET: 1753.7 mL      17 Aug 2021 07:01  -  18 Aug 2021 05:12  --------------------------------------------------------  IN:    Albumin 5%  - 250 mL: 250 mL    dextrose 5% + sodium chloride 0.9%: 1575 mL    FentaNYL: 389.3 mL    IV PiggyBack: 300 mL    Phenylephrine: 90.7 mL    Propofol: 389.4 mL    sodium chloride 0.9%: 105 mL    Vital1.5: 180 mL  Total IN: 3279.4 mL    OUT:    Indwelling Catheter - Urethral (mL): 1630 mL  Total OUT: 1630 mL    Total NET: 1649.4 mL    LABS                        9.6    7.88  )-----------( 245      ( 18 Aug 2021 03:15 )             30.0     08-18    143  |  105  |  20.7<H>  ----------------------------<  149<H>  4.0   |  29.0  |  0.57    Ca    9.2      18 Aug 2021 03:15  Phos  3.6     08-16  Mg     2.1     08-18    TPro  6.0<L>  /  Alb  3.1<L>  /  TBili  <0.2<L>  /  DBili  x   /  AST  10  /  ALT  15  /  AlkPhos  99  08-16    LIVER FUNCTIONS - ( 16 Aug 2021 16:27 )  Alb: 3.1 g/dL / Pro: 6.0 g/dL / ALK PHOS: 99 U/L / ALT: 15 U/L / AST: 10 U/L / GGT: x           PT/INR - ( 16 Aug 2021 16:27 )   PT: 13.6 sec;   INR: 1.18 ratio         PTT - ( 16 Aug 2021 16:27 )  PTT:20.5 sec        MEDICATIONS  (STANDING):  albuterol/ipratropium for Nebulization 3 milliLiter(s) Nebulizer every 6 hours  aMIOdarone    Tablet 400 milliGRAM(s) Oral every 8 hours  budesonide 160 MICROgram(s)/formoterol 4.5 MICROgram(s) Inhaler 2 Puff(s) Inhalation two times a day  chlorhexidine 0.12% Liquid 5 milliLiter(s) Oral Mucosa two times a day  chlorhexidine 2% Cloths 1 Application(s) Topical daily  collagenase Ointment 1 Application(s) Topical daily  dextrose 5% + sodium chloride 0.9%. 1000 milliLiter(s) (75 mL/Hr) IV Continuous <Continuous>  fentaNYL   Infusion 0.5 MICROgram(s)/kG/Hr (3.15 mL/Hr) IV Continuous <Continuous>  meperidine     Injectable 25 milliGRAM(s) IV Push once  methylPREDNISolone sodium succinate Injectable 60 milliGRAM(s) IV Push every 8 hours  pantoprazole  Injectable 40 milliGRAM(s) IV Push daily  phenylephrine    Infusion 0.2 MICROgram(s)/kG/Min (4.73 mL/Hr) IV Continuous <Continuous>  piperacillin/tazobactam IVPB.. 3.375 Gram(s) IV Intermittent every 8 hours  polyethylene glycol 3350 17 Gram(s) Oral daily  potassium chloride  10 mEq/50 mL IVPB 10 milliEquivalent(s) IV Intermittent every 1 hour  potassium chloride  10 mEq/50 mL IVPB 10 milliEquivalent(s) IV Intermittent every 1 hour  potassium chloride  10 mEq/50 mL IVPB 10 milliEquivalent(s) IV Intermittent every 1 hour  propofol Infusion 52.91 MICROgram(s)/kG/Min (20 mL/Hr) IV Continuous <Continuous>  risperiDONE   Tablet 1 milliGRAM(s) Oral two times a day  sodium chloride 0.9%. 1000 milliLiter(s) (10 mL/Hr) IV Continuous <Continuous>  sodium chloride 0.9%. 1000 milliLiter(s) (10 mL/Hr) IV Continuous <Continuous>    MEDICATIONS  (PRN):      Allergies:  Allergy Status Unknown  paper plates/tray and plastic utensils only (Unknown)      Physical Exam:   Constitutional: Intubated/sedated  Neck: trachea midline.  ET in place.  Respiratory: +Rhonchi bilaterally to auscultation, no accessory muscle use noted.  Cardiovascular: NSR, normal S1, S2; no murmurs or rub  Gastrointestinal: Soft, non-tender, non distended, normal bowel sounds  Extremities: No peripheral edema, no cyanosis, no clubbing   Vascular: Equal and normal pulses: 2+ peripheral pulses throughout  Neurological: Intubated/sedated (Propofol/Fent)  Skin: warm, dry, well perfused, no rashes  Lines: Lt IJ TLC, Rt femoral [place]      Code Status: Full Code       Critical care time spent: 35 minutes (reviewing chart including medication, labs and imaging results, discussions with interdisciplinary team, discussing goals of care/advanced directives, counseling patient and/or family, non-inclusive of procedures)    Case including assessment/plan of care discussed with attending.

## 2021-08-18 NOTE — BRIEF OPERATIVE NOTE - NSICDXBRIEFPOSTOP_GEN_ALL_CORE_FT
POST-OP DIAGNOSIS:  Lung cancer 16-Aug-2021 15:40:21  Lupe Soliman  
POST-OP DIAGNOSIS:  Lung cancer 16-Aug-2021 15:40:21  Lupe Soliman

## 2021-08-18 NOTE — CONSULT NOTE ADULT - ASSESSMENT
57 y/o M homeless with a PMH of smoker ( quit one month ago), polysubstance abuse ( cocaine , marihuana), COPD, depression recently diagnosed with lung cancer came to the ED BIBEMs from [place] due to respiratory distress s/p emergent ET intubation and endobronchial stent placement.    # respiratory failure  # occluded RLL bronchus  -s/p ET intubation and left bronchial stenting  -pending Rt bronchial stent today  -appreciate CT sx management    # lung cancer  -pt receiving RT and GSH, will need to resume when pt is stabilized  -pt will follow up with Dr. Bhatt at [facility] for systemic treatment after discharge      Kade Zuluaga MD  Medical [place] and Hematology  [facility]  [phone]

## 2021-08-18 NOTE — PROGRESS NOTE ADULT - PROBLEM SELECTOR PLAN 4
Pantoprazole for GI prophylaxis  DVT-SCDs (Holding Lovenox at this time for anticipated Rt bronchial stent on 8/18 per Dr Amaya)

## 2021-08-18 NOTE — PROGRESS NOTE ADULT - PROBLEM SELECTOR PLAN 1
POD#2 Lt Bronchial stenting with Dr Amaya.  NPO for planned Rt bronchial stenting with Dr Amaya in AM.  Maintain sedation with propofol and fentanyl as appropriate for RASS 0 - -1   Continue Duonebs, Symbicort, Solumedrol   Maintain Guallpa in critically ill pt, replace lytes PRN  Cont supportive care at this time and discuss case in AM with Thoracic Team.

## 2021-08-18 NOTE — BRIEF OPERATIVE NOTE - NSICDXBRIEFPREOP_GEN_ALL_CORE_FT
PRE-OP DIAGNOSIS:  Lung cancer 16-Aug-2021 15:40:11  Lupe Soliman  
PRE-OP DIAGNOSIS:  Lung cancer 16-Aug-2021 15:40:11  Lupe Soliman

## 2021-08-18 NOTE — BRIEF OPERATIVE NOTE - NSICDXBRIEFPROCEDURE_GEN_ALL_CORE_FT
PROCEDURES:  Insertion, stent, bronchial 16-Aug-2021 15:39:51 Bronchus intermedium, [id number] stent Lupe Soliman  
PROCEDURES:  Insertion, stent, bronchial 16-Aug-2021 15:39:51 with Lupe Kenney

## 2021-08-18 NOTE — BRIEF OPERATIVE NOTE - OPERATION/FINDINGS
Compression L bronchus and R Bronchus Intermedius.    First stent deployment too proximal, removed and new stent placed, positioning confirmed by bronchoscopy and flouroscopy
stenosis of bronchus intermedius

## 2021-08-18 NOTE — PROGRESS NOTE ADULT - ASSESSMENT
57 y/o M homeless with a PMH of smoker ( quit one month ago), polysubstance abuse ( cocaine , marihuana), COPD, depression recently diagnosed with lung cancer came to the ED BIBEMs from [place] due to respiratory distress.  -Pt became unstable, in respiratory distress accompanied with hypertension and tachycardia.  Pt intubated by general anesthesia without event.  A line placed for BP management, Guallpa for strict I/Os in critically ill pt.  Maintaining sedation with propofol and Fentanyl.  POD#2 Bronchial stenting (Lt)

## 2021-08-19 LAB
ANION GAP SERPL CALC-SCNC: 9 MMOL/L — SIGNIFICANT CHANGE UP (ref 5–17)
BUN SERPL-MCNC: 20.7 MG/DL — HIGH (ref 8–20)
CALCIUM SERPL-MCNC: 9.4 MG/DL — SIGNIFICANT CHANGE UP (ref 8.6–10.2)
CHLORIDE SERPL-SCNC: 104 MMOL/L — SIGNIFICANT CHANGE UP (ref 98–107)
CO2 SERPL-SCNC: 29 MMOL/L — SIGNIFICANT CHANGE UP (ref 22–29)
CREAT SERPL-MCNC: 0.41 MG/DL — LOW (ref 0.5–1.3)
GAS PNL BLDA: SIGNIFICANT CHANGE UP
GLUCOSE SERPL-MCNC: 139 MG/DL — HIGH (ref 70–99)
HCT VFR BLD CALC: 30.9 % — LOW (ref 39–50)
HGB BLD-MCNC: 10 G/DL — LOW (ref 13–17)
MAGNESIUM SERPL-MCNC: 2.2 MG/DL — SIGNIFICANT CHANGE UP (ref 1.6–2.6)
MCHC RBC-ENTMCNC: 29.9 PG — SIGNIFICANT CHANGE UP (ref 27–34)
MCHC RBC-ENTMCNC: 32.4 GM/DL — SIGNIFICANT CHANGE UP (ref 32–36)
MCV RBC AUTO: 92.2 FL — SIGNIFICANT CHANGE UP (ref 80–100)
PLATELET # BLD AUTO: 234 K/UL — SIGNIFICANT CHANGE UP (ref 150–400)
POTASSIUM SERPL-MCNC: 3.9 MMOL/L — SIGNIFICANT CHANGE UP (ref 3.5–5.3)
POTASSIUM SERPL-SCNC: 3.9 MMOL/L — SIGNIFICANT CHANGE UP (ref 3.5–5.3)
RBC # BLD: 3.35 M/UL — LOW (ref 4.2–5.8)
RBC # FLD: 16.5 % — HIGH (ref 10.3–14.5)
SODIUM SERPL-SCNC: 142 MMOL/L — SIGNIFICANT CHANGE UP (ref 135–145)
WBC # BLD: 9.5 K/UL — SIGNIFICANT CHANGE UP (ref 3.8–10.5)
WBC # FLD AUTO: 9.5 K/UL — SIGNIFICANT CHANGE UP (ref 3.8–10.5)

## 2021-08-19 PROCEDURE — 93010 ELECTROCARDIOGRAM REPORT: CPT

## 2021-08-19 PROCEDURE — 71045 X-RAY EXAM CHEST 1 VIEW: CPT | Mod: 26

## 2021-08-19 PROCEDURE — 99232 SBSQ HOSP IP/OBS MODERATE 35: CPT

## 2021-08-19 RX ORDER — FENTANYL CITRATE 50 UG/ML
50 INJECTION INTRAVENOUS
Refills: 0 | Status: DISCONTINUED | OUTPATIENT
Start: 2021-08-19 | End: 2021-08-20

## 2021-08-19 RX ORDER — DEXAMETHASONE 0.5 MG/5ML
4 ELIXIR ORAL ONCE
Refills: 0 | Status: COMPLETED | OUTPATIENT
Start: 2021-08-19 | End: 2021-08-19

## 2021-08-19 RX ORDER — DEXMEDETOMIDINE HYDROCHLORIDE IN 0.9% SODIUM CHLORIDE 4 UG/ML
0.2 INJECTION INTRAVENOUS
Qty: 200 | Refills: 0 | Status: DISCONTINUED | OUTPATIENT
Start: 2021-08-19 | End: 2021-08-20

## 2021-08-19 RX ORDER — POTASSIUM CHLORIDE 20 MEQ
20 PACKET (EA) ORAL ONCE
Refills: 0 | Status: COMPLETED | OUTPATIENT
Start: 2021-08-19 | End: 2021-08-19

## 2021-08-19 RX ORDER — KETOROLAC TROMETHAMINE 30 MG/ML
30 SYRINGE (ML) INJECTION EVERY 6 HOURS
Refills: 0 | Status: DISCONTINUED | OUTPATIENT
Start: 2021-08-19 | End: 2021-08-20

## 2021-08-19 RX ORDER — EPINEPHRINE 11.25MG/ML
0.5 SOLUTION, NON-ORAL INHALATION ONCE
Refills: 0 | Status: COMPLETED | OUTPATIENT
Start: 2021-08-19 | End: 2021-08-19

## 2021-08-19 RX ADMIN — Medication 0.2 MILLIGRAM(S): at 21:55

## 2021-08-19 RX ADMIN — RISPERIDONE 1 MILLIGRAM(S): 4 TABLET ORAL at 06:13

## 2021-08-19 RX ADMIN — Medication 30 MILLIGRAM(S): at 10:36

## 2021-08-19 RX ADMIN — Medication 1 APPLICATION(S): at 12:47

## 2021-08-19 RX ADMIN — Medication 30 MILLIGRAM(S): at 21:49

## 2021-08-19 RX ADMIN — PIPERACILLIN AND TAZOBACTAM 25 GRAM(S): 4; .5 INJECTION, POWDER, LYOPHILIZED, FOR SOLUTION INTRAVENOUS at 06:13

## 2021-08-19 RX ADMIN — Medication 3 MILLILITER(S): at 20:52

## 2021-08-19 RX ADMIN — BUDESONIDE AND FORMOTEROL FUMARATE DIHYDRATE 2 PUFF(S): 160; 4.5 AEROSOL RESPIRATORY (INHALATION) at 08:30

## 2021-08-19 RX ADMIN — Medication 30 MILLIGRAM(S): at 10:06

## 2021-08-19 RX ADMIN — CHLORHEXIDINE GLUCONATE 15 MILLILITER(S): 213 SOLUTION TOPICAL at 06:16

## 2021-08-19 RX ADMIN — Medication 0.5 MILLILITER(S): at 12:52

## 2021-08-19 RX ADMIN — PROPOFOL 1.89 MICROGRAM(S)/KG/MIN: 10 INJECTION, EMULSION INTRAVENOUS at 09:03

## 2021-08-19 RX ADMIN — Medication 4 MILLIGRAM(S): at 12:46

## 2021-08-19 RX ADMIN — RISPERIDONE 1 MILLIGRAM(S): 4 TABLET ORAL at 17:07

## 2021-08-19 RX ADMIN — FENTANYL CITRATE 3.15 MICROGRAM(S)/KG/HR: 50 INJECTION INTRAVENOUS at 12:46

## 2021-08-19 RX ADMIN — FENTANYL CITRATE 50 MICROGRAM(S): 50 INJECTION INTRAVENOUS at 11:31

## 2021-08-19 RX ADMIN — ENOXAPARIN SODIUM 40 MILLIGRAM(S): 100 INJECTION SUBCUTANEOUS at 13:13

## 2021-08-19 RX ADMIN — Medication 60 MILLIGRAM(S): at 13:14

## 2021-08-19 RX ADMIN — PIPERACILLIN AND TAZOBACTAM 25 GRAM(S): 4; .5 INJECTION, POWDER, LYOPHILIZED, FOR SOLUTION INTRAVENOUS at 22:00

## 2021-08-19 RX ADMIN — Medication 60 MILLIGRAM(S): at 21:55

## 2021-08-19 RX ADMIN — AMIODARONE HYDROCHLORIDE 400 MILLIGRAM(S): 400 TABLET ORAL at 21:55

## 2021-08-19 RX ADMIN — Medication 3 MILLILITER(S): at 16:30

## 2021-08-19 RX ADMIN — Medication 30 MILLIGRAM(S): at 17:07

## 2021-08-19 RX ADMIN — FENTANYL CITRATE 50 MICROGRAM(S): 50 INJECTION INTRAVENOUS at 12:01

## 2021-08-19 RX ADMIN — AMIODARONE HYDROCHLORIDE 400 MILLIGRAM(S): 400 TABLET ORAL at 06:13

## 2021-08-19 RX ADMIN — DEXMEDETOMIDINE HYDROCHLORIDE IN 0.9% SODIUM CHLORIDE 3.15 MICROGRAM(S)/KG/HR: 4 INJECTION INTRAVENOUS at 17:07

## 2021-08-19 RX ADMIN — Medication 60 MILLIGRAM(S): at 06:15

## 2021-08-19 RX ADMIN — Medication 3 MILLILITER(S): at 08:30

## 2021-08-19 RX ADMIN — PANTOPRAZOLE SODIUM 40 MILLIGRAM(S): 20 TABLET, DELAYED RELEASE ORAL at 13:13

## 2021-08-19 RX ADMIN — PIPERACILLIN AND TAZOBACTAM 25 GRAM(S): 4; .5 INJECTION, POWDER, LYOPHILIZED, FOR SOLUTION INTRAVENOUS at 13:14

## 2021-08-19 RX ADMIN — Medication 0.2 MILLIGRAM(S): at 17:07

## 2021-08-19 RX ADMIN — Medication 0.2 MILLIGRAM(S): at 10:03

## 2021-08-19 RX ADMIN — AMIODARONE HYDROCHLORIDE 400 MILLIGRAM(S): 400 TABLET ORAL at 13:15

## 2021-08-19 RX ADMIN — Medication 3 MILLILITER(S): at 03:56

## 2021-08-19 RX ADMIN — NICARDIPINE HYDROCHLORIDE 25 MG/HR: 30 CAPSULE, EXTENDED RELEASE ORAL at 13:15

## 2021-08-19 RX ADMIN — Medication 100 MILLIEQUIVALENT(S): at 06:16

## 2021-08-19 NOTE — AIRWAY REMOVAL NOTE  ADULT & PEDS - ARTIFICAL AIRWAY REMOVAL COMMENTS
Patient suctioned orally and endotracheally pre extubation and orally post.  Voice intact.  No complications.

## 2021-08-19 NOTE — PROGRESS NOTE ADULT - PROBLEM SELECTOR PLAN 1
s/p Lt Bronchial stenting on 8/16 and Rt Bronchial stenting on 8/18 with Dr Amaya.  Maintain sedation with propofol and fentanyl as appropriate for RASS 0 - -1   Continue Duonebs, Symbicort, Solumedrol   Maintain Guallpa in critically ill pt, replace lytes PRN  Continue TFs for nutritional support

## 2021-08-19 NOTE — PROGRESS NOTE ADULT - SUBJECTIVE AND OBJECTIVE BOX
Subjective:  57yo M intubated, sedated, stable.      HPI:  57 y/o M homeless with a PMH of smoker ( quit one month ago), polysubstance abuse ( cocaine , marihuana), COPD, depression recently diagnosed with lung cancer came to the ED BIBEMs from [place] due to respiratory distress. Pt reports was diagnosed with lung cancer after an admission on [facility] on 7/7/21 due to suicide attempt. He is s/p right back chest wall mass biopsy on 7/8/21 that showed poorly differentiated carcinoma and EBUS/ bronch by CT surgery was deferred. Pt was recently discharged from [facility] to [place] on 8/6/21.Pt reports since was diagnosed with lung cancer is experiencing progressive SOB. SOB got worse since was d/c from [facility] one week ago. This morning patient wake up with extreme SOB, was gasping for air and EMS called. He endorses associated sweating, chest pain and palpitations during the event. Pt have been experiencing on/off  substernal chest pain and palpitations since was dx with lung cancer. He describes chest pain as pressure like, non radiating and exacerbated with coughing. He endorse cough since approximately one month ago of yellow sputum and tingle blood on it. He states has hemoptysis since 8/2/21  but have been getting better since then. Last episode of hemoptysis was three days ago. He have loss approximately 10-20 pounds since July, 2021.He reports supposed to follow up with hematology- oncology but have not seen  him yet. He has been receiving radiation being the second dose today but missed the appointment due was but missed the appointment due was brought to the hospital. He denies fever, chills, nausea , vomits, leg swelling, orthopnea. Patient had a CT of chest showing an endobronchial lesion. (13 Aug 2021 15:23)          PAST MEDICAL & SURGICAL HISTORY:  Schizophrenia    Bipolar disorder    Depression    Lung cancer    Polysubstance abuse    No significant past surgical history            MEDICATIONS  (STANDING):  albuterol/ipratropium for Nebulization 3 milliLiter(s) Nebulizer every 6 hours  aMIOdarone    Tablet 400 milliGRAM(s) Oral every 8 hours  budesonide 160 MICROgram(s)/formoterol 4.5 MICROgram(s) Inhaler 2 Puff(s) Inhalation two times a day  chlorhexidine 0.12% Liquid 15 milliLiter(s) Oral Mucosa every 12 hours  collagenase Ointment 1 Application(s) Topical daily  enoxaparin Injectable 40 milliGRAM(s) SubCutaneous daily  fentaNYL   Infusion. 0.5 MICROgram(s)/kG/Hr (3.15 mL/Hr) IV Continuous <Continuous>  methylPREDNISolone sodium succinate Injectable 60 milliGRAM(s) IV Push every 8 hours  niCARdipine Infusion 5 mG/Hr (25 mL/Hr) IV Continuous <Continuous>  pantoprazole  Injectable 40 milliGRAM(s) IV Push daily  piperacillin/tazobactam IVPB.. 3.375 Gram(s) IV Intermittent every 8 hours  propofol Infusion 5 MICROgram(s)/kG/Min (1.89 mL/Hr) IV Continuous <Continuous>  risperiDONE   Tablet 1 milliGRAM(s) Oral two times a day    MEDICATIONS  (PRN):          Allergies    Allergy Status Unknown    Intolerances    paper plates/tray and plastic utensils only (Unknown)        WEIGHTS:  Daily     Daily   Admit Wt: Drug Dosing Weight  Height (cm): 182.9 (17 Aug 2021 15:57)  Weight (kg): 63 (17 Aug 2021 15:57)  BMI (kg/m2): 18.8 (17 Aug 2021 15:57)  BSA (m2): 1.82 (17 Aug 2021 15:57)  I&O's Summary    17 Aug 2021 07:01  -  18 Aug 2021 07:00  --------------------------------------------------------  IN: 3632.8 mL / OUT: 1790 mL / NET: 1842.8 mL    18 Aug 2021 07:01  -  19 Aug 2021 04:22  --------------------------------------------------------  IN: 1755.9 mL / OUT: 1425 mL / NET: 330.9 mL        VITAL SIGNS:  ICU Vital Signs Last 24 Hrs  T(C): 36.2 (19 Aug 2021 00:00), Max: 36.8 (18 Aug 2021 10:00)  T(F): 97.2 (19 Aug 2021 00:00), Max: 98.2 (18 Aug 2021 10:00)  HR: 58 (19 Aug 2021 03:59) (58 - 88)  BP: 165/84 (18 Aug 2021 14:00) (111/60 - 168/91)  BP(mean): 116 (18 Aug 2021 14:00) (80 - 120)  ABP: 132/65 (19 Aug 2021 00:00) (121/51 - 187/82)  ABP(mean): 90 (19 Aug 2021 00:00) (69 - 123)  RR: 16 (19 Aug 2021 00:00) (16 - 65)  SpO2: 98% (19 Aug 2021 03:59) (93% - 99%)        All laboratory results, radiology and medications reviewed.    LABS:  08-19    142  |  104  |  20.7<H>  ----------------------------<  139<H>  3.9   |  29.0  |  0.41<L>    Ca    9.4      19 Aug 2021 02:25  Mg     2.2     08-19                                   10.0   9.50  )-----------( 234      ( 19 Aug 2021 02:25 )             30.9              ABG - ( 17 Aug 2021 14:10 )  pH, Arterial: 7.440 pH, Blood: x     /  pCO2: 49    /  pO2: 88    / HCO3: 33    / Base Excess: 9.1   /  SaO2: 98.2                  CAPILLARY BLOOD GLUCOSE                 PHYSICAL EXAM:  General:   no acute distress  Neurology:  intubated, sedated, unable to fully assess  Respiratory:  clear to auscultation bilaterally  CV:  regular rate and rhythm, normal S1 S2  Abdomen:  soft, nondistended, positive bowel sounds  Extremities:  warm, well perfused, no edema +DP pulses

## 2021-08-19 NOTE — CHART NOTE - NSCHARTNOTEFT_GEN_A_CORE
Source: Patient [ ]  Family [ ]   other [x ] EMR and staff     Enteral /Parenteral Nutrition: Diet, NPO with Tube Feed:   Tube Feeding Modality: Nasogastric  Vital 1.5 Ko (VITAL1.5)  Total Volume for 24 Hours (mL): 720  Continuous  Starting Tube Feed Rate {mL per Hour}: 30  Until Goal Tube Feed Rate (mL per Hour): 30  Tube Feed Duration (in Hours): 24  Tube Feed Start Time: 13:00  Tube Feed Stop Time: 23:59 (08-18-21 @ 17:32) [Active]      Current Weight:   8/17: 63 kg   8/16: 63.8 kg   8/14: 95.4 kg ? accuracy  8/13: 63 kg     % Weight Change : N/A (Generalized edema)     Pertinent Medications: MEDICATIONS  (STANDING):  albuterol/ipratropium for Nebulization 3 milliLiter(s) Nebulizer every 6 hours  aMIOdarone    Tablet 400 milliGRAM(s) Oral every 8 hours  budesonide 160 MICROgram(s)/formoterol 4.5 MICROgram(s) Inhaler 2 Puff(s) Inhalation two times a day  chlorhexidine 0.12% Liquid 15 milliLiter(s) Oral Mucosa every 12 hours  collagenase Ointment 1 Application(s) Topical daily  enoxaparin Injectable 40 milliGRAM(s) SubCutaneous daily  fentaNYL   Infusion. 0.5 MICROgram(s)/kG/Hr (3.15 mL/Hr) IV Continuous <Continuous>  methylPREDNISolone sodium succinate Injectable 60 milliGRAM(s) IV Push every 8 hours  niCARdipine Infusion 5 mG/Hr (25 mL/Hr) IV Continuous <Continuous>  pantoprazole  Injectable 40 milliGRAM(s) IV Push daily  piperacillin/tazobactam IVPB.. 3.375 Gram(s) IV Intermittent every 8 hours  propofol Infusion 5 MICROgram(s)/kG/Min (1.89 mL/Hr) IV Continuous <Continuous>  risperiDONE   Tablet 1 milliGRAM(s) Oral two times a day    MEDICATIONS  (PRN):    Pertinent Labs: CBC Full  -  ( 19 Aug 2021 02:25 )  WBC Count : 9.50 K/uL  RBC Count : 3.35 M/uL  Hemoglobin : 10.0 g/dL  Hematocrit : 30.9 %  Platelet Count - Automated : 234 K/uL  Mean Cell Volume : 92.2 fl  Mean Cell Hemoglobin : 29.9 pg  Mean Cell Hemoglobin Concentration : 32.4 gm/dL  Auto Neutrophil # : x  Auto Lymphocyte # : x  Auto Monocyte # : x  Auto Eosinophil # : x  Auto Basophil # : x  Auto Neutrophil % : x  Auto Lymphocyte % : x  Auto Monocyte % : x  Auto Eosinophil % : x  Auto Basophil % : x        Skin: R back wound     Nutrition focused physical exam Previously conducted - found signs of malnutrition [ ]absent [x ]present    Subcutaneous fat loss: moderate [ ] Orbital fat pads region, [ ]Buccal fat region, [x ]Triceps region,  [ ]Ribs region    Muscle wasting: moderate [x ]Temples region, [x ]Clavicle region, [ x]Shoulder region, [ ]Scapula region, [ ]Interosseous region,  [ ]thigh region, [ ]Calf region    Estimated Needs:   [x ] no change since previous assessment  [ ] recalculated:     Brief hospital course:   57 y/o M homeless with a PMH of smoker ( quit one month ago), polysubstance abuse ( cocaine , marijuana), COPD, depression recently diagnosed with lung cancer came to the ED BIBEMs from [place] due to respiratory distress.  Pt became unstable, in respiratory distress accompanied with hypertension and tachycardia.  Pt intubated by general anesthesia without event.  A line placed for BP management, Guallpa for strict I/Os in critically ill pt.  Maintaining sedation with propofol and Fentanyl.  S/P b/l bronchial stenting with Dr. Amaya on 8/16 and 8/18.    Current Nutrition Diagnosis:  Pt remains at high nutrition risk secondary to severe (chronic) protein calorie malnutrition related to inadequate protein energy intake in setting of lung ca with history of polysubstance misuse as evidenced by 20# (12%)  weight loss x 1 month, meeting less then 50% estimated energy needs > 5 days and physical signs of moderate muscle mass and fat loss. Pt receiving enteral feeds of Vital 1.5 @ 30ml/hr ([id number]) 600ml/day; 900kcal, 40gm protein; not yet meeting estimated nutrition needs. Recommendations below:       Recommendations:   1. RX: MVI, Thiamine and folic acid daily   2. Check weight daily to monitor trends   3. Increase Vital By 10ml every 6 hours as tolerated to goal rate = 60ml/hr (x20 hours) 1200ml/day; 1800kcal, 80gm protein      Monitoring and Evaluation:   [ ] PO intake [x ] Tolerance to diet prescription [X] Weights  [X] Follow up per protocol [X] Labs:

## 2021-08-19 NOTE — PROGRESS NOTE ADULT - ASSESSMENT
55 y/o M homeless with a PMH of smoker ( quit one month ago), polysubstance abuse ( cocaine , marihuana), COPD, depression recently diagnosed with lung cancer came to the ED BIBEMs from [place] due to respiratory distress.  Pt became unstable, in respiratory distress accompanied with hypertension and tachycardia.  Pt intubated by general anesthesia without event.  A line placed for BP management, Guallpa for strict I/Os in critically ill pt.  Maintaining sedation with propofol and Fentanyl.  S/P b/l bronchial stenting with Dr. Amaya on 8/16 and 8/18.

## 2021-08-20 LAB
ANION GAP SERPL CALC-SCNC: 9 MMOL/L — SIGNIFICANT CHANGE UP (ref 5–17)
BUN SERPL-MCNC: 23.1 MG/DL — HIGH (ref 8–20)
CALCIUM SERPL-MCNC: 9.3 MG/DL — SIGNIFICANT CHANGE UP (ref 8.6–10.2)
CHLORIDE SERPL-SCNC: 100 MMOL/L — SIGNIFICANT CHANGE UP (ref 98–107)
CO2 SERPL-SCNC: 30 MMOL/L — HIGH (ref 22–29)
CREAT SERPL-MCNC: 0.41 MG/DL — LOW (ref 0.5–1.3)
GLUCOSE SERPL-MCNC: 110 MG/DL — HIGH (ref 70–99)
HCT VFR BLD CALC: 31.6 % — LOW (ref 39–50)
HGB BLD-MCNC: 10.1 G/DL — LOW (ref 13–17)
MAGNESIUM SERPL-MCNC: 1.9 MG/DL — SIGNIFICANT CHANGE UP (ref 1.6–2.6)
MCHC RBC-ENTMCNC: 29.1 PG — SIGNIFICANT CHANGE UP (ref 27–34)
MCHC RBC-ENTMCNC: 32 GM/DL — SIGNIFICANT CHANGE UP (ref 32–36)
MCV RBC AUTO: 91.1 FL — SIGNIFICANT CHANGE UP (ref 80–100)
PLATELET # BLD AUTO: 263 K/UL — SIGNIFICANT CHANGE UP (ref 150–400)
POTASSIUM SERPL-MCNC: 3.8 MMOL/L — SIGNIFICANT CHANGE UP (ref 3.5–5.3)
POTASSIUM SERPL-SCNC: 3.8 MMOL/L — SIGNIFICANT CHANGE UP (ref 3.5–5.3)
RBC # BLD: 3.47 M/UL — LOW (ref 4.2–5.8)
RBC # FLD: 15.8 % — HIGH (ref 10.3–14.5)
SODIUM SERPL-SCNC: 139 MMOL/L — SIGNIFICANT CHANGE UP (ref 135–145)
WBC # BLD: 12.43 K/UL — HIGH (ref 3.8–10.5)
WBC # FLD AUTO: 12.43 K/UL — HIGH (ref 3.8–10.5)

## 2021-08-20 PROCEDURE — 71045 X-RAY EXAM CHEST 1 VIEW: CPT | Mod: 26

## 2021-08-20 PROCEDURE — 99232 SBSQ HOSP IP/OBS MODERATE 35: CPT

## 2021-08-20 PROCEDURE — 99221 1ST HOSP IP/OBS SF/LOW 40: CPT | Mod: 25,59

## 2021-08-20 RX ORDER — ACETAMINOPHEN 500 MG
650 TABLET ORAL EVERY 6 HOURS
Refills: 0 | Status: DISCONTINUED | OUTPATIENT
Start: 2021-08-20 | End: 2021-08-27

## 2021-08-20 RX ORDER — POTASSIUM CHLORIDE 20 MEQ
40 PACKET (EA) ORAL ONCE
Refills: 0 | Status: COMPLETED | OUTPATIENT
Start: 2021-08-20 | End: 2021-08-20

## 2021-08-20 RX ORDER — MULTIVIT-MIN/FERROUS GLUCONATE 9 MG/15 ML
1 LIQUID (ML) ORAL DAILY
Refills: 0 | Status: DISCONTINUED | OUTPATIENT
Start: 2021-08-20 | End: 2021-08-27

## 2021-08-20 RX ORDER — PANTOPRAZOLE SODIUM 20 MG/1
40 TABLET, DELAYED RELEASE ORAL
Refills: 0 | Status: DISCONTINUED | OUTPATIENT
Start: 2021-08-21 | End: 2021-08-27

## 2021-08-20 RX ORDER — MAGNESIUM SULFATE 500 MG/ML
2 VIAL (ML) INJECTION ONCE
Refills: 0 | Status: COMPLETED | OUTPATIENT
Start: 2021-08-20 | End: 2021-08-20

## 2021-08-20 RX ORDER — POTASSIUM CHLORIDE 20 MEQ
10 PACKET (EA) ORAL
Refills: 0 | Status: DISCONTINUED | OUTPATIENT
Start: 2021-08-20 | End: 2021-08-20

## 2021-08-20 RX ORDER — ASCORBIC ACID 60 MG
500 TABLET,CHEWABLE ORAL DAILY
Refills: 0 | Status: DISCONTINUED | OUTPATIENT
Start: 2021-08-20 | End: 2021-08-27

## 2021-08-20 RX ADMIN — ENOXAPARIN SODIUM 40 MILLIGRAM(S): 100 INJECTION SUBCUTANEOUS at 11:40

## 2021-08-20 RX ADMIN — Medication 0.2 MILLIGRAM(S): at 06:41

## 2021-08-20 RX ADMIN — Medication 0.2 MILLIGRAM(S): at 21:48

## 2021-08-20 RX ADMIN — Medication 3 MILLILITER(S): at 23:01

## 2021-08-20 RX ADMIN — Medication 1 APPLICATION(S): at 11:41

## 2021-08-20 RX ADMIN — AMIODARONE HYDROCHLORIDE 200 MILLIGRAM(S): 400 TABLET ORAL at 06:41

## 2021-08-20 RX ADMIN — PIPERACILLIN AND TAZOBACTAM 25 GRAM(S): 4; .5 INJECTION, POWDER, LYOPHILIZED, FOR SOLUTION INTRAVENOUS at 06:41

## 2021-08-20 RX ADMIN — Medication 650 MILLIGRAM(S): at 21:56

## 2021-08-20 RX ADMIN — Medication 50 GRAM(S): at 06:41

## 2021-08-20 RX ADMIN — Medication 60 MILLIGRAM(S): at 06:41

## 2021-08-20 RX ADMIN — NICARDIPINE HYDROCHLORIDE 25 MG/HR: 30 CAPSULE, EXTENDED RELEASE ORAL at 00:20

## 2021-08-20 RX ADMIN — RISPERIDONE 1 MILLIGRAM(S): 4 TABLET ORAL at 06:41

## 2021-08-20 RX ADMIN — Medication 0.2 MILLIGRAM(S): at 14:19

## 2021-08-20 RX ADMIN — Medication 60 MILLIGRAM(S): at 14:09

## 2021-08-20 RX ADMIN — Medication 30 MILLIGRAM(S): at 06:42

## 2021-08-20 RX ADMIN — Medication 500 MILLIGRAM(S): at 11:40

## 2021-08-20 RX ADMIN — Medication 1 TABLET(S): at 11:40

## 2021-08-20 RX ADMIN — Medication 650 MILLIGRAM(S): at 22:45

## 2021-08-20 RX ADMIN — PIPERACILLIN AND TAZOBACTAM 25 GRAM(S): 4; .5 INJECTION, POWDER, LYOPHILIZED, FOR SOLUTION INTRAVENOUS at 21:48

## 2021-08-20 RX ADMIN — Medication 30 MILLIGRAM(S): at 00:20

## 2021-08-20 RX ADMIN — Medication 100 MILLIEQUIVALENT(S): at 06:42

## 2021-08-20 RX ADMIN — Medication 3 MILLILITER(S): at 15:36

## 2021-08-20 RX ADMIN — Medication 30 MILLIGRAM(S): at 11:40

## 2021-08-20 RX ADMIN — Medication 40 MILLIEQUIVALENT(S): at 11:39

## 2021-08-20 RX ADMIN — BUDESONIDE AND FORMOTEROL FUMARATE DIHYDRATE 2 PUFF(S): 160; 4.5 AEROSOL RESPIRATORY (INHALATION) at 23:00

## 2021-08-20 RX ADMIN — Medication 30 MILLIGRAM(S): at 06:41

## 2021-08-20 RX ADMIN — Medication 30 MILLIGRAM(S): at 19:26

## 2021-08-20 RX ADMIN — RISPERIDONE 1 MILLIGRAM(S): 4 TABLET ORAL at 18:10

## 2021-08-20 RX ADMIN — Medication 30 MILLIGRAM(S): at 01:00

## 2021-08-20 RX ADMIN — Medication 30 MILLIGRAM(S): at 18:10

## 2021-08-20 RX ADMIN — Medication 3 MILLILITER(S): at 08:36

## 2021-08-20 RX ADMIN — PIPERACILLIN AND TAZOBACTAM 25 GRAM(S): 4; .5 INJECTION, POWDER, LYOPHILIZED, FOR SOLUTION INTRAVENOUS at 14:09

## 2021-08-20 RX ADMIN — NICARDIPINE HYDROCHLORIDE 25 MG/HR: 30 CAPSULE, EXTENDED RELEASE ORAL at 06:41

## 2021-08-20 RX ADMIN — BUDESONIDE AND FORMOTEROL FUMARATE DIHYDRATE 2 PUFF(S): 160; 4.5 AEROSOL RESPIRATORY (INHALATION) at 08:36

## 2021-08-20 RX ADMIN — Medication 60 MILLIGRAM(S): at 21:48

## 2021-08-20 NOTE — BH CONSULTATION LIAISON ASSESSMENT NOTE - NSBHREFERDETAILS_PSY_A_CORE_FT
Major depression; patient made a suicidal statement about wanting to die quick. He reportedly wants to expedite his death after learning he has lung cancer.

## 2021-08-20 NOTE — PROGRESS NOTE ADULT - ASSESSMENT
57 y/o M homeless with a PMH of smoker ( quit one month ago), polysubstance abuse ( cocaine , marihuana), COPD, depression recently diagnosed with lung cancer came to the ED BIBEMs from [place] due to respiratory distress.  Pt became unstable, in respiratory distress accompanied with hypertension and tachycardia.  Pt intubated by general anesthesia without event.  A line placed for BP management, Guallpa for strict I/Os in critically ill pt.  Maintaining sedation with propofol and Fentanyl.  S/P b/l bronchial stenting with Dr. Amaya on 8/16 and 8/18.

## 2021-08-20 NOTE — BH CONSULTATION LIAISON ASSESSMENT NOTE - CURRENT MEDICATION
MEDICATIONS  (STANDING):  albuterol/ipratropium for Nebulization 3 milliLiter(s) Nebulizer every 6 hours  aMIOdarone    Tablet 200 milliGRAM(s) Oral daily  ascorbic acid 500 milliGRAM(s) Oral daily  budesonide 160 MICROgram(s)/formoterol 4.5 MICROgram(s) Inhaler 2 Puff(s) Inhalation two times a day  cloNIDine 0.2 milliGRAM(s) Oral every 8 hours  collagenase Ointment 1 Application(s) Topical daily  enoxaparin Injectable 40 milliGRAM(s) SubCutaneous daily  ketorolac   Injectable 30 milliGRAM(s) IV Push every 6 hours  methylPREDNISolone sodium succinate Injectable 60 milliGRAM(s) IV Push every 8 hours  multivitamin/minerals 1 Tablet(s) Oral daily  piperacillin/tazobactam IVPB.. 3.375 Gram(s) IV Intermittent every 8 hours  risperiDONE   Tablet 1 milliGRAM(s) Oral two times a day    MEDICATIONS  (PRN):  acetaminophen   Tablet .. 650 milliGRAM(s) Oral every 6 hours PRN Mild Pain (1 - 3)

## 2021-08-20 NOTE — PROGRESS NOTE ADULT - ASSESSMENT
57 y/o M homeless with a PMH of smoker (quit one month ago), polysubstance abuse ( cocaine , marihuana), COPD, depression recently diagnosed with lung cancer came to the ED BIBEMs from [place] due to respiratory distress s/p emergent ET intubation and endobronchial stent placement.    # respiratory failure  # occluded RLL bronchus  -required intubation and bronchial stents placement  -extubated last night, now on high flow O2    # lung cancer  -at least locally advanced disease with extensive lung mass, possible stage IV (adrenal mass)  -pt was receiving RT at [facility] with Dr. George. prior to this admission  -ideally pt should follow up at [facility] after discharge from the hospital. However, patient is homeless without social support, consistent followup during treatment could become an issue. He also appears to have underlying psychiatric issues with possible suicidal thoughts. He was irritable and not forthcoming with answering questions.   -initiating chemotherapy in this situation would be unsafe for the patient   -ethic consult for evaluating the possibility of palliative/comfort care  -agree with psychiatric evaluation  -appreciate social work for assessing proper placement          Kade Zuluaga MD  Medical [place] and Hematology  [facility]  [phone]

## 2021-08-20 NOTE — PROGRESS NOTE ADULT - NEGATIVE GASTROINTESTINAL SYMPTOMS
no nausea/no vomiting/no diarrhea/no constipation
no vomiting/no diarrhea/no constipation
no vomiting/no diarrhea
no vomiting/no diarrhea

## 2021-08-20 NOTE — PROGRESS NOTE ADULT - PROBLEM SELECTOR PLAN 1
s/p Lt Bronchial stenting on 8/16 and Rt Bronchial stenting on 8/18 with Dr Amaya.  Extubated yesterday, wean HF NC for 02 sat > 92%   Continue Duonebs, Symbicort, Solumedrol   Maintain Guallpa in critically ill pt, replace lytes PRN  Currently on clears, advance diet as tolerated

## 2021-08-20 NOTE — PROGRESS NOTE ADULT - SUBJECTIVE AND OBJECTIVE BOX
INTERVAL EVENTS:  Pt was extubated yesterday now on high flow oxygen. Awake and alert, answers questions by writing as his throat is hoarse. Irritable mood. Denies any fever, chills, night sweat, CP, SOB, abd pain, constipation, diarrhea, dysuria      Vital Signs Last 24 Hrs  T(C): 36.5 (20 Aug 2021 16:24), Max: 37.2 (20 Aug 2021 06:00)  T(F): 97.7 (20 Aug 2021 16:24), Max: 99 (20 Aug 2021 06:00)  HR: 72 (20 Aug 2021 16:24) (69 - 101)  BP: 138/71 (20 Aug 2021 16:24) (119/63 - 151/73)  BP(mean): 93 (20 Aug 2021 16:24) (85 - 105)  RR: 16 (20 Aug 2021 16:24) (15 - 32)  SpO2: 95% (20 Aug 2021 16:24) (89% - 99%)      PHYSICAL EXAM:   GENERAL: no acute distress  HEENT: EOMI, neck supple  RESPIRATORY: +crackles b/l lung  CARDIOVASCULAR: RRR, no murmurs, gallops, rubs  ABDOMINAL: soft, non-tender, non-distended, positive bowel sounds   EXTREMITIES: no clubbing, cyanosis, or edema  NEUROLOGICAL: alert and oriented x 3, non-focal  SKIN: no rashes or lesions   MUSCULOSKELETAL: no gross joint deformity                          10.1   12.43 )-----------( 263      ( 20 Aug 2021 03:05 )             31.6     08-20    139  |  100  |  23.1<H>  ----------------------------<  110<H>  3.8   |  30.0<H>  |  0.41<L>    Ca    9.3      20 Aug 2021 03:05  Mg     1.9     08-20          MEDICATIONS  (STANDING):  albuterol/ipratropium for Nebulization 3 milliLiter(s) Nebulizer every 6 hours  aMIOdarone    Tablet 200 milliGRAM(s) Oral daily  ascorbic acid 500 milliGRAM(s) Oral daily  budesonide 160 MICROgram(s)/formoterol 4.5 MICROgram(s) Inhaler 2 Puff(s) Inhalation two times a day  cloNIDine 0.2 milliGRAM(s) Oral every 8 hours  collagenase Ointment 1 Application(s) Topical daily  enoxaparin Injectable 40 milliGRAM(s) SubCutaneous daily  ketorolac   Injectable 30 milliGRAM(s) IV Push every 6 hours  methylPREDNISolone sodium succinate Injectable 60 milliGRAM(s) IV Push every 8 hours  multivitamin/minerals 1 Tablet(s) Oral daily  piperacillin/tazobactam IVPB.. 3.375 Gram(s) IV Intermittent every 8 hours  risperiDONE   Tablet 1 milliGRAM(s) Oral two times a day

## 2021-08-20 NOTE — BH CONSULTATION LIAISON ASSESSMENT NOTE - OTHER
Patient is reportedly homeless. Homeless; lacks support network. Estranged to his family. Patient's nurse (Blessing) and his surgeon.

## 2021-08-20 NOTE — BH CONSULTATION LIAISON ASSESSMENT NOTE - SUMMARY
On assessment patient is calm but irritable. He is not able to talk due to hoarseness of his voice. However, he was able to utilize a pen and pad to answer questions. He quickly writes no when ask about a history of suicidal attempts  and current suicidal thoughts. He appears annoyed with the questions related to suicidality. He quickly wrote "no" next to every questions including passive suicidal thoughts, then wrote "goodbye" at the bottom of t he page, and refusing to answer any more questions. Of note, patient appears to be internally preoccupied during this assessment as he was observed talking to himself, but this writer could not make out what he was saying.    IMPRESSION: Patient appears irritable and he is not forthcoming about his current mood given the speed at which he answer the questions. He is not able to reliably contract for safety and will need to remain on 1:1 observation for suicide precaution. Patient reportedly made a suicidal statement to an ICU doctor, stating he wanted to speed up his death. He lacks support network and is estranged to his family.     PLAN:  1. Psychiatric reassessment needed for discharge clearance.  2. Patient may continue on Risperdal 1 mg PO BID.  3. Patient will benefit from psychiatric follow-up to reassess need for 1:1 observation.  4. 1:1 observation for suicidal precaution.

## 2021-08-20 NOTE — PROGRESS NOTE ADULT - SUBJECTIVE AND OBJECTIVE BOX
Subjective:  57yo M extubated yesterday, now on HF NC, stable.      HPI:  55 y/o M homeless with a PMH of smoker ( quit one month ago), polysubstance abuse ( cocaine , marihuana), COPD, depression recently diagnosed with lung cancer came to the ED BIBEMs from [place] due to respiratory distress. Pt reports was diagnosed with lung cancer after an admission on [facility] on 7/7/21 due to suicide attempt. He is s/p right back chest wall mass biopsy on 7/8/21 that showed poorly differentiated carcinoma and EBUS/ bronch by CT surgery was deferred. Pt was recently discharged from [facility] to [place] on 8/6/21.Pt reports since was diagnosed with lung cancer is experiencing progressive SOB. SOB got worse since was d/c from [facility] one week ago. This morning patient wake up with extreme SOB, was gasping for air and EMS called. He endorses associated sweating, chest pain and palpitations during the event. Pt have been experiencing on/off  substernal chest pain and palpitations since was dx with lung cancer. He describes chest pain as pressure like, non radiating and exacerbated with coughing. He endorse cough since approximately one month ago of yellow sputum and tingle blood on it. He states has hemoptysis since 8/2/21  but have been getting better since then. Last episode of hemoptysis was three days ago. He have loss approximately 10-20 pounds since July, 2021.He reports supposed to follow up with hematology- oncology but have not seen  him yet. He has been receiving radiation being the second dose today but missed the appointment due was but missed the appointment due was brought to the hospital. He denies fever, chills, nausea , vomits, leg swelling, orthopnea. Patient had a CT of chest showing an endobronchial lesion. (13 Aug 2021 15:23)          PAST MEDICAL & SURGICAL HISTORY:  Schizophrenia    Bipolar disorder    Depression    Lung cancer    Polysubstance abuse    No significant past surgical history            MEDICATIONS  (STANDING):  albuterol/ipratropium for Nebulization 3 milliLiter(s) Nebulizer every 6 hours  aMIOdarone    Tablet 200 milliGRAM(s) Oral daily  budesonide 160 MICROgram(s)/formoterol 4.5 MICROgram(s) Inhaler 2 Puff(s) Inhalation two times a day  cloNIDine 0.2 milliGRAM(s) Oral every 8 hours  collagenase Ointment 1 Application(s) Topical daily  dexMEDEtomidine Infusion 0.2 MICROgram(s)/kG/Hr (3.15 mL/Hr) IV Continuous <Continuous>  enoxaparin Injectable 40 milliGRAM(s) SubCutaneous daily  fentaNYL   Infusion. 0.5 MICROgram(s)/kG/Hr (3.15 mL/Hr) IV Continuous <Continuous>  ketorolac   Injectable 30 milliGRAM(s) IV Push every 6 hours  methylPREDNISolone sodium succinate Injectable 60 milliGRAM(s) IV Push every 8 hours  niCARdipine Infusion 5 mG/Hr (25 mL/Hr) IV Continuous <Continuous>  pantoprazole  Injectable 40 milliGRAM(s) IV Push daily  piperacillin/tazobactam IVPB.. 3.375 Gram(s) IV Intermittent every 8 hours  risperiDONE   Tablet 1 milliGRAM(s) Oral two times a day    MEDICATIONS  (PRN):  fentaNYL    Injectable 50 MICROGram(s) IV Push every 2 hours PRN Severe Pain (7 - 10)          Allergies    Allergy Status Unknown    Intolerances    paper plates/tray and plastic utensils only (Unknown)        WEIGHTS:  Daily     Daily   Admit Wt: Drug Dosing Weight  Height (cm): 182.9 (17 Aug 2021 15:57)  Weight (kg): 63 (17 Aug 2021 15:57)  BMI (kg/m2): 18.8 (17 Aug 2021 15:57)  BSA (m2): 1.82 (17 Aug 2021 15:57)  I&O's Summary    18 Aug 2021 07:01  -  19 Aug 2021 07:00  --------------------------------------------------------  IN: 2218.3 mL / OUT: 1925 mL / NET: 293.3 mL    19 Aug 2021 07:01  -  20 Aug 2021 03:58  --------------------------------------------------------  IN: 1205 mL / OUT: 3120 mL / NET: -1915 mL        VITAL SIGNS:  ICU Vital Signs Last 24 Hrs  T(C): 36.5 (20 Aug 2021 00:50), Max: 36.6 (20 Aug 2021 00:00)  T(F): 97.7 (20 Aug 2021 00:50), Max: 97.9 (20 Aug 2021 00:00)  HR: 75 (20 Aug 2021 03:00) (58 - 127)  BP: --  BP(mean): --  ABP: 128/76 (20 Aug 2021 03:00) (80/58 - 171/81)  ABP(mean): 96 (20 Aug 2021 03:00) (64 - 117)  RR: 19 (20 Aug 2021 03:00) (13 - 38)  SpO2: 95% (20 Aug 2021 03:00) (92% - 99%)        All laboratory results, radiology and medications reviewed.    LABS:  08-20    139  |  100  |  23.1<H>  ----------------------------<  110<H>  3.8   |  30.0<H>  |  0.41<L>    Ca    9.3      20 Aug 2021 03:05  Mg     1.9     08-20                                   10.1   12.43 )-----------( 263      ( 20 Aug 2021 03:05 )             31.6              ABG - ( 19 Aug 2021 12:46 )  pH, Arterial: 7.500 pH, Blood: x     /  pCO2: 44    /  pO2: 97    / HCO3: 34    / Base Excess: 11.1  /  SaO2: 99.0                  CAPILLARY BLOOD GLUCOSE                 PHYSICAL EXAM:  General:  no acute distress  Neurology:  alert and oriented X 4, nonfocal, no gross deficits  Respiratory:  course to auscultation bilaterally  CV:  A Fib, rate controlled  Abdomen:  soft, nontender, nondistended, positive bowel sounds  Extremities:  warm, well perfused, no edema +DP pulses
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
## 2021-08-21 DIAGNOSIS — F32.9 MAJOR DEPRESSIVE DISORDER, SINGLE EPISODE, UNSPECIFIED: ICD-10-CM

## 2021-08-21 DIAGNOSIS — R45.851 SUICIDAL IDEATIONS: ICD-10-CM

## 2021-08-21 PROCEDURE — 99232 SBSQ HOSP IP/OBS MODERATE 35: CPT

## 2021-08-21 PROCEDURE — 93010 ELECTROCARDIOGRAM REPORT: CPT

## 2021-08-21 RX ADMIN — Medication 650 MILLIGRAM(S): at 21:30

## 2021-08-21 RX ADMIN — Medication 1 TABLET(S): at 12:55

## 2021-08-21 RX ADMIN — AMIODARONE HYDROCHLORIDE 200 MILLIGRAM(S): 400 TABLET ORAL at 09:26

## 2021-08-21 RX ADMIN — Medication 650 MILLIGRAM(S): at 19:56

## 2021-08-21 RX ADMIN — PIPERACILLIN AND TAZOBACTAM 25 GRAM(S): 4; .5 INJECTION, POWDER, LYOPHILIZED, FOR SOLUTION INTRAVENOUS at 05:31

## 2021-08-21 RX ADMIN — Medication 3 MILLILITER(S): at 15:00

## 2021-08-21 RX ADMIN — Medication 3 MILLILITER(S): at 04:04

## 2021-08-21 RX ADMIN — Medication 1 APPLICATION(S): at 12:55

## 2021-08-21 RX ADMIN — BUDESONIDE AND FORMOTEROL FUMARATE DIHYDRATE 2 PUFF(S): 160; 4.5 AEROSOL RESPIRATORY (INHALATION) at 10:56

## 2021-08-21 RX ADMIN — BUDESONIDE AND FORMOTEROL FUMARATE DIHYDRATE 2 PUFF(S): 160; 4.5 AEROSOL RESPIRATORY (INHALATION) at 20:10

## 2021-08-21 RX ADMIN — Medication 3 MILLILITER(S): at 10:56

## 2021-08-21 RX ADMIN — Medication 60 MILLIGRAM(S): at 05:31

## 2021-08-21 RX ADMIN — PIPERACILLIN AND TAZOBACTAM 25 GRAM(S): 4; .5 INJECTION, POWDER, LYOPHILIZED, FOR SOLUTION INTRAVENOUS at 21:10

## 2021-08-21 RX ADMIN — Medication 0.2 MILLIGRAM(S): at 21:10

## 2021-08-21 RX ADMIN — Medication 500 MILLIGRAM(S): at 12:55

## 2021-08-21 RX ADMIN — Medication 3 MILLILITER(S): at 20:10

## 2021-08-21 RX ADMIN — Medication 60 MILLIGRAM(S): at 21:10

## 2021-08-21 NOTE — BH CONSULTATION LIAISON PROGRESS NOTE - NSBHCHARTREVIEWLAB_PSY_A_CORE FT
Basic Metabolic Panel in AM (08.20.21 @ 03:05)   Sodium, Serum: 139 mmol/L   Potassium, Serum: 3.8 mmol/L   Chloride, Serum: 100 mmol/L   Carbon Dioxide, Serum: 30.0 mmol/L   Anion Gap, Serum: 9 mmol/L   Blood Urea Nitrogen, Serum: 23.1 mg/dL   Creatinine, Serum: 0.41 mg/dL   Glucose, Serum: 110 mg/dL   Calcium, Total Serum: 9.3 mg/dL   eGFR if Non : 132: Interpretative comment   The units for eGFR are mL/min/1.73M2 (normalized body surface area). The

## 2021-08-21 NOTE — PROGRESS NOTE ADULT - PROBLEM SELECTOR PLAN 1
s/p Lt Bronchial stenting on 8/16 and Rt Bronchial stenting on 8/18 with Dr Amaya.  Now on nasal O2.  Continue Duonebs, Symbicort, Solumedrol   Tolerating diet.  Oncology follow up appreciated.
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
## 2021-08-21 NOTE — PROGRESS NOTE ADULT - PROBLEM SELECTOR PLAN 5
Seen by psych yesterday.  Placed on 1:1 for suicidal ideation.  Agitated today and abusive towards staff.  Threatening to throw his full urinal at staff.  Demanding he gets his pocket knife back.    Behavioral health called regarding need for patient follow up today for recommendations.    Pt refused his Risperdal this AM per RN.

## 2021-08-21 NOTE — BH CONSULTATION LIAISON PROGRESS NOTE - NSBHCHARTREVIEWINVESTIGATE_PSY_A_CORE FT
Ventricular Rate 115 BPM    Atrial Rate 89 BPM    QRS Duration 92 ms    Q-T Interval 308 ms    QTC Calculation(Bazett) 426 ms    R Axis 42 degrees    T Axis 40 degrees    Diagnosis Line Atrial fibrillation with rapid ventricular response  Nonspecific ST and T wave abnormality  Abnormal ECG    Confirmed by Espinoza Maradiaga ([id number]) on 8/19/2021 8:56:22 PM

## 2021-08-21 NOTE — BH CONSULTATION LIAISON PROGRESS NOTE - CURRENT MEDICATION
MEDICATIONS  (STANDING):  albuterol/ipratropium for Nebulization 3 milliLiter(s) Nebulizer every 6 hours  aMIOdarone    Tablet 200 milliGRAM(s) Oral daily  ascorbic acid 500 milliGRAM(s) Oral daily  budesonide 160 MICROgram(s)/formoterol 4.5 MICROgram(s) Inhaler 2 Puff(s) Inhalation two times a day  cloNIDine 0.2 milliGRAM(s) Oral every 8 hours  collagenase Ointment 1 Application(s) Topical daily  enoxaparin Injectable 40 milliGRAM(s) SubCutaneous daily  methylPREDNISolone sodium succinate Injectable 60 milliGRAM(s) IV Push every 8 hours  multivitamin/minerals 1 Tablet(s) Oral daily  pantoprazole    Tablet 40 milliGRAM(s) Oral before breakfast  piperacillin/tazobactam IVPB.. 3.375 Gram(s) IV Intermittent every 8 hours  risperiDONE   Tablet 1 milliGRAM(s) Oral two times a day    MEDICATIONS  (PRN):  acetaminophen   Tablet .. 650 milliGRAM(s) Oral every 6 hours PRN Mild Pain (1 - 3)
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
## 2021-08-21 NOTE — PROGRESS NOTE ADULT - ASSESSMENT
57 y/o M homeless with a PMH of smoker ( quit one month ago), polysubstance abuse ( cocaine , marihuana), COPD, depression recently diagnosed with lung cancer came to the ED BIBEMs from [place] due to respiratory distress.  Pt became unstable, in respiratory distress accompanied with hypertension and tachycardia.  Pt intubated by general anesthesia without event.  A line placed for BP management, Gu[place]pa for strict I/Os in critically ill pt.  Maintaining sedation with propofol and Fentanyl.  S/P b/l bronchial stenting with Dr. Amaya on 8/16 and 8/18.

## 2021-08-21 NOTE — BH CONSULTATION LIAISON PROGRESS NOTE - NSBHCHARTREVIEWVS_PSY_A_CORE FT
Vital Signs Last 24 Hrs  T(C): 36.6 (21 Aug 2021 11:55), Max: 36.6 (20 Aug 2021 21:46)  T(F): 97.9 (21 Aug 2021 11:55), Max: 97.9 (20 Aug 2021 21:46)  HR: 93 (21 Aug 2021 15:04) (71 - 111)  BP: 153/49 (21 Aug 2021 11:55) (138/71 - 161/82)  BP(mean): 93 (20 Aug 2021 16:24) (93 - 93)  RR: 18 (21 Aug 2021 11:55) (16 - 18)  SpO2: 95% (21 Aug 2021 15:04) (91% - 96%)

## 2021-08-21 NOTE — PROGRESS NOTE ADULT - SUBJECTIVE AND OBJECTIVE BOX
Subjective: Pt lying in bed.  Agitated and verbally abusive towards staff.  "I want my pocket knife back".  NAD noted.      Tele:  SR                          T(F): 97.9 (21 @ 21:46), Max: 98 (21 @ 13:39)  HR: 82 (21 @ 05:29) (69 - 87)  BP: 161/82 (21 @ 05:29) (137/69 - 161/82)  RR: 18 (21 @ 05:29) (16 - 32)  SpO2: 93% (21 @ 05:29) (91% - 96%)      Daily     Daily Weight in k.6 (21 Aug 2021 04:50)    LV EF: 55%    Allergy Status Unknown  paper plates/tray and plastic utensils only (Unknown)  PAPER TRAY AND PLASTIC UTENSILS ONLY (Unknown)          139  |  100  |  23.1<H>  ----------------------------<  110<H>  3.8   |  30.0<H>  |  0.41<L>    Ca    9.3      20 Aug 2021 03:05  Mg     1.9                                      10.1   12.43 )-----------( 263      ( 20 Aug 2021 03:05 )             31.6               CXR: < from: Xray Chest 1 View- PORTABLE-Routine (Xray Chest 1 View- PORTABLE-Routine in AM.) (21 @ 05:19) >     EXAM:  XR CHEST PORTABLE ROUTINE 1V                          PROCEDURE DATE:  2021        INTERPRETATION:  Clinical history: 56-year-old male, daily film.    Two views of the chest are compared to 2021 and demonstrate that the ET and NG tubes have been removed. Left IJ line with the tip in the SVC and no pneumothorax, unchanged.    Cardiac silhouette and pulmonary vasculature are within normal limits with no lobar consolidation, pneumothorax or acute osseous finding.    Atelectasis/effusion at the left base, unchanged.    Three cm right lower lobe mass, unchanged.    IMPRESSION:  ET and NG tubes removed.    Effusion/atelectasis at the left base and 3 cm right lower lobe mass, unchanged    --- End of Report ---    PERRY JANE DO; Attending Radiologist  This document has been electronically signed. Aug 20 2021  2:02PM    < end of copied text >      I&O's Detail    20 Aug 2021 07:01  -  21 Aug 2021 07:00  --------------------------------------------------------  IN:    IV PiggyBack: 100 mL    IV PiggyBack: 50 mL    Oral Fluid: 240 mL  Total IN: 390 mL    OUT:    Dexmedetomidine: 0 mL    Indwelling Catheter - Urethral (mL): 725 mL    NiCARdipine: 0 mL    Voided (mL): 1900 mL  Total OUT: 2625 mL    Total NET: -2235 mL      Active Medications:  acetaminophen   Tablet .. 650 milliGRAM(s) Oral every 6 hours PRN  albuterol/ipratropium for Nebulization 3 milliLiter(s) Nebulizer every 6 hours  aMIOdarone    Tablet 200 milliGRAM(s) Oral daily  ascorbic acid 500 milliGRAM(s) Oral daily  budesonide 160 MICROgram(s)/formoterol 4.5 MICROgram(s) Inhaler 2 Puff(s) Inhalation two times a day  cloNIDine 0.2 milliGRAM(s) Oral every 8 hours  collagenase Ointment 1 Application(s) Topical daily  enoxaparin Injectable 40 milliGRAM(s) SubCutaneous daily  methylPREDNISolone sodium succinate Injectable 60 milliGRAM(s) IV Push every 8 hours  multivitamin/minerals 1 Tablet(s) Oral daily  pantoprazole    Tablet 40 milliGRAM(s) Oral before breakfast  piperacillin/tazobactam IVPB.. 3.375 Gram(s) IV Intermittent every 8 hours  risperiDONE   Tablet 1 milliGRAM(s) Oral two times a day      Physical Exam:  **** Exam deferred with pt agitation and refusal****    Neuro: AAOX3.  agitated and being verbally abusive    Pulm:     CV:     Abd:                      PAST MEDICAL & SURGICAL HISTORY:  Schizophrenia    Bipolar disorder    Depression    Lung cancer    Polysubstance abuse    No significant past surgical history

## 2021-08-22 PROCEDURE — 99232 SBSQ HOSP IP/OBS MODERATE 35: CPT

## 2021-08-22 PROCEDURE — 71045 X-RAY EXAM CHEST 1 VIEW: CPT | Mod: 26

## 2021-08-22 RX ORDER — HALOPERIDOL DECANOATE 100 MG/ML
5 INJECTION INTRAMUSCULAR ONCE
Refills: 0 | Status: COMPLETED | OUTPATIENT
Start: 2021-08-22 | End: 2021-08-22

## 2021-08-22 RX ADMIN — Medication 3 MILLILITER(S): at 02:27

## 2021-08-22 RX ADMIN — BUDESONIDE AND FORMOTEROL FUMARATE DIHYDRATE 2 PUFF(S): 160; 4.5 AEROSOL RESPIRATORY (INHALATION) at 20:44

## 2021-08-22 RX ADMIN — HALOPERIDOL DECANOATE 5 MILLIGRAM(S): 100 INJECTION INTRAMUSCULAR at 13:49

## 2021-08-22 RX ADMIN — Medication 3 MILLILITER(S): at 09:20

## 2021-08-22 RX ADMIN — Medication 3 MILLILITER(S): at 20:43

## 2021-08-22 RX ADMIN — Medication 2 MILLIGRAM(S): at 13:49

## 2021-08-22 RX ADMIN — BUDESONIDE AND FORMOTEROL FUMARATE DIHYDRATE 2 PUFF(S): 160; 4.5 AEROSOL RESPIRATORY (INHALATION) at 09:20

## 2021-08-22 RX ADMIN — Medication 2 MILLIGRAM(S): at 14:47

## 2021-08-22 NOTE — PROGRESS NOTE ADULT - ASSESSMENT
56M homeless with a PMH of smoker (quit July 2021), polysubstance abuse (cocaine, marijuana), COPD, depression, recently diagnosed with lung cancer, has been receiving RT at [facility]. He was discharge from [facility] to [facility] 8/6/21. He then presented to  8/13 from [facility] due to respiratory distress requiring intubation. HE was found to have an endobronchial mass and was then transferred to [facility] for further care. He underwent FB and left mainstem bronchial stenting with Dr. Amaya on 8/16, then RTOR for right mainstem stent 8/18. Inpatient course has been complicated by suicidal ideations requiring constant observation,  consulted. Otherwise, he was seen by Heme/Onc who requested palliative care given patient is likely not a candidate for chemotherapy due to social issues.

## 2021-08-22 NOTE — PROGRESS NOTE ADULT - SUBJECTIVE AND OBJECTIVE BOX
Subjective: "I feel fine."  Patient denies acute pain with radiating or aggravating factors.  He denies chest pain, shortness of breath, palpitations, headache, dizziness, nausea, or vomiting.     Vital Signs:  Vital Signs Last 24 Hrs  T(C): 36.8 (08-21-21 @ 21:01), Max: 36.8 (08-21-21 @ 21:01)  T(F): 98.3 (08-21-21 @ 21:01), Max: 98.3 (08-21-21 @ 21:01)  HR: 90 (08-22-21 @ 02:27) (82 - 111)  BP: 124/81 (08-21-21 @ 21:01) (124/81 - 161/82)  RR: 18 (08-21-21 @ 21:01) (18 - 18)  SpO2: 95% (08-22-21 @ 02:27) (91% - 97%) on (O2)    Telemetry/Alarms: NSR    Relevant labs, radiology and Medications reviewed    Pertinent Physical Exam  General: Well appearing, NAD  Neuro: AxO x3, non-focal, NAGEL  Cardiac: S1S2, no murmurs  Pulm: CTA b/l, no wheezing or rales  Abdomen: Soft, NT, ND, normoactive BS  Peripheral: +DP pulses b/l, no peripheral edema     08-20 @ 07:01  -  08-21 @ 07:00  --------------------------------------------------------  IN:    IV PiggyBack: 100 mL    IV PiggyBack: 50 mL    Oral Fluid: 240 mL  Total IN: 390 mL    OUT:    Dexmedetomidine: 0 mL    Indwelling Catheter - Urethral (mL): 725 mL    NiCARdipine: 0 mL    Voided (mL): 1900 mL  Total OUT: 2625 mL    Total NET: -2235 mL

## 2021-08-22 NOTE — PROGRESS NOTE ADULT - PROBLEM SELECTOR PLAN 1
s/p Left Bronchial stenting on 8/16 and Rt Bronchial stenting on 8/18 with Dr Amaya.  Now on nasal O2.  Continue Duonebs and Symbicort  Discuss weaning Solumedrol   Tolerating diet.  Oncology follow up appreciated - consider palliative care -ideally pt should follow up at [facility] after discharge from the hospital. However, patient is homeless without social support, consistent followup during treatment could become an issue. Initiating chemotherapy in this situation would be unsafe for the patient

## 2021-08-22 NOTE — PROGRESS NOTE ADULT - PROBLEM SELECTOR PLAN 5
Seen by psych 8/20  Placed on 1:1 for suicidal ideation  Most recent eval 8/21 patient no longer at risk  Agitated today and abusive towards staff.  Threatening to throw his full urinal at staff.  Demanding he gets his pocket knife back.  - constant observation kept in place  Pt refusing medications at times

## 2021-08-23 LAB — SARS-COV-2 RNA SPEC QL NAA+PROBE: SIGNIFICANT CHANGE UP

## 2021-08-23 PROCEDURE — 71045 X-RAY EXAM CHEST 1 VIEW: CPT | Mod: 26

## 2021-08-23 PROCEDURE — 99231 SBSQ HOSP IP/OBS SF/LOW 25: CPT

## 2021-08-23 RX ADMIN — BUDESONIDE AND FORMOTEROL FUMARATE DIHYDRATE 2 PUFF(S): 160; 4.5 AEROSOL RESPIRATORY (INHALATION) at 21:26

## 2021-08-23 RX ADMIN — PIPERACILLIN AND TAZOBACTAM 25 GRAM(S): 4; .5 INJECTION, POWDER, LYOPHILIZED, FOR SOLUTION INTRAVENOUS at 05:49

## 2021-08-23 RX ADMIN — AMIODARONE HYDROCHLORIDE 200 MILLIGRAM(S): 400 TABLET ORAL at 05:48

## 2021-08-23 RX ADMIN — Medication 0.2 MILLIGRAM(S): at 15:00

## 2021-08-23 RX ADMIN — Medication 1 TABLET(S): at 15:00

## 2021-08-23 RX ADMIN — RISPERIDONE 1 MILLIGRAM(S): 4 TABLET ORAL at 05:49

## 2021-08-23 RX ADMIN — Medication 500 MILLIGRAM(S): at 15:00

## 2021-08-23 RX ADMIN — Medication 650 MILLIGRAM(S): at 15:02

## 2021-08-23 RX ADMIN — Medication 1 APPLICATION(S): at 15:00

## 2021-08-23 RX ADMIN — PANTOPRAZOLE SODIUM 40 MILLIGRAM(S): 20 TABLET, DELAYED RELEASE ORAL at 05:48

## 2021-08-23 RX ADMIN — Medication 30 MILLIGRAM(S): at 05:49

## 2021-08-23 RX ADMIN — Medication 650 MILLIGRAM(S): at 15:45

## 2021-08-23 RX ADMIN — Medication 3 MILLILITER(S): at 21:27

## 2021-08-23 RX ADMIN — Medication 3 MILLILITER(S): at 14:12

## 2021-08-23 RX ADMIN — BUDESONIDE AND FORMOTEROL FUMARATE DIHYDRATE 2 PUFF(S): 160; 4.5 AEROSOL RESPIRATORY (INHALATION) at 09:04

## 2021-08-23 RX ADMIN — Medication 3 MILLILITER(S): at 09:03

## 2021-08-23 NOTE — PROGRESS NOTE ADULT - PROBLEM SELECTOR PLAN 5
Seen by psych 8/20  Placed on 1:1 for suicidal ideation  Most recent eval 8/21 patient no longer at risk  Agitated 8/22 and abusive towards staff.  Threatening to throw his full urinal at staff.  Demanding he gets his pocket knife back.  - constant observation kept in place  8/23 pt calm and appropriate restraints d/c'd and patient place in chair, no complaints at this time. Seen by psych 8/20  Placed on 1:1 for suicidal ideation  Most recent eval 8/21 patient no longer at risk  Agitated 8/22 and abusive towards staff.  Threatening to throw his full urinal at staff.  Demanding he gets his pocket knife back.  - constant observation kept in place  8/23 pt calm and restraints d/c'd and patient place in chair, Pt however refusing to have another IV placed. Pt understands he is getting antibiotic and maintains his refusal for any needles/ IVs

## 2021-08-23 NOTE — CHART NOTE - NSCHARTNOTEFT_GEN_A_CORE
Pt. Left 20 G IV leaking, pt refusing to have another IV placed pt explain the need for IV ABX, pt still adamantly refusing saying" I am tired of needles, and this place, I just want to be left alone.  Discussed refusal for abx and medications with Jose Luis Saha.   BH following

## 2021-08-23 NOTE — PROGRESS NOTE ADULT - ASSESSMENT
56M homeless with a PMH of smoker (quit July 2021), polysubstance abuse (cocaine, marijuana), COPD, depression, recently diagnosed with lung cancer, has been receiving RT at [facility]. He was discharge from [facility] to [facility] 8/6/21. He then presented to  8/13 from [facility] due to respiratory distress requiring intubation. HE was found to have an endobronchial mass and was then transferred to [facility] for further care. He underwent FB and left mainstem bronchial stenting with Dr. Amaya on 8/16, then RTOR for right mainstem stent 8/18. Inpatient course has been complicated by suicidal ideations requiring constant observation,  consulted. Otherwise, he was seen by Heme/Onc who requested palliative care given patient is likely not a candidate for chemotherapy due to social issues.

## 2021-08-23 NOTE — PROGRESS NOTE ADULT - PROBLEM SELECTOR PLAN 1
s/p Left Bronchial stenting on 8/16 and Rt Bronchial stenting on 8/18 with Dr Amaya.  Now on nasal O2.  Continue Duonebs and Symbicort  Solumedrol 30 BID   Tolerating diet.  Oncology follow up appreciated - palliative care consult ordered   -ideally pt should follow up at [facility] after discharge from the hospital. However, patient is homeless without social support, consistent followup during treatment could become an issue. Initiating chemotherapy in this situation would be unsafe for the patient  Dispo ALYX   COVID pending   Pt off restraints and 1:1 observation

## 2021-08-23 NOTE — PHYSICAL THERAPY INITIAL EVALUATION ADULT - MUSCLE TONE ASSESSMENT, REHAB EVAL
Chris - can you schedule RUQ US and paulo Ordonez - can you send this progress note to Dr.Jenny Muse at [facility]. I cant find her in the system to send her a letter
left-side extremities/right-side extremities/normal

## 2021-08-23 NOTE — PROGRESS NOTE ADULT - SUBJECTIVE AND OBJECTIVE BOX
SUBJECTIVE:  Pt in bed alert and oriented X3, pt hypophonic, pt states, "I am ok, I was a hot head yesterday"  pt c/o of some back pain, but denies SOB, chest pain, palpitations     Overnight events:  none    PAST MEDICAL & SURGICAL HISTORY:  Schizophrenia    Bipolar disorder    Depression    Lung cancer    Polysubstance abuse    No significant past surgical history        MEDICATIONS  acetaminophen   Tablet .. 650 milliGRAM(s) Oral every 6 hours PRN  albuterol/ipratropium for Nebulization 3 milliLiter(s) Nebulizer every 6 hours  aMIOdarone    Tablet 200 milliGRAM(s) Oral daily  ascorbic acid 500 milliGRAM(s) Oral daily  budesonide 160 MICROgram(s)/formoterol 4.5 MICROgram(s) Inhaler 2 Puff(s) Inhalation two times a day  cloNIDine 0.2 milliGRAM(s) Oral every 8 hours  collagenase Ointment 1 Application(s) Topical daily  enoxaparin Injectable 40 milliGRAM(s) SubCutaneous daily  methylPREDNISolone sodium succinate Injectable 30 milliGRAM(s) IV Push two times a day  multivitamin/minerals 1 Tablet(s) Oral daily  pantoprazole    Tablet 40 milliGRAM(s) Oral before breakfast  piperacillin/tazobactam IVPB.. 3.375 Gram(s) IV Intermittent every 8 hours  risperiDONE   Tablet 1 milliGRAM(s) Oral two times a day  MEDICATIONS  (PRN):  acetaminophen   Tablet .. 650 milliGRAM(s) Oral every 6 hours PRN Mild Pain (1 - 3)      Daily         Objective:  T(C): 37.6 (08-23-21 @ 05:47), Max: 37.6 (08-23-21 @ 05:47)  HR: 90 (08-23-21 @ 05:47) (78 - 101)  BP: 117/75 (08-23-21 @ 05:47) (117/75 - 135/82)  RR: 17 (08-23-21 @ 05:47) (17 - 18)  SpO2: 95% (08-23-21 @ 05:47) (92% - 97%)  Wt(kg): --CAPILLARY BLOOD GLUCOSE      I&O's Summary    PHYSICAL EXAM   General: NAD  Neuro: AxO x3, non-focal, NAGEL  Cardiac: S1S2, no murmurs  Pulm: b/l Rhonchi no wheezing or rales  Abdomen: Soft, NT, ND, normoactive BS  Peripheral: +DP pulses b/l, no peripheral edema       Imaging:  CXR:      < from: Xray Chest 1 View- PORTABLE-Routine (Xray Chest 1 View- PORTABLE-Routine in AM.) (08.22.21 @ 06:05) >  FINDINGS:    Single frontal viewof the chest demonstrates 3 cm right lower lobe round mass. Left lower lobe infiltrate/atelectasis, unchanged. No large pneumothorax. The cardiomediastinal silhouette is normal. No acute osseous abnormalities. Overlying EKG leads and wires are noted    IMPRESSION: No interval change.    --- End of Report ---    < end of copied text >      ECG:  < from: 12 Lead ECG (08.19.21 @ 12:57) >    Ventricular Rate 115 BPM    Atrial Rate 89 BPM    QRS Duration 92 ms    Q-T Interval 308 ms    QTC Calculation(Bazett) 426 ms    R Axis 42 degrees    T Axis 40 degrees    Diagnosis Line Atrial fibrillation with rapid ventricular response  Nonspecific ST and T wave abnormality  Abnormal ECG    < end of copied text >

## 2021-08-24 PROCEDURE — 99223 1ST HOSP IP/OBS HIGH 75: CPT

## 2021-08-24 PROCEDURE — 71045 X-RAY EXAM CHEST 1 VIEW: CPT | Mod: 26

## 2021-08-24 PROCEDURE — 99231 SBSQ HOSP IP/OBS SF/LOW 25: CPT

## 2021-08-24 RX ADMIN — Medication 0.2 MILLIGRAM(S): at 13:23

## 2021-08-24 RX ADMIN — Medication 1 TABLET(S): at 17:39

## 2021-08-24 RX ADMIN — Medication 650 MILLIGRAM(S): at 14:20

## 2021-08-24 RX ADMIN — Medication 1 TABLET(S): at 13:23

## 2021-08-24 RX ADMIN — Medication 40 MILLIGRAM(S): at 06:57

## 2021-08-24 RX ADMIN — Medication 650 MILLIGRAM(S): at 21:34

## 2021-08-24 RX ADMIN — Medication 3 MILLILITER(S): at 21:08

## 2021-08-24 RX ADMIN — Medication 3 MILLILITER(S): at 08:34

## 2021-08-24 RX ADMIN — Medication 3 MILLILITER(S): at 15:15

## 2021-08-24 RX ADMIN — Medication 500 MILLIGRAM(S): at 13:23

## 2021-08-24 RX ADMIN — BUDESONIDE AND FORMOTEROL FUMARATE DIHYDRATE 2 PUFF(S): 160; 4.5 AEROSOL RESPIRATORY (INHALATION) at 08:38

## 2021-08-24 RX ADMIN — Medication 650 MILLIGRAM(S): at 05:47

## 2021-08-24 RX ADMIN — Medication 1 TABLET(S): at 06:57

## 2021-08-24 RX ADMIN — AMIODARONE HYDROCHLORIDE 200 MILLIGRAM(S): 400 TABLET ORAL at 05:48

## 2021-08-24 RX ADMIN — Medication 1 APPLICATION(S): at 13:23

## 2021-08-24 RX ADMIN — Medication 0.2 MILLIGRAM(S): at 21:34

## 2021-08-24 RX ADMIN — Medication 3 MILLILITER(S): at 02:46

## 2021-08-24 RX ADMIN — BUDESONIDE AND FORMOTEROL FUMARATE DIHYDRATE 2 PUFF(S): 160; 4.5 AEROSOL RESPIRATORY (INHALATION) at 21:09

## 2021-08-24 RX ADMIN — Medication 650 MILLIGRAM(S): at 06:15

## 2021-08-24 RX ADMIN — PANTOPRAZOLE SODIUM 40 MILLIGRAM(S): 20 TABLET, DELAYED RELEASE ORAL at 05:48

## 2021-08-24 RX ADMIN — Medication 650 MILLIGRAM(S): at 13:23

## 2021-08-24 NOTE — CHART NOTE - NSCHARTNOTEFT_GEN_A_CORE
Ethics consult initiated.    I met with patient today and he was reasonable and has insight into his illness and his treatment options. He knows where he is and why he is here and the date. He understands the poor prognosis of his lung cancer explaining he was told he would live 6 months without treatment and 6mos-2 yrs with treatment. He expressed the will to live and he would like to pursue any option offered him that will help him live including chemotherapy and radiation if those were offered. He portrayed capacity for decision making.   He also is agreeable to go to a [facility] facility upon discharge, he just expressed not wanting to go back to [place]. Further discussions regarding the treatment options are pending.    We also discussed that he is very afraid of needles and he has had many needle pokes recently, showing me his arms with multiple bruises and scabs, and that's why he doesn't want any more IV's. I asked him if the team felt it was better to give him IV medications versus oral ones, i.e. for an infection, if he would consider a more permanent kind of line that goes into a larger vein and doesn't have to be changed as often, and he said he would need to discuss it with the doctors and get more information and that he would need to think about it.    Discussed with Dr. Alee Cassidy, Ethicist. Full consult note to follow. Ethics consult initiated.    I met with patient today and he was reasonable and has insight into his illness and his treatment options. He knows where he is and why he is here and the date. He understands the poor prognosis of his lung cancer explaining he was told he would live 6 months without treatment and 6mos-2 yrs with treatment. He expressed the will to live and he would like to pursue any option offered him that will help him live including chemotherapy and radiation if those were offered. He demonstrated capacity for decision making.     He also is agreeable to go to a [facility] facility upon discharge, he just expressed not wanting to go back to [place]. Further discussions regarding the treatment options are pending.    We also discussed that he is very afraid of needles and he has had many needle pokes recently, showing me his arms with multiple bruises and scabs, and that's why he doesn't want any more IV's. I asked him if the team felt it was better to give him IV medications versus oral ones, i.e. for an infection, if he would consider a more permanent kind of line that goes into a larger vein and doesn't have to be changed as often, and he said he would need to discuss it with the doctors and get more information and that he would need to think about it.    Discussed with Dr. Alee Cassidy, Ethicist. Full consult note to follow.

## 2021-08-24 NOTE — CONSULT NOTE ADULT - ASSESSMENT
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 Palliative consulted for support and goc.     PLAN    Endobronchial Mass  Stage 4 Lung Cancer  - diagnosed 7/2021 and s/p RT with Dr. George at [facility]  - s/p bronch and stent placement this admission  - appreciate oncology input, poor candidate for systemic treatment given homeless, no support system and concern for compliance with tx regimen  - seen by rad onc in house, no RT while hospitalized, rec F/U with Dr. George  - overall long term prognosis is poor  - CTS to follow up with Dr. George regarding if any further palliative treatment is being offered vs. hospice  - pt has indicated wishes to continue RT and treatment if offered    Dyspnea  Possible Post-Obtructive PNA  - O2 supplement PRN, bronchodilator, corticosteroids, antibiotic    Schizophrenia/Bipolar Disorder  - behaviour health following given concerns for suicidal ideation  - on risperidone     Advance Care Planning  - pt is estranged from family and per chart review Palliative NP note (8/13) at  contact number for sister was obtained. Sister indicated no contact with pt for several years and "requesting that her phone number not be on patients chart, or be contacted at this time."  - currently full code    Palliative Care Encounter  Met with pt at bedside to introduce palliative service. He was engaged in conversation. He acknowledged diagnosis of "terminal" lung cancer and was told prognosis "6 months to 2 years." He is aware that any treatment that is being offered has been palliative in nature to slow disease progression. He was unable to recall any conversation by medical oncologist regarding poor candidacy for systemic treatment in setting of homeless, poor social support and concern for noncompliance to treatment. Pt did express wishes that if additional palliative RT is offered, he would like to pursue that route.     D/W CTS Dr. Amaya and NP Rayshawn who will reach out to his outpatient rad oncologist. If RT is offered, will need psych F/U to assess pt's capacity to make decision on treatment.     D/W Ethics Dr. Cassidy to assist in complex medical decision making.     Palliative team will follow clinical course and further goc.        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 Palliative consulted for support and goc.     PLAN    Endobronchial Mass  Stage 4 Lung Cancer  - diagnosed 7/2021 and s/p RT with Dr. George at [facility]  - s/p bronch and stent placement this admission  - appreciate oncology input, poor candidate for systemic treatment given homeless, no support system and concern for compliance with tx regimen  - seen by rad onc in house, no RT while hospitalized, rec F/U with Dr. George  - overall long term prognosis is poor  - CTS to follow up with Dr. George regarding if any further palliative treatment is being offered vs. hospice  - pt has indicated wishes to continue RT and treatment if offered    Dyspnea  Possible Post-Obtructive PNA  - O2 supplement PRN, bronchodilator, corticosteroids, antibiotic    Schizophrenia/Bipolar Disorder  - behaviour health following given concerns for suicidal ideation  - on risperidone     Advance Care Planning  - pt is estranged from family and per chart review Palliative NP note (8/13) at  contact number for sister was obtained. Sister indicated no contact with pt for several years and "requesting that her phone number not be on patients chart, or be contacted at this time."  - currently full code    Palliative Care Encounter  Met with pt at bedside to introduce palliative service. He was engaged in conversation. He acknowledged diagnosis of "terminal" lung cancer and was told prognosis "6 months to 2 years." He is aware that any treatment that is being offered has been palliative in nature to slow disease progression. He was unable to recall any conversation by medical oncologist regarding poor candidacy for systemic treatment in setting of homeless, poor social support and concern for noncompliance to treatment. Pt did express wishes that if additional palliative RT is offered, he would like to pursue that route.     D/W CTS Dr. Amaya and NP Rayshawn who will reach out to his outpatient rad oncologist. If RT is offered, will need psych F/U to assess pt's capacity to make decision on treatment.     D/W Ethics Dr. Cassidy to assist in complex medical decision making.     Palliative team will follow clinical course and further goc.        56M with hx of former smoker, polysusbtance abuse, schizophrenia, bipolar, stage 4 lung cancer s/p RT transferred from 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 Palliative consulted for support and goc.     PLAN    Endobronchial Mass  Stage 4 Lung Cancer  - diagnosed 7/2021 and s/p RT with Dr. George at [facility]  - s/p bronch and stent placement this admission  - appreciate oncology input, poor candidate for systemic treatment given homeless, no support system and concern for compliance with tx regimen  - seen by rad onc in house, no RT while hospitalized, rec F/U with Dr. George  - overall long term prognosis is poor  - CTS to follow up with Dr. George regarding if any further palliative treatment is being offered vs. hospice  - pt has indicated wishes to continue RT and treatment if offered    Dyspnea  Possible Post-Obtructive PNA  - O2 supplement PRN, bronchodilator, corticosteroids, antibiotic    Schizophrenia/Bipolar Disorder  - behaviour health following given concerns for suicidal ideation  - on risperidone     Advance Care Planning  - pt is estranged from family and per chart review Palliative NP note (8/13) at  contact number for sister was obtained. Sister indicated no contact with pt for several years and "requesting that her phone number not be on patients chart, or be contacted at this time."  - currently full code    Palliative Care Encounter  Met with pt at bedside to introduce palliative service. He was engaged in conversation. He acknowledged diagnosis of "terminal" lung cancer and was told prognosis "6 months to 2 years." He is aware that any treatment that is being offered has been palliative in nature to slow disease progression. He was unable to recall any conversation by medical oncologist regarding poor candidacy for systemic treatment in setting of homeless, poor social support and concern for noncompliance to treatment. Pt did express wishes that if additional palliative RT is offered, he would like to pursue that route.     D/W CTS Dr. Amaya and NP Rayshawn who will reach out to his outpatient rad oncologist. If treatments are offered, may need psych F/U if any concerns regarding pt's capacity to make decision on treatment.     D/W Ethics Dr. Cassidy to assist in complex medical decision making.     Palliative team will follow clinical course and further goc.

## 2021-08-24 NOTE — CHART NOTE - NSCHARTNOTEFT_GEN_A_CORE
Discussed with Dr. Sahu at length on pt. Pravin, Dr. Sahu willing to give further treatment as an outpatient, if necessary but treatment will be more of a palliative intervention. Of note Dr. George also mentioned patients PDL-1 is 100 % so there is a possibility for immunotherapy. Ask Dr. George to fax results. Heme/onco recalled, to review, however, social issues (homeless without social support) remain, making it difficult for patient to get treatment. Palliative and ethics following. SW working on dispo, however, many facilities have denied acceptance for placement.     plan to d/w thoracic team

## 2021-08-24 NOTE — PROGRESS NOTE ADULT - ASSESSMENT
56M homeless with a PMH of smoker (quit July 2021), polysubstance abuse (cocaine, marijuana), COPD, depression, recently diagnosed with lung cancer, has been receiving RT at [facility]. He was discharge from [facility] to [facility] 8/6/21. He then presented to  8/13 from [facility] due to respiratory distress requiring intubation. HE was found to have an endobronchial mass and was then transferred to [facility] for further care. He underwent FB and left mainstem bronchial stenting with Dr. Amaya on 8/16, then RTOR for right mainstem stent 8/18. Inpatient course has been complicated by suicidal ideations requiring constant observation,  consulted. Otherwise, he was seen by Heme/Onc who requested palliative care given patient is likely not a candidate for chemotherapy due to social issues.

## 2021-08-24 NOTE — CHART NOTE - NSCHARTNOTEFT_GEN_A_CORE
Source: Patient [ x]  Family [ ]   other [ ]    Current Diet: Diet, Regular:   Supplement Feeding Modality:  Oral  Ensure Enlive Cans or Servings Per Day:  3       Frequency:  Three Times a day (08-20-21 @ 09:49)    PO intake:  Pt reports fair/good po intake at meals; consumed between 50-75% at breakfast per tray observation.    Current Weight:   (8/24) 127.4 lbs  (8/16) 140.6 lbs  (8/13) 138.8 lbs    % Weight Change - question accuracy of wt trend, will continue to monitor.  No edema noted.     Pertinent Medications: MEDICATIONS  (STANDING):  albuterol/ipratropium for Nebulization 3 milliLiter(s) Nebulizer every 6 hours  aMIOdarone    Tablet 200 milliGRAM(s) Oral daily  amoxicillin  875 milliGRAM(s)/clavulanate 1 Tablet(s) Oral two times a day  ascorbic acid 500 milliGRAM(s) Oral daily  budesonide 160 MICROgram(s)/formoterol 4.5 MICROgram(s) Inhaler 2 Puff(s) Inhalation two times a day  cloNIDine 0.2 milliGRAM(s) Oral every 8 hours  collagenase Ointment 1 Application(s) Topical daily  enoxaparin Injectable 40 milliGRAM(s) SubCutaneous daily  multivitamin/minerals 1 Tablet(s) Oral daily  pantoprazole    Tablet 40 milliGRAM(s) Oral before breakfast  predniSONE   Tablet 40 milliGRAM(s) Oral daily  risperiDONE   Tablet 1 milliGRAM(s) Oral two times a day    MEDICATIONS  (PRN):  acetaminophen   Tablet .. 650 milliGRAM(s) Oral every 6 hours PRN Mild Pain (1 - 3)    Pertinent Labs: NA    Skin: no skin breakdown noted     Nutrition focused physical exam conducted - found signs of malnutrition [ ]absent [x ]present    Subcutaneous fat loss: [x ] Orbital fat pads region, [ ]Buccal fat region, [ ]Triceps region,  [ ]Ribs region    Muscle wasting: [ x]Temples region, [ x]Clavicle region, [x ]Shoulder region, [ ]Scapula region, [ ]Interosseous region,  [ ]thigh region, [ ]Calf region    Current Nutrition Diagnosis: Pt remains at nutrition risk secondary to severe protein calorie malnutrition (chronic) related to inability to consume sufficient protein energy intake in setting of lung ca as evidenced by pt meeting <75% estimated energy intake > 1 month, 12% unintentional weight loss x 1 month and signs of moderate/severe muscle wasting, moderate fat loss.  Pt with fair to good po intake at this time.  Interview limited as pt requesting nursing care.  RD to remain available.    Recommendations:   1. Continue with Ensure tid  2. Continue with MVI and Vit C daily  3. Obtain daily weight and monitor trend   4. Encourage po intake at meals and provide assistance as needed    Monitoring and Evaluation:   [x ] PO intake [x ] Tolerance to diet prescription [X] Weights  [X] Follow up per protocol [X] Labs:

## 2021-08-24 NOTE — CHART NOTE - NSCHARTNOTEFT_GEN_A_CORE
called by RN to report pt had SBP 80s and was  (not on telemetry) after having coughing fit and bringing up a lot of phlegm.  pt without IV access since yesterday (Dr. Amaya aware), currently refusing IV access or blood draws. Pt is alert and oriented to person, place, time and situation. He is cursing during conversation but is not agitated nor is he being abusive or threatening to staff. He states he does not want an IV because "you guys just poke and prod me and leave holes and bruises all over my body" pt states he also feels he receives better care and treatment at [facility] and would prefer to be there, but knows he can not get himself there since he is bedbound and does not want "to die in the street" trying to get there. Explained to pt why IV access necessary to help treat patient including give IV antibiotics, steroids and possible IV fluids if his Bp remains low and HR high. pt still not amenable to IV placement. States "you can give me pills for all of that", I explained to patient that giving him pills for antibiotics may not be as effective as the IV antibiotics, as well as there is no pill to help with his fluid status. Pt states he will drink more water, and asks to try oral antibiotics. Review of cultures shows mod strept alagactiae, sensitive to PCN, ampicillin and cefazolin. Will change zosyn to augmentin, and IV solumedrol to PO prednisone.    d/w dr. Bowman, who states to consider giving IM haldol to sedate patient and place IV if needed. After talking to patient, repeat VS improved, SR 90s and , temp 97.9. So2 98% on 4LNC.  Pt amenable to being placed back on telemetry.  CXR this AM unchanged. will likely need to reconsult behavioral health to confirm patient is able to make own decisions and refuse care. called by RN to report pt had SBP 80s and was  (not on telemetry) after having coughing fit and bringing up a lot of phlegm.  pt without IV access since yesterday (Dr. Amaya aware), currently refusing IV access or blood draws. Pt is alert and oriented to person, place, time and situation. He is cursing during conversation but is not agitated nor is he being abusive or threatening to staff. He states he does not want an IV because "you guys just poke and prod me and leave holes and bruises all over my body" pt states he also feels he receives better care and treatment at [facility] and would prefer to be there, but knows he can not get himself there since he is bedbound and does not want "to die in the street" trying to get there. Explained to pt why IV access necessary to help treat patient including give IV antibiotics, steroids and possible IV fluids if his Bp remains low and HR high. pt still not amenable to IV placement. States "you can give me pills for all of that", I explained to patient that giving him pills for antibiotics may not be as effective as the IV antibiotics, as well as there is no pill to help with his fluid status. Pt states he will drink more water, and asks to try oral antibiotics. Review of cultures shows mod strept alagactiae, sensitive to PCN, ampicillin and cefazolin. Will change zosyn to augmentin, and IV solumedrol to PO prednisone.    d/w dr. Bowman, who states to consider giving IM haldol to sedate patient and place IV if needed. After talking to patient, repeat VS improved, SR 90s and , temp 97.9. So2 98% on 4LNC.  Pt amenable to being placed back on telemetry.  CXR this AM unchanged. will likely need to reconsult behavioral health to confirm patient is able to make own decisions and refuse care. also consider ID consult to guide antibiotic management. pt has received 6 days of zosyn already, may not need additional abx.

## 2021-08-24 NOTE — PROGRESS NOTE ADULT - PROBLEM SELECTOR PLAN 5
Seen by psych 8/20  Placed on 1:1 for suicidal ideation  Most recent eval 8/21 patient no longer at risk  Agitated 8/22 and abusive towards staff.  Threatening to throw his full urinal at staff.  Demanding he gets his pocket knife back.  - constant observation kept in place  8/23 pt calm and restraints d/c'd and patient place in chair, Pt however refusing to have another IV placed. Pt understands he is getting antibiotic and maintains his refusal for any needles/ IVs  8/24 pt calm still refusing certain medications, no suicidal ideation noted

## 2021-08-24 NOTE — PROGRESS NOTE ADULT - PROBLEM SELECTOR PLAN 1
s/p Left Bronchial stenting on 8/16 and Rt Bronchial stenting on 8/18 with Dr Amaya.  Now on nasal O2.  Continue Duonebs and Symbicort  Solumedrol 30 BID   Tolerating diet.  Oncology follow up appreciated - palliative care consult ordered   -ideally pt should follow up at [facility] after discharge from the hospital. However, patient is homeless without social support, consistent followup during treatment could become an issue. Initiating chemotherapy in this situation would be unsafe for the patient  palliative consulted   F/U with [facility] to see if any further treatment can be offered ( as per Palliative, Ethics requesting this information to see if patient is candidate for Hospice)   if no other treatment able to be offered Psych will need to evaluate and determine capacity.   Dispo [facility] s/p Left Bronchial stenting on 8/16 and Rt Bronchial stenting on 8/18 with Dr Amaya.  Now on nasal O2.  Continue Duonebs and Symbicort  Solumedrol 30 BID   Tolerating diet.  Oncology follow up appreciated - palliative care consult ordered   -ideally pt should follow up at [facility] after discharge from the hospital. However, patient is homeless without social support, consistent followup during treatment could become an issue. Initiating chemotherapy in this situation would be unsafe for the patient  palliative consulted   F/U with [facility] to see if any further treatment can be offered ( as per Palliative, Ethics requesting this information to see if patient is candidate for Hospice)   if no other treatment able to be offered Psych will need to evaluate and determine capacity for hospice   Dispo [facility]

## 2021-08-24 NOTE — PROGRESS NOTE ADULT - SUBJECTIVE AND OBJECTIVE BOX
SUBJECTIVE:  pt alert NAD,   pt c/o of intermittent SOB, on O2. Pt denies any chest pain, palpitations, n/v.    Overnight events:  pt was hypotensive SBP 86-90's, and , pt asymptomatic   no intervention 2/2 to patient refusing     PAST MEDICAL & SURGICAL HISTORY:  Schizophrenia    Bipolar disorder    Depression    Lung cancer    Polysubstance abuse    No significant past surgical history        MEDICATIONS  acetaminophen   Tablet .. 650 milliGRAM(s) Oral every 6 hours PRN  albuterol/ipratropium for Nebulization 3 milliLiter(s) Nebulizer every 6 hours  aMIOdarone    Tablet 200 milliGRAM(s) Oral daily  amoxicillin  875 milliGRAM(s)/clavulanate 1 Tablet(s) Oral two times a day  ascorbic acid 500 milliGRAM(s) Oral daily  budesonide 160 MICROgram(s)/formoterol 4.5 MICROgram(s) Inhaler 2 Puff(s) Inhalation two times a day  cloNIDine 0.2 milliGRAM(s) Oral every 8 hours  collagenase Ointment 1 Application(s) Topical daily  enoxaparin Injectable 40 milliGRAM(s) SubCutaneous daily  multivitamin/minerals 1 Tablet(s) Oral daily  pantoprazole    Tablet 40 milliGRAM(s) Oral before breakfast  predniSONE   Tablet 40 milliGRAM(s) Oral daily  risperiDONE   Tablet 1 milliGRAM(s) Oral two times a day  MEDICATIONS  (PRN):  acetaminophen   Tablet .. 650 milliGRAM(s) Oral every 6 hours PRN Mild Pain (1 - 3)      Daily     Daily Weight in k.8 (24 Aug 2021 04:51)                      Objective:  T(C): 36.5 (21 @ 09:30), Max: 36.7 (21 @ 04:30)  HR: 95 (21 @ 09:30) (77 - 112)  BP: 115/70 (21 @ 09:30) (86/51 - 121/76)  RR: 19 (21 @ 09:30) (18 - 23)  SpO2: 95% (21 @ 09:30) (92% - 99%)  Wt(kg): --CAPILLARY BLOOD GLUCOSE      I&O's Summary    23 Aug 2021 07:01  -  24 Aug 2021 07:00  --------------------------------------------------------  IN: 360 mL / OUT: 750 mL / NET: -390 mL    24 Aug 2021 07:01  -  24 Aug 2021 13:05  --------------------------------------------------------  IN: 0 mL / OUT: 400 mL / NET: -400 mL        PHYSICAL EXAM   General: NAD  Neuro: AxO x3, non-focal, NAGEL  Cardiac: S1S2, no murmurs  Pulm: b/l Rhonchi no wheezing or rales  Abdomen: Soft, NT, ND, normoactive BS  Peripheral: +DP pulses b/l, no peripheral edema       Imaging:  CXR:      < from: Xray Chest 1 View- PORTABLE-Urgent (Xray Chest 1 View- PORTABLE-Urgent .) (21 @ 05:29) >    COMPARISON STUDY: 2021    Frontal expiratory view of the chest shows the heart to be similarin size. Left bronchial stent and right lung mass are again noted.    The lungs show minimal left base atelectasis and there is no evidence of pneumothorax nor definite pleural effusion.    IMPRESSION:  Minimal atelectasis.    Thank you for the courtesy of this referral.    --- End of Report ---    < end of copied text >

## 2021-08-24 NOTE — CONSULT NOTE ADULT - TIME BILLING
reviewing labs, notes, orders, radiographic studies, as well as counseling and coordinating care with the relevant multidisciplinary team, including with the primary and consulting providers.
D/W pt, RN, CTS Dr. Amaya, NP Rayshawn, Mason Cassidy

## 2021-08-25 PROCEDURE — 99232 SBSQ HOSP IP/OBS MODERATE 35: CPT

## 2021-08-25 RX ORDER — AMIODARONE HYDROCHLORIDE 400 MG/1
200 TABLET ORAL DAILY
Refills: 0 | Status: DISCONTINUED | OUTPATIENT
Start: 2021-08-25 | End: 2021-08-27

## 2021-08-25 RX ADMIN — Medication 500 MILLIGRAM(S): at 13:15

## 2021-08-25 RX ADMIN — PANTOPRAZOLE SODIUM 40 MILLIGRAM(S): 20 TABLET, DELAYED RELEASE ORAL at 06:06

## 2021-08-25 RX ADMIN — Medication 650 MILLIGRAM(S): at 06:03

## 2021-08-25 RX ADMIN — BUDESONIDE AND FORMOTEROL FUMARATE DIHYDRATE 2 PUFF(S): 160; 4.5 AEROSOL RESPIRATORY (INHALATION) at 21:29

## 2021-08-25 RX ADMIN — Medication 0.2 MILLIGRAM(S): at 06:03

## 2021-08-25 RX ADMIN — Medication 3 MILLILITER(S): at 03:35

## 2021-08-25 RX ADMIN — Medication 650 MILLIGRAM(S): at 21:23

## 2021-08-25 RX ADMIN — Medication 1 PATCH: at 13:15

## 2021-08-25 RX ADMIN — AMIODARONE HYDROCHLORIDE 200 MILLIGRAM(S): 400 TABLET ORAL at 06:03

## 2021-08-25 RX ADMIN — Medication 3 MILLILITER(S): at 14:42

## 2021-08-25 RX ADMIN — Medication 1 TABLET(S): at 06:03

## 2021-08-25 RX ADMIN — Medication 1 TABLET(S): at 13:15

## 2021-08-25 RX ADMIN — Medication 1 APPLICATION(S): at 13:15

## 2021-08-25 RX ADMIN — BUDESONIDE AND FORMOTEROL FUMARATE DIHYDRATE 2 PUFF(S): 160; 4.5 AEROSOL RESPIRATORY (INHALATION) at 09:25

## 2021-08-25 RX ADMIN — Medication 650 MILLIGRAM(S): at 07:28

## 2021-08-25 RX ADMIN — Medication 3 MILLILITER(S): at 21:28

## 2021-08-25 RX ADMIN — Medication 1 PATCH: at 19:42

## 2021-08-25 RX ADMIN — Medication 3 MILLILITER(S): at 09:24

## 2021-08-25 RX ADMIN — Medication 1 TABLET(S): at 18:05

## 2021-08-25 RX ADMIN — Medication 40 MILLIGRAM(S): at 06:03

## 2021-08-25 NOTE — CONSULT NOTE ADULT - ASSESSMENT
CONCLUSION: Thank you for including the Ethics Consultation Service in this challenging case. Ethics commends the team for their virtue in the care and support for Mr. Rhodes.     Mr. Rhodes presently demonstrates capacity for decision making regarding treatment options available to him for his stage 4 lung cancer. Further conversations regarding available treatment options should be discussed with the patient. Discharge planning should take his complex social situation into consideration paying particular attention to those related to social determinants of health.     Ethics will remain available for further discussions and decision points that may occur.   CASE DISCUSSED with  ETHICS ATTENDINGS: Alee Cassidy MS, MD, MPH, [facility].  More than 50% of the time of this consultation was spent in coordination of Care of Patient.	  REFERENCES:   i.	(i) Ursula TL & Tashia JF. Principles of Biomedical Ethics. [place]: [employer]; 2019.   ii.	(ii) CHYNA Mcmahon. Assessment of patients’ competence to consent to treatment. NEJM. 2007; 357:8168-7633.   iii.	(iii) Natasha GARDNER. "Trust and Distrust in Professional Ethics" in Natasha GARDNER, Dean CARMONA & Vi TONY. Ethics, Trust, and the Professions: Philosophical and Cultural Aspects. [place], [place]: [employer]; 1991.  iv.	(iv) Drew EK, Dex H, et al. Surviving Surrogate Decision-Making: What Helps and Hampers the Experience of Making Medical Decisions for Others. Soc Gen Int Med 2007;22:6672-8806.  v.	Giovanny, T. S., Marco A, YClaudy L., ESDRAS Martinez., WARNER Hsu., Terrell B. C., Farnaz, H. Z., ... & Jacqueline, S. (2019). Pembrolizumab versus chemotherapy for previously untreated, [id number], locally advanced or metastatic non-small-cell lung cancer (KEYNOTE-042): a randomised, open-label, controlled, phase 3 trial. The Lancet, 393(37418), 7374-8711.  vi.	Janae ALVAREZ (2001). A glossary for social epidemiology. J Epidemiol Community Health, 55(10): 693–700.

## 2021-08-25 NOTE — CHART NOTE - NSCHARTNOTESELECT_GEN_ALL_CORE
Nutrition Services
CTS/Event Note
CTS/Event Note
Event Note
Nutrition Services
Oncology/Event Note
Thoracic Surgery/Event Note

## 2021-08-25 NOTE — PROGRESS NOTE ADULT - PROBLEM SELECTOR PLAN 1
s/p Left Bronchial stenting on 8/16 and Rt Bronchial stenting on 8/18 with Dr Amaya.  Now on nasal O2.  Continue Duonebs and Symbicort  Prednisone  30 QD> x 3 days than continue to taper  Tolerating diet.  Oncology follow up appreciated - palliative care consult ordered   -ideally pt should follow up at [facility] after discharge from the hospital. However, patient is homeless without social support, consistent followup during treatment could become an issue. Initiating chemotherapy in this situation would be unsafe for the patient  palliative consulted   F/U with [facility] to see if any further treatment can be offered ( as per Palliative, Ethics requesting this information to see if patient is candidate for Hospice)   if no other treatment able to be offered Psych will need to evaluate and determine capacity for hospice   Dispo [facility]

## 2021-08-25 NOTE — PROGRESS NOTE ADULT - SUBJECTIVE AND OBJECTIVE BOX
Subjective: "What are you here for, Im not talking"  Supine in bed, covers over his head      V/S  T(C): 36.9 (08-25-21 @ 05:53), Max: 37.1 (08-24-21 @ 21:33)  HR: 77 (08-25-21 @ 09:26) (77 - 96)  BP: 110/71 (08-25-21 @ 05:53) (102/67 - 111/68)  RR: 18 (08-25-21 @ 05:53) (18 - 18)  SpO2: 99% (08-25-21 @ 09:26) (96% - 100%)    I&O's Detail    24 Aug 2021 07:01  -  25 Aug 2021 07:00  --------------------------------------------------------  IN:    Oral Fluid: 720 mL  Total IN: 720 mL    OUT:    Voided (mL): 800 mL  Total OUT: 800 mL    Total NET: -80 mL          08-24-21 @ 07:01  -  08-25-21 @ 07:00  --------------------------------------------------------  IN: 720 mL / OUT: 800 mL / NET: -80 mL      MEDICATIONS  (STANDING):  albuterol/ipratropium for Nebulization 3 milliLiter(s) Nebulizer every 6 hours  aMIOdarone    Tablet 200 milliGRAM(s) Oral daily  amoxicillin  875 milliGRAM(s)/clavulanate 1 Tablet(s) Oral two times a day  ascorbic acid 500 milliGRAM(s) Oral daily  budesonide 160 MICROgram(s)/formoterol 4.5 MICROgram(s) Inhaler 2 Puff(s) Inhalation two times a day  cloNIDine Patch 0.3 mG/24Hr(s) 1 patch Transdermal every 7 days  collagenase Ointment 1 Application(s) Topical daily  enoxaparin Injectable 40 milliGRAM(s) SubCutaneous daily  multivitamin/minerals 1 Tablet(s) Oral daily  pantoprazole    Tablet 40 milliGRAM(s) Oral before breakfast  predniSONE   Tablet 30 milliGRAM(s) Oral daily  risperiDONE   Tablet 1 milliGRAM(s) Oral two times a day        Physical Exam:      General: NAD    Neuro: AxO , limited conversation, does not make eye contact, head under covers NAGEL    Cardiac: S1S2, no murmurs    Pulm: b/l Rhonchi no wheezing or rales    Abdomen: Soft, NT, ND, normoactive BS    Peripheral: +DP pulses b/l, no peripheral edema         PAST MEDICAL & SURGICAL HISTORY:  Schizophrenia    Bipolar disorder    Depression    Lung cancer    Polysubstance abuse    No significant past surgical history

## 2021-08-25 NOTE — CONSULT NOTE ADULT - CONSULT REASON
Lung cancer
evaluation and consideration of palliative radiotherapy to chest.
GOC
To assist in the ethical dilemma posed by a 56-year-old male with a recent diagnosis of stage 4 lung cancer, regarding decision making capacity and plan of care.
Lung CA

## 2021-08-25 NOTE — PROGRESS NOTE ADULT - PROBLEM SELECTOR PLAN 5
Seen by psych 8/20  Placed on 1:1 for suicidal ideation  Most recent eval 8/21 patient no longer at risk  Agitated 8/22 and abusive towards staff.  Threatening to throw his full urinal at staff.  Demanding he gets his pocket knife back.  - constant observation kept in place  8/23 pt calm and restraints d/c'd and patient place in chair, Pt however refusing to have another IV placed. Pt understands he is getting antibiotic and maintains his refusal for any needles/ IVs  8/24 pt calm still refusing certain medications, no suicidal ideation noted  8/25 pt remains calm  though still refusing certain medications, no suicidal ideation noted

## 2021-08-25 NOTE — PROGRESS NOTE ADULT - ASSESSMENT
56M with hx of former smoker, polysusbtance abuse, schizophrenia, bipolar, stage 4 lung cancer s/p RT transferred from [facility] due to acute respiratory failure secondary to obstructive endobronchial mass on CT s/p bronch with stent placement, successful extubated with prolong hospital course including suicidal ideation, agitation requiring 1:1 with intermittent noncompliance with medical care.     PLAN    Endobronchial Mass  Stage 4 Lung Cancer  - diagnosed 7/2021 and s/p RT with Dr. George at [facility]  - s/p bronch and stent placement this admission  - CTS NP spoke with Dr. George (rad onc at [facility]) who is offering further palliative RT and rec onc F/U given % with potential for immunotherapy  - onc following, has expressed concern over compliance with treatment due to social issues (homeless, no family support)   - pt has expressed to both palliative and ethics team separately his wishes to pursue treatment     Dyspnea  Possible Post-Obtructive PNA  - O2 supplement PRN, bronchodilator, corticosteroids, antibiotic    Schizophrenia/Bipolar Disorder  - behaviour health following given concerns for suicidal ideation  - on risperidone     Debility  - pt is amendable to [facility]    Advance Care Planning  - estranged from family  - full code    Palliative Care Encounter  Cased discussed with ethics. Per discussion with pt yesterday, Austyn is able to express insight into his advance lung cancer stating as "terminal" and its overall poor prognosis. He wishes to pursue all disease directed therapy with understanding they are not curative but palliative in nature with hopes to slow disease progression.     D/W ZACK Lin who is working on ALYX placement which is limited given pt's behavioural outburst and irritability.     No further recommendations from a palliative standpoint. Will sign off. Please reconsult PRN  Dispo planning per [facility]. Ongoing medical mgnt per primary team.    Recommend advance illness referral on discharge for additional support.

## 2021-08-25 NOTE — PROGRESS NOTE ADULT - SUBJECTIVE AND OBJECTIVE BOX
[facility] PALLIATIVE MEDICINE FOLLOW UP VISIT    INTERVAL HPI/OVERNIGHT EVENTS:  Source if other than patient:  []Family   [x]Team     No acute events overnight.   Pt was easily irritable and when asked how he was doing replied "not great." On further prompting he got more angry    Present Symptoms:   Dyspnea: Yes No   Nausea/Vomiting: Yes No  Anxiety:  Yes No  Depression: Yes No  Fatigue: Yes No  Loss of appetite: Yes No  Constipation: Yes No  Pain: Yes No    Review of Systems: Reviewed, All others negative  Limited ROS due to   Unable to perform ROS due to poor mentation/encephalopathy      MEDICATIONS  (STANDING):  albuterol/ipratropium for Nebulization 3 milliLiter(s) Nebulizer every 6 hours  aMIOdarone    Tablet 200 milliGRAM(s) Oral daily  amoxicillin  875 milliGRAM(s)/clavulanate 1 Tablet(s) Oral two times a day  ascorbic acid 500 milliGRAM(s) Oral daily  budesonide 160 MICROgram(s)/formoterol 4.5 MICROgram(s) Inhaler 2 Puff(s) Inhalation two times a day  cloNIDine Patch 0.3 mG/24Hr(s) 1 patch Transdermal every 7 days  collagenase Ointment 1 Application(s) Topical daily  enoxaparin Injectable 40 milliGRAM(s) SubCutaneous daily  multivitamin/minerals 1 Tablet(s) Oral daily  pantoprazole    Tablet 40 milliGRAM(s) Oral before breakfast  predniSONE   Tablet 30 milliGRAM(s) Oral daily  risperiDONE   Tablet 1 milliGRAM(s) Oral two times a day    MEDICATIONS  (PRN):  acetaminophen   Tablet .. 650 milliGRAM(s) Oral every 6 hours PRN Mild Pain (1 - 3)      PHYSICAL EXAM:    Vital Signs Last 24 Hrs  T(C): 36.9 (25 Aug 2021 05:53), Max: 37.1 (24 Aug 2021 21:33)  T(F): 98.4 (25 Aug 2021 05:53), Max: 98.7 (24 Aug 2021 21:33)  HR: 77 (25 Aug 2021 09:26) (77 - 96)  BP: 110/71 (25 Aug 2021 05:53) (102/67 - 111/68)  BP(mean): --  RR: 18 (25 Aug 2021 05:53) (18 - 18)  SpO2: 99% (25 Aug 2021 09:26) (96% - 100%)    Palliative Performance Status Version 2:   http://npcrc.org/files/news/palliative_performance_scale_ppsv2.pdf    General: Resting comfortably. No acute distress. cachexia  obese  nonverbal  coma  HEENT: mucous membrane moist dry.  ETT/trach or NGT  Lungs: clear to auscultation bilaterally. no rales, rhonchi, wheezing. non-labored.   CV: +s1/s2. Regular rate and rhythm.  murmurs, rubs, gallop  GI: +bowel sound. abdomen soft, non-tender, non-distended, obese. guarding. PEG.   MSK: Moves all 4 extremities. No cyanosis or edema. weakness. ambulatory  bedbound/wheelchair bound  Neuro: Awake and alert, oriented to person/place/time. Interactive, nonverbal.   : suprapubic tenderness to palpation. [place]  Skin: warm and dry. no rash. ecchymosis. wounds.      LABS:                    RADIOLOGY & ADDITIONAL STUDIES: Reviewed, no new recent studies    ADVANCE DIRECTIVES:   DNR YES NO  Completed on:                     MOLST  YES NO   Completed on:  Living Will  YES NO   Completed on: [facility] PALLIATIVE MEDICINE FOLLOW UP VISIT    INTERVAL HPI/OVERNIGHT EVENTS:  Source if other than patient:  []Family   [x]Team     No acute events overnight.   Pt was easily irritable and when asked how he was doing replied "not great." When I attempted to inquire further, he became more angry followed by silvestre and asked me to leave.      Present Symptoms:   Dyspnea:  No   Nausea/Vomiting: unable  Anxiety:  unable  Depression: unable  Fatigue:  No  Loss of appetite:  No  Constipation: unable  Pain: unable    Unable to perform ROS due unwillingness to engage in conversation.       MEDICATIONS  (STANDING):  albuterol/ipratropium for Nebulization 3 milliLiter(s) Nebulizer every 6 hours  aMIOdarone    Tablet 200 milliGRAM(s) Oral daily  amoxicillin  875 milliGRAM(s)/clavulanate 1 Tablet(s) Oral two times a day  ascorbic acid 500 milliGRAM(s) Oral daily  budesonide 160 MICROgram(s)/formoterol 4.5 MICROgram(s) Inhaler 2 Puff(s) Inhalation two times a day  cloNIDine Patch 0.3 mG/24Hr(s) 1 patch Transdermal every 7 days  collagenase Ointment 1 Application(s) Topical daily  enoxaparin Injectable 40 milliGRAM(s) SubCutaneous daily  multivitamin/minerals 1 Tablet(s) Oral daily  pantoprazole    Tablet 40 milliGRAM(s) Oral before breakfast  predniSONE   Tablet 30 milliGRAM(s) Oral daily  risperiDONE   Tablet 1 milliGRAM(s) Oral two times a day    MEDICATIONS  (PRN):  acetaminophen   Tablet .. 650 milliGRAM(s) Oral every 6 hours PRN Mild Pain (1 - 3)      PHYSICAL EXAM:    Vital Signs Last 24 Hrs  T(C): 36.9 (25 Aug 2021 05:53), Max: 37.1 (24 Aug 2021 21:33)  T(F): 98.4 (25 Aug 2021 05:53), Max: 98.7 (24 Aug 2021 21:33)  HR: 77 (25 Aug 2021 09:26) (77 - 96)  BP: 110/71 (25 Aug 2021 05:53) (102/67 - 111/68)  BP(mean): --  RR: 18 (25 Aug 2021 05:53) (18 - 18)  SpO2: 99% (25 Aug 2021 09:26) (96% - 100%)    Palliative Performance Status Version 2: 30%  http://npcrc.org/files/news/palliative_performance_scale_ppsv2.pdf    Limited exam due to being uncooperative.   General: Resting comfortably. No acute distress.   HEENT: mmm  Lungs: comfortable. non-labored.   CV: +s1/s2.    GI: unable  MSK: Moves all 4 extremities. weakness  Neuro: nonfocal. wakes easily to verbal stimuli.    Skin: warm and dry.   Psych: irritable    LABS: reviewed  RADIOLOGY & ADDITIONAL STUDIES: Reviewed, no new recent studies

## 2021-08-25 NOTE — CHART NOTE - NSCHARTNOTEFT_GEN_A_CORE
Discussed with the staff regarding patient's living situation. Currently there is no immediate plan for treatment of lung cancer as the patient cannot follow up consistently with us at [facility]. Initiating treatment can cause more harm in such situation. When pt is is out of rehab, pt can follow up with us at [facility] to discuss treatment. Alternatively, [facility] or [facility] may be able to provide transport for outpt visits while in rehab.      Kade Zuluaga MD  Medical [place] and Hematology  [facility]  [phone]

## 2021-08-25 NOTE — CONSULT NOTE ADULT - SUBJECTIVE AND OBJECTIVE BOX
[facility] PALLIATIVE MEDICINE CONSULT    CC: Patient is a 56y old  Male who presents with a chief complaint of Endobronchial Mass (18 Aug 2021 05:11)    HPI:  Pt is a poor historian of his medical history, info obtained from HPI.   "55 y/o M homeless with a PMH of smoker ( quit one month ago), polysubstance abuse ( cocaine , marihuana), COPD, depression recently diagnosed with lung cancer came to the ED BIBEMs from [place] due to respiratory distress. Pt reports was diagnosed with lung cancer after an admission on [facility] on 7/7/21 due to suicide attempt. He is s/p right back chest wall mass biopsy on 7/8/21 that showed poorly differentiated carcinoma and EBUS/ bronch by CT surgery was deferred. Pt was recently discharged from [facility] to [place] on 8/6/21.Pt reports since was diagnosed with lung cancer is experiencing progressive SOB. SOB got worse since was d/c from [facility] one week ago. This morning patient wake up with extreme SOB, was gasping for air and EMS called. He endorses associated sweating, chest pain and palpitations during the event. Pt have been experiencing on/off  substernal chest pain and palpitations since was dx with lung cancer. He describes chest pain as pressure like, non radiating and exacerbated with coughing. He endorse cough since approximately one month ago of yellow sputum and tingle blood on it. He states has hemoptysis since 8/2/21  but have been getting better since then. Last episode of hemoptysis was three days ago. He have loss approximately 10-20 pounds since July, 2021.He reports supposed to follow up with hematology- oncology but have not seen  him yet. He has been receiving radiation being the second dose today but missed the appointment due was but missed the appointment due was brought to the hospital. He denies fever, chills, nausea , vomits, leg swelling, orthopnea. Patient had a CT of chest showing an endobronchial lesion. (13 Aug 2021 15:23)"    Mr. Rhodes is a 56 year old male with PMHx of smoking, polysubstance abuse, schizophrenia, bipolar disorder, recently diagnosed (7/2021) lung cancer s/p RT, multiple hospitalizations transferred from [facility] to [facility] for acute respiratory failure secondary to obstructing endobronchial mass seen on CT. S/P intubation for airway protection and subsequently went to OR for bronch and stent placement by thoracic surgery. Successfully extubated on 8/19. Seen by rad oncology, no plan for inpatient RT and [facility] Evaluated by oncology with concerns of tolerating systemic therapy given psychiatric disorder including suicidal ideations, homelessness, poor family support and ability to maintain appropriate compliance with regimen. Psych on board. Pt has been intermittently noncompliant with medical care and medication while hospitalized and at times verbally abuse towards staff, currently off 1:1. Palliative consulted for support and GOC.     Present Symptoms:   Dyspnea:  No   Nausea/Vomiting:  No  Anxiety:   No  Depression: Yes  due to situation  Fatigue:  No  Loss of appetite: Yes   Pain: Yes at back          Review of Systems: As per HPI.  All others negative    Source if other than patient:  []Family   [x]Team     PERTINENT PMH REVIEWED: Yes     PAST MEDICAL & SURGICAL HISTORY:  Schizophrenia    Bipolar disorder    Depression    Lung cancer    Polysubstance abuse    No significant past surgical history        SOCIAL HISTORY:    Admitted from:  homeless  - children:    Baseline ADLs (prior to admission)  [place]: []Total  [x] Moderate []Dependent    Palliative Performance Status Version 2: 30%  http://npcrc.org/files/news/palliative_performance_scale_ppsv2.pdf      ADVANCE DIRECTIVES:   DNR YES NO  Completed on:                     MOLST  YES NO   Completed on:  Living Will  YES NO   Completed on:    DECISION MAKER(s):  [] Health Care Proxy(s)  [] Surrogate(s)  [] Guardian             Name:  Phone Number:      FAMILY HISTORY:  FH: diabetes mellitus (Mother)    FH: lung cancer  father         Allergies    Allergy Status Unknown    Intolerances    paper plates/tray and plastic utensils only (Unknown)  PAPER TRAY AND PLASTIC UTENCILS ONLY (Unknown)      MEDICATIONS  (STANDING):  albuterol/ipratropium for Nebulization 3 milliLiter(s) Nebulizer every 6 hours  aMIOdarone    Tablet 200 milliGRAM(s) Oral daily  amoxicillin  875 milliGRAM(s)/clavulanate 1 Tablet(s) Oral two times a day  ascorbic acid 500 milliGRAM(s) Oral daily  budesonide 160 MICROgram(s)/formoterol 4.5 MICROgram(s) Inhaler 2 Puff(s) Inhalation two times a day  cloNIDine 0.2 milliGRAM(s) Oral every 8 hours  collagenase Ointment 1 Application(s) Topical daily  enoxaparin Injectable 40 milliGRAM(s) SubCutaneous daily  multivitamin/minerals 1 Tablet(s) Oral daily  pantoprazole    Tablet 40 milliGRAM(s) Oral before breakfast  predniSONE   Tablet 40 milliGRAM(s) Oral daily  risperiDONE   Tablet 1 milliGRAM(s) Oral two times a day    MEDICATIONS  (PRN):  acetaminophen   Tablet .. 650 milliGRAM(s) Oral every 6 hours PRN Mild Pain (1 - 3)      PHYSICAL EXAM:    Vital Signs Last 24 Hrs  T(C): 36.5 (24 Aug 2021 09:30), Max: 36.7 (24 Aug 2021 04:30)  T(F): 97.7 (24 Aug 2021 09:30), Max: 98 (24 Aug 2021 04:30)  HR: 92 (24 Aug 2021 13:21) (77 - 112)  BP: 105/65 (24 Aug 2021 13:21) (86/51 - 121/76)  BP(mean): 71 (23 Aug 2021 22:05) (71 - 71)  RR: 19 (24 Aug 2021 09:30) (18 - 23)  SpO2: 95% (24 Aug 2021 09:30) (92% - 99%)    General: Resting comfortably. No acute distress.   HEENT: MMM poor dentition  Lungs: comfortable. non-labored.   CV: +s1/s2. rrr  GI:+ bowel sound. abdomen soft, non-tender, non-distended.  MSK: Moves all 4 extremities.  No cyanosis or edema. weakness.   Neuro: nonfocal. Awake and alert, oriented x3. Interactive  Skin: warm and dry.   Psych: calm     LABS: reviewed  RADIOLOGY & ADDITIONAL STUDIES:      CXR 8/24/21     EXAM:  XR CHEST PORTABLE URGENT 1V                          PROCEDURE DATE:  08/24/2021          INTERPRETATION:  Chest one view    HISTORY: Cough    COMPARISON STUDY: 8/23/2021    Frontal expiratory view of the chest shows the heart to be similar in size. Left bronchial stent and right lung mass are again noted.    The lungs show minimal left base atelectasis and there is no evidence of pneumothorax nor definite pleural effusion.    IMPRESSION:  Minimal atelectasis.    Thank you for the courtesy of this referral.    --- End of Report ---            BETZAIDA LUBIN MD; Attending Interventional Radiologist  This document has been electronically signed. Aug 24 2021 11:47AM    CTA 8/13/21    EXAM:  CT ANGIO CHEST PULSHENG DODGE                            PROCEDURE DATE:  08/13/2021          INTERPRETATION:  CLINICAL INFORMATION: Cancer. Pneumonia. Respiratory distress. Evaluate for pulmonary embolism    COMPARISON: CT chest 7/7/2021.    CONTRAST/COMPLICATIONS:  IV Contrast: Omnipaque 350  90 cc administered   10 cc discarded  Oral Contrast: NONE  Complications: None reported at time of study completion    PROCEDURE:  CT Angiography of the Chest.  Sagittal and coronal reformats were performed as well as 3D (MIP) reconstructions.    FINDINGS:    LUNGS AND AIRWAYS: There is marked narrowing of the bilateral mainstem bronchus. The right lower lobe bronchus intermedius is occluded and there is distal impaction of the bronchi. There is no associated atelectasis. Severe emphysema. 3.2 x 2.7 cm right middle lobe mass is stable. 4 mm right lower lobe nodule (2:90), unchanged. Seen right lower lobe opacity has resolved. There are a few scattered calcified subcentimeter granulomas.  PLEURA: No pleural effusion.  MEDIASTINUM AND RAULITO: Right paratracheal lymph node measures 3.3 x 2.9 cm previously 2.8 x 2.4 cm. A subcarinal mass measures approximately 6.1 x 5.0 cm previously 4.8 x 4.7 cm.  VESSELS: There is no pulmonary embolism. Subcarinal mediastinal mass mildly narrows the right pulmonary artery which remains patent.  HEART: Heart size is normal. No pericardial effusion.  CHEST WALL AND LOWER NECK: Previously seen right posterior 3.3 cm mass is no longer present.  VISUALIZED UPPER ABDOMEN: 3.0 cm indeterminant hypodense and is in size. Marked thickening of the left adrenal gland is stable.  BONES: No changes.    IMPRESSION:  No pulmonary embolism.    Enlarging mediastinal masses which narrow the airways are as described above.    Stable right middle lobe mass.        --- End of Report ---      MI NICHOLSON MD; Attending Radiologist  This document has been electronically signed. Aug 13 2021  1:08AM    TTE 8/13/21   Impression     Summary     Mild aortic sclerosis is present with normal valvular opening.   Normal appearing tricuspid valve structure and function.   Trace tricuspid valve regurgitation is present.   Endocardium is not ALWAYS well visualized, however, overall left   ventricular systolic function appears normal. Technically Difficult Study.   Estimated left ventricular ejection fraction is 55-60 %.   Normal appearing right ventricle structure and function.   Trace pericardial effusion cannot be ruled out.      Thank you for the opportunity to assist with the care of this patient.   Palliative Medicine Consult Service [phone]. 
55 y/o M homeless with a PMH of smoker ( quit one month ago), polysubstance abuse ( cocaine , marihuana), COPD, depression recently diagnosed with lung cancer came to the ED BIBEMs from [place] due to respiratory distress. Pt reports was diagnosed with lung cancer after an admission on [facility] on 7/7/21 due to suicide attempt. He is s/p right back chest wall mass biopsy on 7/8/21 that showed poorly differentiated carcinoma and EBUS/ bronch by CT surgery was deferred. Pt was recently discharged from [facility] to [place] on 8/6/21.Pt reports since was diagnosed with lung cancer is experiencing progressive SOB. SOB got worse since was d/c from [facility] one week ago and had extreme SOB, was gasping for air and EMS was called. . Pt was taken to [facility] initially on 8/13. CT showed enlarging MS masses with narrowing of the air ways. He was later transferred to [facility] for bronchoscopy and stent placement. Pt became unstable, in respiratory distress accompanied with hypertension and tachycardia , emergent tracheal intubation was performed. He is now s/p bronchoscopy and endobroncial stent placement to maintain patent airways.    For lung cancer, he is currently  undergoing RT at [facility]. He is supposed to follow up with Dr. Bhatt at [facility] for systemic treatment.        Allergies  Allergy Status Unknown        PAST MEDICAL & SURGICAL HISTORY:  Schizophrenia  Bipolar disorder  Depression  Lung cancer  Polysubstance abuse    No significant past surgical history      FAMILY HISTORY:  FH: diabetes mellitus (Mother)    FH: lung cancer  father        Social History:  hx ofo polysubstance abuse    REVIEW OF SYSTEMS:  unable to obtain, pt is intubated and sedated    VITAL SIGNS:  Vital Signs Last 24 Hrs  T(C): 36.7 (18 Aug 2021 07:00), Max: 38.1 (17 Aug 2021 12:00)  T(F): 98.1 (18 Aug 2021 07:00), Max: 100.6 (17 Aug 2021 12:00)  HR: 65 (18 Aug 2021 08:48) (62 - 196)  BP: 158/77 (18 Aug 2021 08:45) (87/52 - 158/77)  BP(mean): 111 (18 Aug 2021 08:45) (64 - 111)  RR: 30 (18 Aug 2021 08:45) (16 - 65)  SpO2: 99% (18 Aug 2021 08:48) (91% - 99%)      PHYSICAL EXAM:     GENERAL: intubated, sedated  HEENT: ET tube in place  RESPIRATORY: LCTAB/L, no rhonchi, rales, or wheezing  CARDIOVASCULAR: RRR, no murmurs, gallops, rubs  ABDOMINAL: soft, non-tender, non-distended, positive bowel sounds   EXTREMITIES: no clubbing, cyanosis, or edema  NEUROLOGICAL: alert and oriented x 3, non-focal  SKIN: no rashes or lesions   MUSCULOSKELETAL: no gross joint deformity                          9.6    7.88  )-----------( 245      ( 18 Aug 2021 03:15 )             30.0     08-18    143  |  105  |  20.7<H>  ----------------------------<  149<H>  4.0   |  29.0  |  0.57    Ca    9.2      18 Aug 2021 03:15  Phos  3.6     08-16  Mg     2.1     08-18    TPro  6.0<L>  /  Alb  3.1<L>  /  TBili  <0.2<L>  /  DBili  x   /  AST  10  /  ALT  15  /  AlkPhos  99  08-16      MEDICATIONS  (STANDING):  albuterol/ipratropium for Nebulization 3 milliLiter(s) Nebulizer every 6 hours  aMIOdarone    Tablet 400 milliGRAM(s) Oral every 8 hours  budesonide 160 MICROgram(s)/formoterol 4.5 MICROgram(s) Inhaler 2 Puff(s) Inhalation two times a day  chlorhexidine 0.12% Liquid 5 milliLiter(s) Oral Mucosa two times a day  chlorhexidine 2% Cloths 1 Application(s) Topical daily  collagenase Ointment 1 Application(s) Topical daily  dextrose 5% + sodium chloride 0.9%. 1000 milliLiter(s) (75 mL/Hr) IV Continuous <Continuous>  fentaNYL   Infusion 0.5 MICROgram(s)/kG/Hr (3.15 mL/Hr) IV Continuous <Continuous>  meperidine     Injectable 25 milliGRAM(s) IV Push once  methylPREDNISolone sodium succinate Injectable 60 milliGRAM(s) IV Push every 8 hours  pantoprazole  Injectable 40 milliGRAM(s) IV Push daily  phenylephrine    Infusion 0.2 MICROgram(s)/kG/Min (4.73 mL/Hr) IV Continuous <Continuous>  piperacillin/tazobactam IVPB.. 3.375 Gram(s) IV Intermittent every 8 hours  polyethylene glycol 3350 17 Gram(s) Oral daily  potassium chloride  10 mEq/50 mL IVPB 10 milliEquivalent(s) IV Intermittent every 1 hour  potassium chloride  10 mEq/50 mL IVPB 10 milliEquivalent(s) IV Intermittent every 1 hour  potassium chloride  10 mEq/50 mL IVPB 10 milliEquivalent(s) IV Intermittent every 1 hour  propofol Infusion 52.91 MICROgram(s)/kG/Min (20 mL/Hr) IV Continuous <Continuous>  risperiDONE   Tablet 1 milliGRAM(s) Oral two times a day  sodium chloride 0.9%. 1000 milliLiter(s) (10 mL/Hr) IV Continuous <Continuous>  sodium chloride 0.9%. 1000 milliLiter(s) (10 mL/Hr) IV Continuous <Continuous>    < from: CT Angio Chest PE Protocol w/ IV Cont (08.13.21 @ 00:21) >  FINDINGS:    LUNGS AND AIRWAYS: There is marked narrowing of the bilateral mainstem bronchus. The right lower lobe bronchus intermedius is occluded and there is distal impaction of the bronchi. There is no associated atelectasis. Severe emphysema. 3.2 x 2.7 cm right middle lobe mass is stable. 4 mm right lower lobe nodule (2:90), unchanged. Seen right lower lobe opacity has resolved. There are a few scattered calcified subcentimeter granulomas.  PLEURA: No pleural effusion.  MEDIASTINUM AND RAULITO: Right paratracheal lymph node measures 3.3 x 2.9 cm previously 2.8 x 2.4 cm. A subcarinal mass measures approximately 6.1 x 5.0 cm previously 4.8 x 4.7 cm.  VESSELS: There is no pulmonary embolism. Subcarinal mediastinal mass mildly narrows the right pulmonary artery which remains patent.  HEART: Heart size is normal. No pericardial effusion.  CHEST WALL AND LOWER NECK: Previously seen right posterior 3.3 cm mass is no longer present.  VISUALIZED UPPER ABDOMEN: 3.0 cm indeterminant hypodense and is in size. Marked thickening of the left adrenal gland is stable.  BONES: No changes.    IMPRESSION:  No pulmonary embolism.    Enlarging mediastinal masses which narrow the airways are as described above.    Stable right middle lobe mass.    < end of copied text >      `< from: IR Fluoroscopy <1 Hour (08.16.21 @ 17:33) >  PROCEDURE DATE:  08/16/2021          INTERPRETATION:  IMPRESSION: Imaging guidance was provided by the Department of Radiology for a procedure performed by a physician from another clinical department. This study was performed and will be interpreted by that physician and therefore the department of radiology will not render an interpretation of these images.    This report was generated by an  in the department of radiology.    --- End of Report ---    < end of copied text >    
57 y/o M homeless with a PMH of smoker ( quit one month ago), polysubstance abuse ( cocaine , marihuana), COPD, depression recently diagnosed with lung cancer came to the ED BIBEMs from [place] due to respiratory distress. Pt reports was diagnosed with lung cancer after an admission on [facility] on 7/7/21 due to suicide attempt. He is s/p right back chest wall mass biopsy on 7/8/21 that showed poorly differentiated carcinoma and EBUS/ bronch by CT surgery was deferred. Pt was recently discharged from [facility] to [place] on 8/6/21.Pt reports since was diagnosed with lung cancer is experiencing progressive SOB. SOB got worse since was d/c from [facility] one week ago. This morning patient wake up with extreme SOB, was gasping for air and EMS called. He endorses associated sweating, chest pain and palpitations during the event. Pt have been experiencing on/off  substernal chest pain and palpitations since was dx with lung cancer. He describes chest pain as pressure like, non radiating and exacerbated with coughing. He endorse cough since approximately one month ago of yellow sputum and tingle blood on it. He states has hemoptysis since 8/2/21  but have been getting better since then. Last episode of hemoptysis was three days ago. He have loss approximately 10-20 pounds since July, 2021.He reports supposed to follow up with hematology- oncology but have not seen  him yet. He has been receiving radiation being the second dose today but missed the appointment due was but missed the appointment due was brought to the hospital. He denies fever, chills, nausea , vomits, leg swelling, orthopnea. Patient had a CT of chest showing an endobronchial lesion.  The patient had a bronchoscopy and biopsy at [facility] on 7/28, the pathology of which revealed malignant cells. Based on this diagnosis and multiple mediastinal masses and parenchymal lung masses causing bronchial obstructions, he underwent radiotherapy treatment at [facility] and has had 2 fractions of a planned 10 before being admitted emergently to [facility] with SOB and CP.  Now admitted for acute SOB and CP. Admitted to ICU , intubated , s/p bronchial stent placement , still intubated , scheduled for second bronchial stent placement tomorrow (8/18/21)  Review of Systems: Unable to obtain. Patient intubated and sedated.  Home Medications:  * Patient Currently Takes Medications as of 13-Aug-2021 12:46 documented in Structured Notes · 	Cardizem 60 mg oral tablet: 1 tab(s) orally 3 times a day tiotropium 18 mcg inhalation capsule: 1 cap(s) inhaled once a day · 	budesonide-formoterol 160 mcg-4.5 mcg/inh inhalation aerosol: 2 puff(s) inhaled 2 times a day  · 	risperiDONE 1 mg oral tablet: 1 tab(s) orally 2 times a day · 	traZODone 100 mg oral tablet: 1 tab(s) orally once a day (at bedtime) · 	albuterol 90 mcg/inh inhalation aerosol: 1 puff(s) inhaled every 4 hours  Current Medications:  Order Name chlorhexidine 2% Cloths piperacillin/tazobactam IVPB.. dextrose 5% + sodium chloride 0.9%. aMIOdarone    Tablet albuterol/ipratropium for Nebulization budesonide 160 MICROgram(s)/formoterol 4.5 MICROgram(s) Inhaler risperiDONE   Tablet methylPREDNISolone sodium succinate Injectable phenylephrine    Infusion collagenase Ointment aMIOdarone    Tablet Physical Examination:   Height & Weight: · Height (FEET)	6 Feet(1) · Height (INCHES)	0 Inch(s)(1) · Height (CENTIMETERS)	182.88 Centimeter(s) · Dosing Weight (KILOGRAMS)	63 kg(1) · Dosing Weight  (POUNDS)	138.8 Pound(s) · BSA (m2)	1.82 Meter Squared · BMI (kG/m2)	18.8   Vital Signs (most recent values in past 4 hours): · Temp (C)	  38.1 Degrees C(2) · Heart Rate	80 /min(3) · Respiration Rate (breaths/min)	16 /min(3) · BP Systolic	  96 mm Hg(4) · BP Diastolic	  54 mm Hg(4) · Mean (mm Hg)	69 (4)  SpO2 & Oxygen Therapy: ·  SpO2: 96 %.(5) ·  Oxygen delivery method: ventilator.(5)   Ventilator/BiPAP/CPAP:  Mechanical Vent Record:   17-Aug-2021 12:14 · Ventilator Mode	AC/ CMV (Assist Control/ Continuous Mandatory Ventilation) · Ventilator Set Respiratory Rate	16 · Ventilator: Total Rate (breaths/min) Total Rate (breaths/min)	16 · Ventilator Set Tidal Volume	550 · Ventilator Minute Ventilation Total (L/min)	7.9 · Ventilator: PEEP PEEP/CPAP (cm H20)	5 · Ventilator: FiO2 Oxygen Concentration (%)	50 · Ventilator: Peak Airway Pressure (cm H2O) Peak Airway Pressure	23 · Ventilator: Mean Airway Pressure (cm H2O) Mean Airway Pressure (cm H2O)	10 Laboratory Results:  Blood Bank:  16-Aug-2021 02:31 Type + Screen Result	Value	Range ABO RH Interpretation	A POS	 General Chemistry:  13-Aug-2021 17:41 A1C with Estimated Average Glucose Result	Value	Range A1C with Estimated Average Glucose Result	5.8	[4.0 - 5.6 %] Estimated Average Glucose	120	[68 - 114 mg/dL] 15-Aug-2021 23:35 POCT  Blood Glucose Result	Value	Range POCT Blood Glucose.	122	[70 - 99 mg/dL] 16-Aug-2021 16:27 Comprehensive Metabolic Panel Result	Value	Range Protein Total, Serum	6.0	[6.6 - 8.7 g/dL] Albumin, Serum	3.1	[3.3 - 5.2 g/dL] Alkaline Phosphatase, Serum	99	[40 - 120 U/L] 16-Aug-2021 16:27 Phosphorus Level, Serum Result	Value	Range Phosphorus Level, Serum	3.6	[2.4 - 4.7 mg/dL] 17-Aug-2021 02:22 Basic Metabolic Panel Result	Value	Range Sodium, Serum	142	[135 - 145 mmol/L] Potassium, Serum	4.1	[3.5 - 5.3 mmol/L] Chloride, Serum	106	[98 - 107 mmol/L] Carbon Dioxide, Serum	29.0	[22.0 - 29.0 mmol/L] Anion Gap, Serum	7	[5 - 17 mmol/L] Blood Urea Nitrogen, Serum	20.0	[8.0 - 20.0 mg/dL] Creatinine, Serum	0.49	[0.50 - 1.30 mg/dL] Glucose, Serum	123	[70 - 99 mg/dL] Calcium, Total Serum	9.8	[8.6 - 10.2 mg/dL] eGFR if Non African American	123	[>=60 mL/min/1.73M2] eGFR if African American	143	[>=60 mL/min/1.73M2] Coagulation:  16-Aug-2021 16:27 Activated Partial Thromboplastin Time Result	Value	Range Activated Partial Thromboplastin Time	20.5	[27.5 - 35.5 sec] 16-Aug-2021 16:27 Prothrombin Time and INR, Plasma Result	Value	Range Prothrombin Time, Plasma	13.6	[10.6 - 13.6 sec] INR	1.18	[0.88 - 1.16 ratio] Hematology:  13-Aug-2021 17:41 Manual Differential Result	Value	Range Platelet Morphology	Normal	[Normal] Giant Platelets	Present	 17-Aug-2021 02:22 Complete Blood Count + Automated Diff Result	Value	Range WBC Count	13.05	[3.80 - 10.50 K/uL] Hemoglobin	10.5	[13.0 - 17.0 g/dL] Hematocrit	33.4	[39.0 - 50.0 %] Platelet Count - Automated	289	[150 - 400 K/uL]     EXAM: CT ANGIO CHEST PULM ART [facility]   PROCEDURE DATE: 08/13/2021    INTERPRETATION: CLINICAL INFORMATION: Cancer. Pneumonia. Respiratory distress. Evaluate for pulmonary embolism  COMPARISON: CT chest 7/7/2021.  CONTRAST/COMPLICATIONS: IV Contrast: Omnipaque 350 90 cc administered 10 cc discarded Oral Contrast: NONE Complications: None reported at time of study completion  PROCEDURE: CT Angiography of the Chest. Sagittal and coronal reformats were performed as well as 3D (MIP) reconstructions.  FINDINGS:  LUNGS AND AIRWAYS: There is marked narrowing of the bilateral mainstem bronchus. The right lower lobe bronchus intermedius is occluded and there is distal impaction of the bronchi. There is no associated atelectasis. Severe emphysema. 3.2 x 2.7 cm right middle lobe mass is stable. 4 mm right lower lobe nodule (2:90), unchanged. Seen right lower lobe opacity has resolved. There are a few scattered calcified subcentimeter granulomas. PLEURA: No pleural effusion. MEDIASTINUM AND RAULITO: Right paratracheal lymph node measures 3.3 x 2.9 cm previously 2.8 x 2.4 cm. A subcarinal mass measures approximately 6.1 x 5.0 cm previously 4.8 x 4.7 cm. VESSELS: There is no pulmonary embolism. Subcarinal mediastinal mass mildly narrows the right pulmonary artery which remains patent. HEART: Heart size is normal. No pericardial effusion. CHEST WALL AND LOWER NECK: Previously seen right posterior 3.3 cm mass is no longer present. VISUALIZED UPPER ABDOMEN: 3.0 cm indeterminant hypodense and is in size. Marked thickening of the left adrenal gland is stable. BONES: No changes.  IMPRESSION: No pulmonary embolism.  Enlarging mediastinal masses which narrow the airways are as described above.  Stable right middle lobe mass.   MI NICHOLSON MD; Attending Radiologist This document has been electronically signed. Aug 13 2021 1:08AM BEXAM: CT ABDOMEN AND PELVIS IC  PROCEDURE DATE: 07/08/2021    INTERPRETATION: CLINICAL INFORMATION: Lung mass for metastatic workup staging  COMPARISON: Chest CT 1 day prior  CONTRAST/COMPLICATIONS: IV Contrast: Omnipaque 300 95 cc administered 5 cc discarded Oral Contrast: NONE Complications: None reported at time of study completion  PROCEDURE: CT of the Abdomen and Pelvis was performed. Sagittal and coronal reformats were performed.  FINDINGS: LOWER CHEST: Please refer to chest CT from one day prior  LIVER: Scattered tiny hypodensities are indeterminate. BILE DUCTS: Nondilated. GALLBLADDER: Normal. SPLEEN: Normal. PANCREAS: Normal. ADRENALS: 2.3 cm indeterminate left adrenal nodule. KIDNEYS/URETERS: No calculi, hydronephrosis, or soft tissue attenuating mass. Bilateral cortical scarring and lobulation.  BLADDER: Underdistended limiting evaluation. REPRODUCTIVE ORGANS: Nonenlarged.  BOWEL: No bowel-related abnormality. PERITONEUM: No ascites or implants. VESSELS: Normal caliber aorta. RETROPERITONEUM/LYMPH NODES: No adenopathy. ABDOMINAL WALL: Normal. BONES: No aggressive lesion.  IMPRESSION: * Small indeterminate liver lesions. MRI may be helpful for complete evaluation. * 2.3 cm indeterminate left adrenal nodule.  REYNA GREER MD; Attending Radiologist This document has been electronically signed. Jul 8 2021 3:17PM Bookmarks  1   
Consult requested by: Dr. KT Zambrano, Palliative Care  Primary Attending: Dr. Azul Amaya, CT Surgery     Consultant: Alee Cassidy MS, MD, MPH, [facility] Medical Ethicist Contact # [phone] (C) [phone] and Adina Schwab, [facility] (Ethics Fellow)    Consult purpose: To assist in the ethical dilemma posed by a 56-year-old male with a recent diagnosis of stage 4 lung cancer, regarding decision making capacity and plan of care.    Clinical summary: This is an unbefriended and undomiciled 56-year-old male with a past medical history of schizophrenia vs bipolar disorder, anti personality disorder depression, COPD, former smoker (quit July 2021) and polysubstance abuse including marijuana and cocaine recently diagnosed (July 2021) poorly differentiated metastatic lung cancer (stage 4) having received 2 out of 10 fractions of radiation therapy as outpatient but then presented [facility] to [facility] for respiratory distress requiring intubation. He was found to have an endobronchial mass and was then transferred to [facility] for further treatment and management. CT Angio on 8/13/21 showed a 6.1cm subcarinal mass with compression of airway, a 3.2cm right middle lobe mass, and a 4 mm right lower lobe nodule as well as lymphadenopathy. CT abdomen on 7/8/21 with questionable metastatic sites to adrenal gland and liver.He underwent bronchial stent placement x 2 on 8/16/21 and 8/18/21. He was extubated on 8/19/21. Due to social circumstances oncology felt initiating systemic treatment would not be safe and recommended Palliative Consult. Radiation oncology consulted and recommended once stabilized it is best if he were to return to [facility] to continue palliative RT. Behavioral health consulted for behaviors and expression of SI, but ultimately deemed safe for discharge and no threat to self or others. Per Palliative Care note, patient understanding of his lung cancer and poor prognosis. Also noted by Palliative that patient understands that any treatments offered are palliative in nature. Laura, Rad Onc, Heme Onc, Behavioral Health and Palliative Care on consult. Patient with complex social issues including undomiciled, unemployed and lack of social support. Ethics consult initiated to assist team with this vulnerable patient with complex social issues regarding decision making capacity and plan of care.     PROGNOSIS ESTIMATE (SURVIVAL IN HRS, DAYS, WKS, MOS, YRS): poor, stage 4 lung cancer  PATIENT DECISION-MAKING CAPACITY:  Has Capacity  Patient Lacks Capacity   DIAGNOSIS:  Yes  No  Unknown		PROGNOSIS:  Yes  No  Unknown  NAME OF MEDICAL DECISION-MAKER SHOULD PATIENT LACK CAPACITY (RELATIONSHIP): unbefriended  ROLE:  Health Care Agent  Legal Surrogate 	CONTACT#:   OTHER DECISION-MAKER (I.E., [facility] OR SURROGATE) AWARE OF:   DIAGNOSIS:  Yes  No  Unknown 		PROGNOSIS:  Yes  No  Unknown  OTHER STAKEHOLDERS:   EVIDENCE OF PATIENT’S PREFERENCE OF LIFE-SUSTAINING TREATMENT (WRITTEN OR ORAL): During our conversation he stated he wants any treatment that will help him live as long as possible.  RESUSCITATION STATUS: DNR:  Yes  No  DNI: Yes  No    Discussions:   Discussion between LAILA Cassidy MD (Ethics Attending) and KT Zambrano MD (Palliative Care Attending) on 8/24/21: Case discussed in detail. Patient with complex social issues (undomiciled and possibly unbefriended) with prior psychiatric history including suicide attempt. Now with lung cancer. Notes from [facility] Palliative NP indicate family refusing contact with patient. At issue is capacity, surrogate and plan of care. Dr. Zambrano to follow up with CTS team.  Discussion between LAILA Cassidy MD (Ethics Attending) and BERTRAND Alarcon MD (Behavioral Health) on 8/24/21: Discussed case, psychiatric history and complex social issues. Capacity assessment ongoing.   Discussion between LAILA Cassidy MD and LAILA Bazzi LCSW (SW Manager) on 8/24/21: Discussed case and complex social issues.   Discussion between Mason (Adina Schwab, Ethics Fellow) and Dr. Amyaa (Primary Attending) on 8/24/2021: Discussed patient’s clinical condition and uncertainty of treatment options being offered at this point. Needs oncology and Rad onc follow up.  Discussion with [facility] field services (Jessie Bardales) and Adina Schwab (Ethics Fellow) on 8/24/21: There is no record of patient receiving any services under [facility] in the past.  Discussion with patient and Adina Schwab (Ethics Fellow) on 8/24/21: Mr. Rhodes was reasonable and has insight into his illness and his treatment options. He knows where he is and why he is here and the date. He understands the poor prognosis of his lung cancer explaining he was told he would live 6 months without treatment and 6mos-2 yrs with treatment. He expressed the will to live and he would like to pursue any option offered him that will help him live including chemotherapy and radiation if those were offered. He also is agreeable to go to a [facility] facility upon discharge, he just expressed not wanting to go back to [place]. We also discussed that he is very afraid of needles and he has had many needle pokes recently, showing me his arms with multiple bruises and scabs, and that's why he doesn't want any more IV's. I asked him if the team felt it was better to give him IV medications versus oral ones, i.e. for an infection, if he would consider a more permanent kind of line that goes into a larger vein and doesn't have to be changed as often, and he said he would need to discuss it with the doctors and get more information and that he would need to think about it. He would like to have further discussions regarding his treatment options.      Bioethical analysis: The conflict presented in this case pertains mainly to the patient’s autonomy, but also to the practitioner’s duty to beneficence and nonmaleficence.  The principle of respect for autonomous persons acknowledges a patient’s right to hold views, make choices, and take actions based on their values and beliefs(i). Furthermore, this principle recognizes the patient’s dignity and bodily integrity(i). Essential to this principle is the capacity for autonomous choice(i). In other words, the patient’s ability to decide what can and cannot be done to him according to his set of values. However, to preserve and empower this capacity to make choices, we must assess if the patient can do so. If not, we must find the appropriate alexandra of the patient’s autonomy.  Capacity is assessed using, among other tests, the CUAR test(ii). It requires the patient to (1) be able to communicate a choice (communication); (2) comprehend the facts, management, and alternatives about his condition (understanding); (3) acknowledge his medical condition and likely consequences of treatment options (appreciation); and (4) engage in a rational process of manipulating the relevant information (reasoning). If a patient lacks any of these four criteria, we should find the appropriate alexandra of the patient’s autonomy. That way, we preserve the respect for the autonomous patient and protect him from the consequences of a decision resulting in harm. Of note, capacity is decision specific, and it can wax and wane over time depending on factors such as mood, medical condition, nutritional status, and delirium. Also, practitioners should continuously optimize a patient’s capacity, to the extent possible return them to a baseline capacity, to help them make medical decisions for themselves. They should be allowed to make their own health care decisions if they can do so.  In this case, Mr. Rhodes demonstrated insight into his into his lung cancer diagnosis as well as his poor prognosis. He was clear that he wanted to pursue any offered treatment that will help him live. He also is agreeable to discharge to [facility] once medically ready.  The clinician’s domain of VIRTUE is the source of the bioethical principles of beneficence and nonmaleficence –both of which should be aimed to honor a patient’s moral status: wanting to help a patient live optimally, while avoiding inflicting harm (which may be experienced psychologically and physically)(i). Thus, the health care team must show respect for Mr. Rhodes as a person with dignity and a sense of self. We should adhere to the principles of beneficence and nonmaleficence. Nonmaleficence obligates us to refrain from causing harm to patients while treating them, while beneficence requires us to promote the best possible health or quality of life –in other words, to remediate a harm and to maximize benefit for the patient’s health and well-being.(iii)     In this case, due to Mr. Rhodes complex social issues, any treatment offered needs to be weighed against the risk of that treatment in this setting i.e. difficulty maintaining consistent follow up visits. Mr. Rhodes has expression of PD-L1 in his tumor, and an oral treatment like an immune checkpoint inhibitor that targets the cell surface receptor PD-1 could be considered. The benefit-to-risk profile suggests that a PD-1 inhibitor can be extended as first-line therapy to patients with locally advanced or metastatic non-small-cell lung even with low PD-L1 tumour proportion score (TPS)(v).  Additionally, directly interconnected to a patient’s autonomy and the medical team’s beneficence and nonmaleficence’s duties is the trust that must permeate the clinician-patient relationship. In practical terms, the clinicians have a duty to place themselves in the lived experience of the patient’s illness, in order to appreciate the situation as the patient understands, perceives, and feels it (it is both to understand and to be understanding).(iii)  And communication is central in the practitioner-patient relationship.(iv) This is not only owed to patients. It also extends to a patient’s surrogate decision-maker when a patient loses capacity. Sometimes patient’s can create several concerns for the medical team. Patient’s are sometimes thrown to a difficult situation and can bring their fears and anxieties to an already delicate situation. Understanding the root of the problem can help diffuse these difficult moments. Practical tools such as extensive communication with the surrogate and active listening can help in this regard.  Lastly, social justice, sometimes referred to as distributive justice, refers to the equitable distribution and utilization of treatment and resources that maximizes benefit to society and the respect to vulnerable populations, while considering the vulnerability of patients with special needs. In this case, particular attention needs to be paid to certain social determinants of health (SDOH) including homelessness, unemployed, lack of social support and potential social and/or moral health disparity. Social determinants of health refer to both specific features of and pathways by which societal conditions affect health and that potentially can be altered by informed action.(vi)  
PULMONARY CONSULT NOTE      RENY CHAPPELL  MRN-[id number]    Patient is a 56y old  Male who presents with a chief complaint of     HISTORY OF PRESENT ILLNESS:  56M PMH undomiciled, smoker, polysubstance abuse (cocaine, THC), COPD, depression, R back chest walla mass s/p biopsy (poorly differentiated CA), subcarinal lung mass who presented with worsening SOB. Notably was D/C from [facility] 1 week PTA. On the morning of admission to [facility] he reports extreme SOB, chest pain, palpitations, diaphoresis. Per documentation had hemoptysis since 21. He has also experienced 10-20 pound weight loss over the past month. Has not yet had f/u with heme/onc. On  the evening of admission was intubated for worsening respiratory failure. Unable to obtain further history as pt intubated.    MEDICATIONS  (STANDING):  albuterol/ipratropium for Nebulization 3 milliLiter(s) Nebulizer every 6 hours  chlorhexidine 0.12% Liquid 15 milliLiter(s) Oral Mucosa every 12 hours  fentaNYL   Infusion. 0.5 MICROgram(s)/kG/Hr (3.15 mL/Hr) IV Continuous <Continuous>  methylPREDNISolone sodium succinate Injectable 60 milliGRAM(s) IV Push every 8 hours  pantoprazole  Injectable 40 milliGRAM(s) IV Push daily  propofol Infusion 40 MICROgram(s)/kG/Min (15.1 mL/Hr) IV Continuous <Continuous>  sodium chloride 0.9% lock flush 3 milliLiter(s) IV Push every 8 hours    MEDICATIONS  (PRN):    Allergies    Allergy Status Unknown    Intolerances    paper plates/tray and plastic utensils only (Unknown)    PAST MEDICAL & SURGICAL HISTORY:  Schizophrenia    Bipolar disorder    Depression    Lung cancer    Polysubstance abuse    No significant past surgical history      FAMILY HISTORY:  FH: diabetes mellitus (Mother)    FH: lung cancer  father          SOCIAL HISTORY  Smoking History:   Former smoker    REVIEW OF SYSTEMS:  Unable to obtain ROS given intubated      Vital Signs Last 24 Hrs  T(C): 36.1 (14 Aug 2021 08:00), Max: 37.2 (13 Aug 2021 21:00)  T(F): 97 (14 Aug 2021 08:00), Max: 98.9 (13 Aug 2021 21:00)  HR: 134 (14 Aug 2021 09:00) (90 - 134)  BP: 121/85 (14 Aug 2021 09:00) (81/54 - 166/120)  BP(mean): 98 (14 Aug 2021 09:00) (62 - 138)  RR: 14 (14 Aug 2021 09:00) (12 - 44)  SpO2: 95% (14 Aug 2021 09:00) (88% - 100%)      PHYSICAL EXAMINATION:  GENERAL: Intubated, sedated  HEENT: NC/AT  NECK: Supple  LUNGS: Coarse mechanical breath sounds B/L  CV: +S1, S2  ABDOMEN: Soft, non-tender  EXTREMITIES: No rashes, lesions, edema  NEUROLOGIC: Sedated      LABS:                        12.4   18.62 )-----------( 412      ( 14 Aug 2021 05:23 )             39.1     08-14    140  |  98  |  25.8<H>  ----------------------------<  152<H>  5.0   |  31.0<H>  |  0.78    Ca    10.2      14 Aug 2021 05:23  Phos  4.2     08-13  Mg     2.3     08-14    TPro  6.4<L>  /  Alb  3.4  /  TBili  0.3<L>  /  DBili  x   /  AST  17  /  ALT  27  /  AlkPhos  120  08-14    PT/INR - ( 13 Aug 2021 17:41 )   PT: 15.5 sec;   INR: 1.36 ratio         PTT - ( 13 Aug 2021 17:41 )  PTT:30.4 sec  Urinalysis Basic - ( 13 Aug 2021 23:42 )    Color: Yellow / Appearance: Clear / S.015 / pH: x  Gluc: x / Ketone: Negative  / Bili: Negative / Urobili: 1 mg/dL   Blood: x / Protein: 15 mg/dL / Nitrite: Negative   Leuk Esterase: Negative / RBC: 0-2 /HPF / WBC Negative   Sq Epi: x / Non Sq Epi: x / Bacteria: x      ABG - ( 14 Aug 2021 04:14 )  pH, Arterial: 7.450 pH, Blood: x     /  pCO2: 49    /  pO2: 115   / HCO3: 34    / Base Excess: 10.1  /  SaO2: 99.5              CARDIAC MARKERS ( 12 Aug 2021 18:47 )  <0.015 ng/mL / x     / x     / x     / x      CARDIAC MARKERS ( 12 Aug 2021 15:11 )  <0.015 ng/mL / x     / x     / x     / x            Serum Pro-Brain Natriuretic Peptide: 264 pg/mL (21 @ 17:41)          MICROBIOLOGY:  COVID-19 Sandoval Domain Antibody (21 @ 05:56)    COVID-19 Sandoval Domain Antibody Result: 0.40: Roche ECLIA Total AB (SHABANA)  NOTE: This result index represents a total antibody measurement, which  includes IgG, IgA and IgM.  Measures Receptor Binding Domain of the Sandoval Protein  Negative <= 0.79 U/mL  Positive  >= 0.80 U/mL U/mL    COVID-19 Sandoval Domain AB Interp: Negative: This test has been authorized for emergency use by the FDA. [employer] has validated this test to be accurate.  Results from antibody testing should not be used to inform infection  status.  A positive result is consistent with vaccination, prior infection, or  rarely be due to past or present infection with non-SARS-CoV-2  coronavirus strains, such as  coronavirus HKU1,  NL63, OC43, A3 or 229E.  A negative result does not rule out SARS-CoV-2 infection, particularly in  those who have been in recent contact with the virus.          RADIOLOGY & ADDITIONAL STUDIES:  < from: CT Angio Chest PE Protocol w/ IV Cont (21 @ 00:21) >  EXAM:  CT ANGIO CHEST PULM [facility]                            PROCEDURE DATE:  2021          INTERPRETATION:  CLINICAL INFORMATION: Cancer. Pneumonia. Respiratory distress. Evaluate for pulmonary embolism    COMPARISON: CT chest 2021.    CONTRAST/COMPLICATIONS:  IV Contrast: Omnipaque 350  90 cc administered   10 cc discarded  Oral Contrast: NONE  Complications: None reported at time of study completion    PROCEDURE:  CT Angiography of the Chest.  Sagittal and coronal reformats wereperformed as well as 3D (MIP) reconstructions.    FINDINGS:    LUNGS AND AIRWAYS: There is marked narrowing of the bilateral mainstem bronchus. The right lower lobe bronchus intermedius is occluded and there is distal impaction of the bronchi. There is no associated atelectasis. Severe emphysema. 3.2 x 2.7 cm right middle lobe mass is stable. 4 mm right lower lobe nodule (2:90), unchanged. Seen right lower lobe opacity has resolved. There are a few scattered calcified subcentimeter granulomas.  PLEURA: No pleural effusion.  MEDIASTINUM AND RAULITO: Right paratracheal lymph node measures 3.3 x 2.9 cm previously 2.8 x 2.4 cm. A subcarinal mass measures approximately 6.1 x 5.0 cm previously 4.8 x 4.7 cm.  VESSELS: There is no pulmonary embolism. Subcarinal mediastinal mass mildly narrows the right pulmonary artery which remains patent.  HEART: Heart size is normal. No pericardial effusion.  CHEST WALL AND LOWER NECK: Previously seen right posterior 3.3 cm mass is no longer present.  VISUALIZED UPPER ABDOMEN: 3.0 cm indeterminant hypodense and is in size. Marked thickening of the left adrenal gland is stable.  BONES: No changes.    IMPRESSION:  No pulmonary embolism.    Enlarging mediastinal masses which narrow the airways are as described above.    Stable right middle lobe mass.        --- End of Report ---            MI NICHOLSON MD; Attending Radiologist  This document has been electronically signed. Aug 13 2021  1:08AM    < end of copied text >      ECHO:  < from: TTE Echo Complete w/o Contrast w/ Doppler (21 @ 08:04) >   Impression     Summary     Mild aortic sclerosis is present with normal valvular opening.   Normal appearing tricuspid valve structure and function.   Trace tricuspid valve regurgitation is present.   Endocardium is not ALWAYS well visualized, however, overall left   ventricular systolic function appears normal. Technically Difficult Study.   Estimated left ventricular ejection fraction is 55-60 %.   Normal appearing right ventricle structure and function.   Trace pericardial effusion cannot be ruled out.     Signature     ----------------------------------------------------------------   Electronically signed by Venugopal Palla, MD(Interpreting   physician) on 2021 05:57 PM   ----------------------------------------------------------------    Valves     Mitral Valve     Peak E-Wave: 110 cm/s  Peak Gradient: 4.84 mmHg     Aortic Valve     Peak Velocity: 115 cm/s   Peak Gradient: 5.29 mmHg     Cusp Separation: 1.8 cm     Tricuspid Valve     TR Velocity: 238 cm/s                  Estimated RAP: 5 mmHg   TR Gradient: 22.6576 mmHgEstimated RVSP: 28 mmHg     Pulmonic Valve              Estimated PASP: 27.66 mmHg    Structures     Left Atrium     LA Dimension: 3.1 cm          LA Area: 14.4 cm^2   LA/Aorta: 1                   LA Volume/Index: 39 ml /20m^2     Left Ventricle    Diastolic Dimension: 4.16 cm          Systolic Dimension: 3.01 cm   Septum Diastolic: 1 cm   PW Diastolic: 0.75 cm     FS: 27.64 %     Right Atrium     RA Systolic Pressure: 5 mmHg     Right Ventricle              RV Systolic Pressure: 27.66 mmHg    Miscellaneous     Aorta     Aortic Root: 3.1 cm                VENUGOPAL PALLA MD; Attending Cardiologist  This document has been electronically signed. Aug 13 2021  5:57PM    < end of copied text >

## 2021-08-25 NOTE — PROGRESS NOTE ADULT - ASSESSMENT
56M homeless with a PMH of smoker (quit July 2021), polysubstance abuse (cocaine, marijuana), COPD, depression, recently diagnosed with lung cancer, has been receiving RT at [facility]. He was discharge from [facility] to [place] 8/6/21. He then presented to  8/13 from [place] due to respiratory distress requiring intubation. HE was found to have an endobronchial mass and was then transferred to [facility] for further care. He underwent FB and left mainstem bronchial stenting with Dr. Amaya on 8/16, then RTOR for right mainstem stent 8/18. Inpatient course has been complicated by suicidal ideations requiring constant observation,  consulted. Otherwise, he was seen by Heme/Onc who requested palliative care given patient is likely not a candidate for chemotherapy due to social issues.  56M homeless with a PMH of smoker (quit July 2021), polysubstance abuse (cocaine, marijuana), COPD, depression, recently diagnosed with lung cancer, has been receiving RT at [facility]. He was discharge from [facility] to [place] 8/6/21. He then presented to  8/13 from [place] due to respiratory distress requiring intubation. HE was found to have an endobronchial mass and was then transferred to [facility] for further care. He underwent FB and left mainstem bronchial stenting with Dr. Amaya on 8/16, then RTOR for right mainstem stent 8/18. Inpatient course has been complicated by suicidal ideations requiring constant observation,  consulted. Otherwise, he was seen by Heme/Onc who requested palliative care given patient is likely not a candidate for chemotherapy due to social issues.

## 2021-08-26 LAB — SARS-COV-2 RNA SPEC QL NAA+PROBE: SIGNIFICANT CHANGE UP

## 2021-08-26 PROCEDURE — 93010 ELECTROCARDIOGRAM REPORT: CPT

## 2021-08-26 PROCEDURE — 99231 SBSQ HOSP IP/OBS SF/LOW 25: CPT

## 2021-08-26 RX ADMIN — AMIODARONE HYDROCHLORIDE 200 MILLIGRAM(S): 400 TABLET ORAL at 05:37

## 2021-08-26 RX ADMIN — Medication 3 MILLILITER(S): at 20:07

## 2021-08-26 RX ADMIN — Medication 1 TABLET(S): at 13:08

## 2021-08-26 RX ADMIN — Medication 30 MILLIGRAM(S): at 05:38

## 2021-08-26 RX ADMIN — Medication 3 MILLILITER(S): at 14:53

## 2021-08-26 RX ADMIN — Medication 1 PATCH: at 19:23

## 2021-08-26 RX ADMIN — Medication 3 MILLILITER(S): at 02:49

## 2021-08-26 RX ADMIN — Medication 1 TABLET(S): at 05:37

## 2021-08-26 RX ADMIN — Medication 1 TABLET(S): at 18:11

## 2021-08-26 RX ADMIN — BUDESONIDE AND FORMOTEROL FUMARATE DIHYDRATE 2 PUFF(S): 160; 4.5 AEROSOL RESPIRATORY (INHALATION) at 09:55

## 2021-08-26 RX ADMIN — Medication 500 MILLIGRAM(S): at 13:08

## 2021-08-26 RX ADMIN — Medication 3 MILLILITER(S): at 09:55

## 2021-08-26 RX ADMIN — Medication 1 PATCH: at 07:44

## 2021-08-26 RX ADMIN — Medication 650 MILLIGRAM(S): at 09:30

## 2021-08-26 RX ADMIN — BUDESONIDE AND FORMOTEROL FUMARATE DIHYDRATE 2 PUFF(S): 160; 4.5 AEROSOL RESPIRATORY (INHALATION) at 20:07

## 2021-08-26 RX ADMIN — Medication 1 APPLICATION(S): at 13:08

## 2021-08-26 RX ADMIN — Medication 650 MILLIGRAM(S): at 08:38

## 2021-08-26 NOTE — PROGRESS NOTE ADULT - SUBJECTIVE AND OBJECTIVE BOX
SUBJECTIVE:  Pt OOB to the chair. Pt states, " I am not a morning person"  Pt c/o of intermittent SOB on exertion, pt denies any chest pain palpitations, N/V     Overnight events:  none    PAST MEDICAL & SURGICAL HISTORY:  Schizophrenia    Bipolar disorder    Depression    Lung cancer    Polysubstance abuse    No significant past surgical history        MEDICATIONS  acetaminophen   Tablet .. 650 milliGRAM(s) Oral every 6 hours PRN  albuterol/ipratropium for Nebulization 3 milliLiter(s) Nebulizer every 6 hours  aMIOdarone    Tablet 200 milliGRAM(s) Oral daily  amoxicillin  875 milliGRAM(s)/clavulanate 1 Tablet(s) Oral two times a day  ascorbic acid 500 milliGRAM(s) Oral daily  budesonide 160 MICROgram(s)/formoterol 4.5 MICROgram(s) Inhaler 2 Puff(s) Inhalation two times a day  cloNIDine Patch 0.3 mG/24Hr(s) 1 patch Transdermal every 7 days  collagenase Ointment 1 Application(s) Topical daily  enoxaparin Injectable 40 milliGRAM(s) SubCutaneous daily  multivitamin/minerals 1 Tablet(s) Oral daily  pantoprazole    Tablet 40 milliGRAM(s) Oral before breakfast  predniSONE   Tablet 30 milliGRAM(s) Oral daily  risperiDONE   Tablet 1 milliGRAM(s) Oral two times a day  MEDICATIONS  (PRN):  acetaminophen   Tablet .. 650 milliGRAM(s) Oral every 6 hours PRN Mild Pain (1 - 3)      Daily     Daily Weight in k.3 (26 Aug 2021 05:00)                      Objective:  T(C): 36.5 (21 @ 10:00), Max: 37 (21 @ 22:00)  HR: 82 (21 @ 10:00) (80 - 91)  BP: 110/68 (21 @ 10:00) (107/65 - 110/68)  RR: 19 (21 @ 10:00) (18 - 19)  SpO2: 98% (21 @ 10:00) (94% - 98%)  Wt(kg): --CAPILLARY BLOOD GLUCOSE      I&O's Summary    25 Aug 2021 07:01  -  26 Aug 2021 07:00  --------------------------------------------------------  IN: 0 mL / OUT: 550 mL / NET: -550 mL          PHYSICAL EXAM   General: NAD  Neuro: AxO x3, non-focal, ANGEL  Cardiac: S1S2, no murmurs  Pulm: b/l Rhonchi no wheezing or rales  Abdomen: Soft, NT, ND, normoactive BS  Peripheral: +DP pulses b/l, no peripheral edema   Imaging:  CXR:      ECG:               SUBJECTIVE:  Pt OOB to the chair. Pt states, " I am not a morning person"  Pt c/o of intermittent SOB on exertion, pt denies any chest pain palpitations, N/V     Overnight events:  none    PAST MEDICAL & SURGICAL HISTORY:  Schizophrenia    Bipolar disorder    Depression    Lung cancer    Polysubstance abuse    No significant past surgical history        MEDICATIONS  acetaminophen   Tablet .. 650 milliGRAM(s) Oral every 6 hours PRN  albuterol/ipratropium for Nebulization 3 milliLiter(s) Nebulizer every 6 hours  aMIOdarone    Tablet 200 milliGRAM(s) Oral daily  amoxicillin  875 milliGRAM(s)/clavulanate 1 Tablet(s) Oral two times a day  ascorbic acid 500 milliGRAM(s) Oral daily  budesonide 160 MICROgram(s)/formoterol 4.5 MICROgram(s) Inhaler 2 Puff(s) Inhalation two times a day  cloNIDine Patch 0.3 mG/24Hr(s) 1 patch Transdermal every 7 days  collagenase Ointment 1 Application(s) Topical daily  enoxaparin Injectable 40 milliGRAM(s) SubCutaneous daily  multivitamin/minerals 1 Tablet(s) Oral daily  pantoprazole    Tablet 40 milliGRAM(s) Oral before breakfast  predniSONE   Tablet 30 milliGRAM(s) Oral daily  risperiDONE   Tablet 1 milliGRAM(s) Oral two times a day  MEDICATIONS  (PRN):  acetaminophen   Tablet .. 650 milliGRAM(s) Oral every 6 hours PRN Mild Pain (1 - 3)      Daily     Daily Weight in k.3 (26 Aug 2021 05:00)                      Objective:  T(C): 36.5 (21 @ 10:00), Max: 37 (21 @ 22:00)  HR: 82 (21 @ 10:00) (80 - 91)  BP: 110/68 (21 @ 10:00) (107/65 - 110/68)  RR: 19 (21 @ 10:00) (18 - 19)  SpO2: 98% (21 @ 10:00) (94% - 98%)  Wt(kg): --CAPILLARY BLOOD GLUCOSE      I&O's Summary    25 Aug 2021 07:01  -  26 Aug 2021 07:00  --------------------------------------------------------  IN: 0 mL / OUT: 550 mL / NET: -550 mL          PHYSICAL EXAM   General: NAD  Neuro: AxO x3, non-focal, NAGEL  Cardiac: S1S2, no murmurs  Pulm: b/l Rhonchi no wheezing or rales  Abdomen: Soft, NT, ND, normoactive BS  Peripheral: +DP pulses b/l, no peripheral edema   Imaging:  CXR:  < from: Xray Chest 1 View- PORTABLE-Urgent (Xray Chest 1 View- PORTABLE-Urgent .) (21 @ 05:29) >  COMPARISON STUDY: 2021    Frontal expiratory view of the chest shows the heart to be similarin size. Left bronchial stent and right lung mass are again noted.    The lungs show minimal left base atelectasis and there is no evidence of pneumothorax nor definite pleural effusion.    IMPRESSION:  Minimal atelectasis.    Thank you for the courtesy of this referral.    --- End of Report ---    < end of copied text >

## 2021-08-26 NOTE — PROGRESS NOTE ADULT - ASSESSMENT
56M homeless with a PMH of smoker (quit July 2021), polysubstance abuse (cocaine, marijuana), COPD, depression, recently diagnosed with lung cancer, has been receiving RT at [facility]. He was discharge from [facility] to [place] 8/6/21. He then presented to  8/13 from [place] due to respiratory distress requiring intubation. HE was found to have an endobronchial mass and was then transferred to [facility] for further care. He underwent FB and left mainstem bronchial stenting with Dr. Amaya on 8/16, then RTOR for right mainstem stent 8/18. Inpatient course has been complicated by suicidal ideations requiring constant observation,  consulted. Otherwise, he was seen by Heme/Onc who requested palliative care given patient is likely not a candidate for chemotherapy due to social issues.

## 2021-08-26 NOTE — PROGRESS NOTE ADULT - PROBLEM SELECTOR PLAN 1
s/p Left Bronchial stenting on 8/16 and Rt Bronchial stenting on 8/18 with Dr Amaya.  Now on nasal O2.  Continue Duonebs and Symbicort  Solumedrol 30 BID   Tolerating diet.  Oncology follow up appreciated - palliative care consulted and sign off   -ideally pt should follow up at [facility] after discharge from the hospital. However, patient is homeless without social support, consistent followup during treatment could become an issue. Initiating chemotherapy in this situation would be unsafe for the patient  palliative consulted and now signed off  As per Dr. Sahu from [facility] pt can continue to f/u as outpatient   if no other treatment able to be offered Psych will need to evaluate and determine capacity for hospice   Dispo ALYX

## 2021-08-26 NOTE — PROGRESS NOTE ADULT - PROBLEM SELECTOR PLAN 5
Seen by psych 8/20  Placed on 1:1 for suicidal ideation  Most recent eval 8/21 patient no longer at risk  Agitated 8/22 and abusive towards staff.  Threatening to throw his full urinal at staff.  Demanding he gets his pocket knife back.  - constant observation kept in place  8/23 pt calm and restraints d/c'd and patient place in chair, Pt however refusing to have another IV placed. Pt understands he is getting antibiotic and maintains his refusal for any needles/ IVs  8/24 pt calm still refusing certain medications, no suicidal ideation noted  8/26 Pt remains calm, no suicidal ideation noted.

## 2021-08-27 ENCOUNTER — TRANSCRIPTION ENCOUNTER (OUTPATIENT)
Age: 56
End: 2021-08-27

## 2021-08-27 VITALS
TEMPERATURE: 98 F | RESPIRATION RATE: 18 BRPM | HEART RATE: 96 BPM | DIASTOLIC BLOOD PRESSURE: 76 MMHG | OXYGEN SATURATION: 97 % | SYSTOLIC BLOOD PRESSURE: 122 MMHG

## 2021-08-27 PROCEDURE — 82803 BLOOD GASES ANY COMBINATION: CPT

## 2021-08-27 PROCEDURE — 71045 X-RAY EXAM CHEST 1 VIEW: CPT | Mod: 26

## 2021-08-27 PROCEDURE — 94002 VENT MGMT INPAT INIT DAY: CPT

## 2021-08-27 PROCEDURE — 85610 PROTHROMBIN TIME: CPT

## 2021-08-27 PROCEDURE — 84100 ASSAY OF PHOSPHORUS: CPT

## 2021-08-27 PROCEDURE — 80074 ACUTE HEPATITIS PANEL: CPT

## 2021-08-27 PROCEDURE — 84295 ASSAY OF SERUM SODIUM: CPT

## 2021-08-27 PROCEDURE — 86901 BLOOD TYPING SEROLOGIC RH(D): CPT

## 2021-08-27 PROCEDURE — C1751: CPT

## 2021-08-27 PROCEDURE — 86769 SARS-COV-2 COVID-19 ANTIBODY: CPT

## 2021-08-27 PROCEDURE — 85014 HEMATOCRIT: CPT

## 2021-08-27 PROCEDURE — 86900 BLOOD TYPING SEROLOGIC ABO: CPT

## 2021-08-27 PROCEDURE — U0005: CPT

## 2021-08-27 PROCEDURE — 97530 THERAPEUTIC ACTIVITIES: CPT

## 2021-08-27 PROCEDURE — 83735 ASSAY OF MAGNESIUM: CPT

## 2021-08-27 PROCEDURE — 84132 ASSAY OF SERUM POTASSIUM: CPT

## 2021-08-27 PROCEDURE — 36415 COLL VENOUS BLD VENIPUNCTURE: CPT

## 2021-08-27 PROCEDURE — 81001 URINALYSIS AUTO W/SCOPE: CPT

## 2021-08-27 PROCEDURE — 94760 N-INVAS EAR/PLS OXIMETRY 1: CPT

## 2021-08-27 PROCEDURE — 82330 ASSAY OF CALCIUM: CPT

## 2021-08-27 PROCEDURE — 86850 RBC ANTIBODY SCREEN: CPT

## 2021-08-27 PROCEDURE — 97163 PT EVAL HIGH COMPLEX 45 MIN: CPT

## 2021-08-27 PROCEDURE — 82435 ASSAY OF BLOOD CHLORIDE: CPT

## 2021-08-27 PROCEDURE — 83036 HEMOGLOBIN GLYCOSYLATED A1C: CPT

## 2021-08-27 PROCEDURE — 82947 ASSAY GLUCOSE BLOOD QUANT: CPT

## 2021-08-27 PROCEDURE — 31720 CLEARANCE OF AIRWAYS: CPT

## 2021-08-27 PROCEDURE — 85730 THROMBOPLASTIN TIME PARTIAL: CPT

## 2021-08-27 PROCEDURE — 80048 BASIC METABOLIC PNL TOTAL CA: CPT

## 2021-08-27 PROCEDURE — 83880 ASSAY OF NATRIURETIC PEPTIDE: CPT

## 2021-08-27 PROCEDURE — P9045: CPT

## 2021-08-27 PROCEDURE — 71045 X-RAY EXAM CHEST 1 VIEW: CPT

## 2021-08-27 PROCEDURE — 80053 COMPREHEN METABOLIC PANEL: CPT

## 2021-08-27 PROCEDURE — 76000 FLUOROSCOPY <1 HR PHYS/QHP: CPT

## 2021-08-27 PROCEDURE — 99238 HOSP IP/OBS DSCHRG MGMT 30/<: CPT

## 2021-08-27 PROCEDURE — 85018 HEMOGLOBIN: CPT

## 2021-08-27 PROCEDURE — 94003 VENT MGMT INPAT SUBQ DAY: CPT

## 2021-08-27 PROCEDURE — U0003: CPT

## 2021-08-27 PROCEDURE — 93005 ELECTROCARDIOGRAM TRACING: CPT

## 2021-08-27 PROCEDURE — 36600 WITHDRAWAL OF ARTERIAL BLOOD: CPT

## 2021-08-27 PROCEDURE — 83605 ASSAY OF LACTIC ACID: CPT

## 2021-08-27 PROCEDURE — 94640 AIRWAY INHALATION TREATMENT: CPT

## 2021-08-27 PROCEDURE — 85027 COMPLETE CBC AUTOMATED: CPT

## 2021-08-27 PROCEDURE — C1874: CPT

## 2021-08-27 PROCEDURE — 97110 THERAPEUTIC EXERCISES: CPT

## 2021-08-27 PROCEDURE — 85025 COMPLETE CBC W/AUTO DIFF WBC: CPT

## 2021-08-27 PROCEDURE — 84134 ASSAY OF PREALBUMIN: CPT

## 2021-08-27 PROCEDURE — C1889: CPT

## 2021-08-27 PROCEDURE — 82962 GLUCOSE BLOOD TEST: CPT

## 2021-08-27 PROCEDURE — C1769: CPT

## 2021-08-27 RX ORDER — ACETYLCYSTEINE 200 MG/ML
4 VIAL (ML) MISCELLANEOUS
Qty: 0 | Refills: 0 | DISCHARGE
Start: 2021-08-27

## 2021-08-27 RX ORDER — RISPERIDONE 4 MG/1
1 TABLET ORAL
Qty: 0 | Refills: 0 | DISCHARGE
Start: 2021-08-27

## 2021-08-27 RX ORDER — AMIODARONE HYDROCHLORIDE 400 MG/1
1 TABLET ORAL
Qty: 0 | Refills: 0 | DISCHARGE
Start: 2021-08-27

## 2021-08-27 RX ORDER — BUDESONIDE AND FORMOTEROL FUMARATE DIHYDRATE 160; 4.5 UG/1; UG/1
2 AEROSOL RESPIRATORY (INHALATION)
Qty: 0 | Refills: 0 | DISCHARGE
Start: 2021-08-27

## 2021-08-27 RX ORDER — IPRATROPIUM/ALBUTEROL SULFATE 18-103MCG
3 AEROSOL WITH ADAPTER (GRAM) INHALATION EVERY 6 HOURS
Refills: 0 | Status: DISCONTINUED | OUTPATIENT
Start: 2021-08-27 | End: 2021-08-27

## 2021-08-27 RX ORDER — PANTOPRAZOLE SODIUM 20 MG/1
1 TABLET, DELAYED RELEASE ORAL
Qty: 0 | Refills: 0 | DISCHARGE
Start: 2021-08-27

## 2021-08-27 RX ORDER — MULTIVIT-MIN/FERROUS GLUCONATE 9 MG/15 ML
1 LIQUID (ML) ORAL
Qty: 0 | Refills: 0 | DISCHARGE
Start: 2021-08-27

## 2021-08-27 RX ORDER — COLLAGENASE CLOSTRIDIUM HIST. 250 UNIT/G
1 OINTMENT (GRAM) TOPICAL
Qty: 0 | Refills: 0 | DISCHARGE
Start: 2021-08-27

## 2021-08-27 RX ORDER — ACETYLCYSTEINE 200 MG/ML
4 VIAL (ML) MISCELLANEOUS THREE TIMES A DAY
Refills: 0 | Status: COMPLETED | OUTPATIENT
Start: 2021-08-27 | End: 2021-08-27

## 2021-08-27 RX ORDER — IPRATROPIUM/ALBUTEROL SULFATE 18-103MCG
3 AEROSOL WITH ADAPTER (GRAM) INHALATION
Qty: 0 | Refills: 0 | DISCHARGE
Start: 2021-08-27

## 2021-08-27 RX ORDER — ASCORBIC ACID 60 MG
1 TABLET,CHEWABLE ORAL
Qty: 0 | Refills: 0 | DISCHARGE
Start: 2021-08-27

## 2021-08-27 RX ORDER — ENOXAPARIN SODIUM 100 MG/ML
40 INJECTION SUBCUTANEOUS
Qty: 0 | Refills: 0 | DISCHARGE
Start: 2021-08-27

## 2021-08-27 RX ORDER — ACETAMINOPHEN 500 MG
2 TABLET ORAL
Qty: 0 | Refills: 0 | DISCHARGE
Start: 2021-08-27

## 2021-08-27 RX ADMIN — Medication 500 MILLIGRAM(S): at 09:26

## 2021-08-27 RX ADMIN — Medication 3 MILLILITER(S): at 00:59

## 2021-08-27 RX ADMIN — Medication 3 MILLILITER(S): at 15:00

## 2021-08-27 RX ADMIN — Medication 4 MILLILITER(S): at 09:26

## 2021-08-27 RX ADMIN — Medication 1 TABLET(S): at 17:36

## 2021-08-27 RX ADMIN — Medication 3 MILLILITER(S): at 09:26

## 2021-08-27 RX ADMIN — BUDESONIDE AND FORMOTEROL FUMARATE DIHYDRATE 2 PUFF(S): 160; 4.5 AEROSOL RESPIRATORY (INHALATION) at 09:27

## 2021-08-27 RX ADMIN — Medication 1 APPLICATION(S): at 09:26

## 2021-08-27 RX ADMIN — Medication 1 TABLET(S): at 09:26

## 2021-08-27 RX ADMIN — PANTOPRAZOLE SODIUM 40 MILLIGRAM(S): 20 TABLET, DELAYED RELEASE ORAL at 05:07

## 2021-08-27 RX ADMIN — Medication 4 MILLILITER(S): at 00:59

## 2021-08-27 RX ADMIN — Medication 1 TABLET(S): at 05:07

## 2021-08-27 RX ADMIN — Medication 4 MILLILITER(S): at 15:00

## 2021-08-27 RX ADMIN — AMIODARONE HYDROCHLORIDE 200 MILLIGRAM(S): 400 TABLET ORAL at 05:07

## 2021-08-27 RX ADMIN — Medication 30 MILLIGRAM(S): at 05:07

## 2021-08-27 NOTE — PROGRESS NOTE ADULT - SUBJECTIVE AND OBJECTIVE BOX
SUBJECTIVE:  Pt alert and oriented, Pt states, "I want to get out of this hospital"  Pt c/o of SOB, pt denies Chest pain, palpitations, N/V/D    Overnight events:  none    PAST MEDICAL & SURGICAL HISTORY:  Schizophrenia    Bipolar disorder    Depression    Lung cancer    Polysubstance abuse    No significant past surgical history    medications  acetaminophen   Tablet .. 650 milliGRAM(s) Oral every 6 hours PRN  acetylcysteine 20%  Inhalation 4 milliLiter(s) Inhalation three times a day  albuterol/ipratropium for Nebulization 3 milliLiter(s) Nebulizer every 6 hours  aMIOdarone    Tablet 200 milliGRAM(s) Oral daily  amoxicillin  875 milliGRAM(s)/clavulanate 1 Tablet(s) Oral two times a day  ascorbic acid 500 milliGRAM(s) Oral daily  budesonide 160 MICROgram(s)/formoterol 4.5 MICROgram(s) Inhaler 2 Puff(s) Inhalation two times a day  cloNIDine Patch 0.3 mG/24Hr(s) 1 patch Transdermal every 7 days  collagenase Ointment 1 Application(s) Topical daily  enoxaparin Injectable 40 milliGRAM(s) SubCutaneous daily  multivitamin/minerals 1 Tablet(s) Oral daily  pantoprazole    Tablet 40 milliGRAM(s) Oral before breakfast  predniSONE   Tablet 30 milliGRAM(s) Oral daily  risperiDONE   Tablet 1 milliGRAM(s) Oral two times a day  MEDICATIONS  (PRN):  acetaminophen   Tablet .. 650 milliGRAM(s) Oral every 6 hours PRN Mild Pain (1 - 3)      Daily     Daily Weight in k.3 (27 Aug 2021 04:25)                      Objective:  T(C): 36.9 (21 @ 07:37), Max: 37 (21 @ 20:00)  HR: 88 (21 @ 09:20) (85 - 885)  BP: 111/70 (21 @ 07:37) (111/70 - 128/77)  RR: 18 (21 @ 07:37) (18 - 18)  SpO2: 98% (21 @ 09:20) (95% - 100%)  Wt(kg): --CAPILLARY BLOOD GLUCOSE      I&O's Summary    26 Aug 2021 07:01  -  27 Aug 2021 07:00  --------------------------------------------------------  IN: 480 mL / OUT: 1950 mL / NET: -1470 mL    27 Aug 2021 07:01  -  27 Aug 2021 11:18  --------------------------------------------------------  IN: 0 mL / OUT: 400 mL / NET: -400 mL        PHYSICAL EXAM   General: NAD  Neuro: AxO x3, non-focal, NAGEL  Cardiac: S1S2, no murmurs  Pulm: b/l Rhonchi, w/ right crackles at the base   Abdomen: Soft, NT, ND, normoactive BS  Peripheral: +DP pulses b/l, no peripheral edema       Imaging:    Imaging:  CXR:

## 2021-08-27 NOTE — PROGRESS NOTE ADULT - PROBLEM SELECTOR PLAN 6
As above.  Suicidal ideation and on 1:1 per psych recommendation.  Refused Risperdal today.  Asked Psych to follow up today.    Discussed with Dr. Amaya.
As above.      Discussed with Dr. Amaya and thoracic team in AM
As above.      Discussed with Dr. Amaya.
As above.      Discussed with Dr. Amaya and thoracic team in AM
As above.      Discussed with Dr. Amaya.

## 2021-08-27 NOTE — PROGRESS NOTE ADULT - PROBLEM SELECTOR PLAN 3
Post op Afib  Cont Amiodarone maintenance dose  Monitor I/O and Urine output, replace lytes PRN.
Post op Afib  Cont Amiodarone maintenance dose  Monitor I/O and Urine output, replace lytes PRN.
Post op Afib  Cont Amio load PO  Daily EKG  Monitor I/O and Urine output, replace lytes PRN.
Pantoprazole for GI prophylaxis  SCDs for DVT prophylaxis
Post op Afib  Cont Amiodarone maintenance dose  Daily EKG  Monitor I/O and Urine output, replace lytes PRN.
Post op Afib  Cont Amiodarone maintenance dose  Daily EKG  Monitor I/O and Urine output, replace lytes PRN.
Post op Afib  -Currently rate controlled Afib (80-90), maintaining stable HD  Cont Amio PO and monitor for conversion to NSR  Daily EKG  Monitor I/O and Urine output, replace lytes PRN.  If remains in Afib, will consider AC plan. To be discussed in AM with Thoracic team.
Post op Afib  Cont Amiodarone maintenance dose  Daily EKG  Monitor I/O and Urine output, replace lytes PRN.
Pantoprazole for GI prophylaxis  SCDs, Lovenox for DVT prophylaxis
Post op Afib  -Coverted to NSR overnight  Cont Amio load.  Daily EKG  Monitor I/O and Urine output, replace lytes PRN.
Post op Afib  Cont Amiodarone maintenance dose  Daily EKG  Monitor I/O and Urine output, replace lytes PRN.
Post op Afib  Cont Amio   Daily EKG  Monitor I/O and Urine output, replace lytes PRN.
Pantoprazole for GI prophylaxis  SCDs, Lovenox for DVT prophylaxis
Post op Afib, now back in NSR  Cont Amio load.  Daily EKG  Monitor I/O and Urine output, replace lytes PRN.

## 2021-08-27 NOTE — PROGRESS NOTE ADULT - PROBLEM SELECTOR PROBLEM 1
Endobronchial mass

## 2021-08-27 NOTE — DISCHARGE NOTE NURSING/CASE MANAGEMENT/SOCIAL WORK - PATIENT PORTAL LINK FT
You can access the FollowMyHealth Patient Portal offered by [facility] by registering at the following website: [email or web address]. By joining [employer]’s FollowMyHealth portal, you will also be able to view your health information using other applications (apps) compatible with our system.

## 2021-08-27 NOTE — DISCHARGE NOTE PROVIDER - NSDCMRMEDTOKEN_GEN_ALL_CORE_FT
acetaminophen 325 mg oral tablet: 2 tab(s) orally every 6 hours, As needed, Mild Pain (1 - 3)  acetylcysteine 20% inhalation solution: 4 milliliter(s) inhaled 3 times a day  albuterol 90 mcg/inh inhalation aerosol: 1 puff(s) inhaled every 4 hours  amiodarone 200 mg oral tablet: 1 tab(s) orally once a day  ascorbic acid 500 mg oral tablet: 1 tab(s) orally once a day  budesonide-formoterol 160 mcg-4.5 mcg/inh inhalation aerosol: 2 puff(s) inhaled 2 times a day  Cardizem 60 mg oral tablet: 1 tab(s) orally 3 times a day  cloNIDine 0.3 mg/24 hr transdermal film, extended release: 1 patch transdermal every 7 days  collagenase 250 units/g topical ointment: 1 application topically once a day  enoxaparin: 40 unit(s) subcutaneous once a day  ipratropium-albuterol 0.5 mg-2.5 mg/3 mL inhalation solution: 3 milliliter(s) inhaled every 6 hours  Multiple Vitamins with Minerals oral tablet: 1 tab(s) orally once a day  pantoprazole 40 mg oral delayed release tablet: 1 tab(s) orally once a day (before a meal)  predniSONE 10 mg oral tablet: 3 tab(s) orally once a day  risperiDONE 1 mg oral tablet: 1 tab(s) orally 2 times a day  tiotropium 18 mcg inhalation capsule: 1 cap(s) inhaled once a day  traZODone 100 mg oral tablet: 1 tab(s) orally once a day (at bedtime)   acetaminophen 325 mg oral tablet: 2 tab(s) orally every 6 hours, As needed, Mild Pain (1 - 3)  acetylcysteine 20% inhalation solution: 4 milliliter(s) inhaled 3 times a day  albuterol 90 mcg/inh inhalation aerosol: 1 puff(s) inhaled every 4 hours  amiodarone 200 mg oral tablet: 1 tab(s) orally once a day  ascorbic acid 500 mg oral tablet: 1 tab(s) orally once a day  budesonide-formoterol 160 mcg-4.5 mcg/inh inhalation aerosol: 2 puff(s) inhaled 2 times a day  Cardizem 60 mg oral tablet: 1 tab(s) orally 3 times a day  cloNIDine 0.3 mg/24 hr transdermal film, extended release: 1 patch transdermal every 7 days  collagenase 250 units/g topical ointment: 1 application topically once a day  enoxaparin: 40 unit(s) subcutaneous once a day  ipratropium-albuterol 0.5 mg-2.5 mg/3 mL inhalation solution: 3 milliliter(s) inhaled every 6 hours  Multiple Vitamins with Minerals oral tablet: 1 tab(s) orally once a day  pantoprazole 40 mg oral delayed release tablet: 1 tab(s) orally once a day (before a meal)  predniSONE 10 mg oral tablet: 3 tab(s) orally once a day  predniSONE 10 mg oral tablet: 1 tab(s) orally once a day  predniSONE 20 mg oral tablet: 1 tab(s) orally once a day  risperiDONE 1 mg oral tablet: 1 tab(s) orally 2 times a day  tiotropium 18 mcg inhalation capsule: 1 cap(s) inhaled once a day  traZODone 100 mg oral tablet: 1 tab(s) orally once a day (at bedtime)

## 2021-08-27 NOTE — PROGRESS NOTE ADULT - PROBLEM SELECTOR PLAN 1
s/p Left Bronchial stenting on 8/16 and Rt Bronchial stenting on 8/18 with Dr Amaya.  Now on nasal O2.  Continue Duonebs and Symbicort  Solumedrol 30 BID   Tolerating diet.  Oncology follow up appreciated - palliative care consulted and sign off   -ideally pt should follow up at [facility] after discharge from the hospital. However, patient is homeless without social support, consistent followup during treatment could become an issue. Initiating chemotherapy in this situation would be unsafe for the patient  palliative consulted and now signed off  As per Dr. Sahu from [facility] pt can continue to f/u as outpatient   if no other treatment able to be offered Psych will need to evaluate and determine capacity for hospice     Dispo ALYX pending auth   covid test 8/26 (-)

## 2021-08-27 NOTE — PROGRESS NOTE ADULT - PROBLEM SELECTOR PROBLEM 2
Polysubstance abuse

## 2021-08-27 NOTE — PROGRESS NOTE ADULT - PROVIDER SPECIALTY LIST ADULT
Thoracic Surgery
Heme/Onc
Palliative Care
Thoracic Surgery
Thoracic Surgery
CT Surgery
Thoracic Surgery

## 2021-08-27 NOTE — PROGRESS NOTE ADULT - ASSESSMENT
56M homeless with a PMH of smoker (quit July 2021), polysubstance abuse (cocaine, marijuana), COPD, depression, recently diagnosed with lung cancer, has been receiving RT at [facility]. He was discharge from [facility] to [place] 8/6/21. He then presented to  8/13 from [place] due to respiratory distress requiring intubation. HE was found to have an endobronchial mass and was then transferred to [facility] for further care. He underwent FB and left mainstem bronchial stenting with Dr. Amaya on 8/16, then RTOR for right mainstem stent 8/18. Inpatient course has been complicated by suicidal ideations requiring constant observation,  consulted. Otherwise, he was seen by Heme/Onc who requested palliative care given patient is likely not a candidate for chemotherapy due to social issues.

## 2021-08-27 NOTE — DISCHARGE NOTE NURSING/CASE MANAGEMENT/SOCIAL WORK - NSDCFUADDAPPT_GEN_ALL_CORE_FT
Please follow up with Dr. George from [facility] for further treatments     Please follow up with Dr. Jan Chi to assess any further treatments.

## 2021-08-27 NOTE — DISCHARGE NOTE PROVIDER - NSDCCPTREATMENT_GEN_ALL_CORE_FT
PRINCIPAL PROCEDURE  Procedure: Insertion, stent, bronchial  Findings and Treatment: Bronchus intermedium, [id number] stent      SECONDARY PROCEDURE  Procedure: Insertion of stent in right mainstem bronchus  Findings and Treatment:

## 2021-08-27 NOTE — PROGRESS NOTE ADULT - NUTRITIONAL ASSESSMENT
This patient has been assessed with a concern for Malnutrition and has been determined to have a diagnosis/diagnoses of Severe protein-calorie malnutrition and Underweight (BMI < 19).    This patient is being managed with:   Safety Tray-    Time/Priority:  Routine  Entered: Aug 21 2021  5:56PM    Diet Regular-  Supplement Feeding Modality:  Oral  Ensure Enlive Cans or Servings Per Day:  3       Frequency:  Three Times a day  Entered: Aug 20 2021  9:49AM    
This patient has been assessed with a concern for Malnutrition and has been determined to have a diagnosis/diagnoses of Severe protein-calorie malnutrition and Underweight (BMI < 19).    This patient is being managed with:   Diet NPO-  Entered: Aug 13 2021  7:54PM    
This patient has been assessed with a concern for Malnutrition and has been determined to have a diagnosis/diagnoses of Severe protein-calorie malnutrition and Underweight (BMI < 19).    This patient is being managed with:   Diet NPO-  Entered: Aug 16 2021  3:32PM    
This patient has been assessed with a concern for Malnutrition and has been determined to have a diagnosis/diagnoses of Severe protein-calorie malnutrition and Underweight (BMI < 19).    This patient is being managed with:   Safety Tray-    Time/Priority:  Routine  Entered: Aug 21 2021  5:56PM    Diet Regular-  Supplement Feeding Modality:  Oral  Ensure Enlive Cans or Servings Per Day:  3       Frequency:  Three Times a day  Entered: Aug 20 2021  9:49AM    
This patient has been assessed with a concern for Malnutrition and has been determined to have a diagnosis/diagnoses of Severe protein-calorie malnutrition and Underweight (BMI < 19).    This patient is being managed with:   Safety Tray-    Time/Priority:  Routine  Entered: Aug 21 2021  5:56PM    Diet Regular-  Supplement Feeding Modality:  Oral  Ensure Enlive Cans or Servings Per Day:  3       Frequency:  Three Times a day  Entered: Aug 20 2021  9:49AM    
This patient has been assessed with a concern for Malnutrition and has been determined to have a diagnosis/diagnoses of Severe protein-calorie malnutrition and Underweight (BMI < 19).    This patient is being managed with:   Diet NPO-  Entered: Aug 13 2021  7:54PM    
This patient has been assessed with a concern for Malnutrition and has been determined to have a diagnosis/diagnoses of Severe protein-calorie malnutrition and Underweight (BMI < 19).    This patient is being managed with:   Diet NPO with Tube Feed-  Tube Feeding Modality: Nasogastric  Vital 1.5 Ko (VITAL1.5)  Total Volume for 24 Hours (mL): 720  Continuous  Starting Tube Feed Rate {mL per Hour}: 30  Until Goal Tube Feed Rate (mL per Hour): 30  Tube Feed Duration (in Hours): 24  Tube Feed Start Time: 13:00  Tube Feed Stop Time: 23:59  Entered: Aug 17 2021  1:45PM    Diet NPO after Midnight-     NPO Start Date: 17-Aug-2021   NPO Start Time: 23:59  Entered: Aug 17 2021 10:02AM    
This patient has been assessed with a concern for Malnutrition and has been determined to have a diagnosis/diagnoses of Severe protein-calorie malnutrition and Underweight (BMI < 19).    This patient is being managed with:   Safety Tray-    Time/Priority:  Routine  Entered: Aug 21 2021  5:56PM    Diet Regular-  Supplement Feeding Modality:  Oral  Ensure Enlive Cans or Servings Per Day:  3       Frequency:  Three Times a day  Entered: Aug 20 2021  9:49AM    
This patient has been assessed with a concern for Malnutrition and has been determined to have a diagnosis/diagnoses of Severe protein-calorie malnutrition and Underweight (BMI < 19).    This patient is being managed with:   Diet Regular-  Supplement Feeding Modality:  Oral  Ensure Enlive Cans or Servings Per Day:  3       Frequency:  Three Times a day  Entered: Aug 20 2021  9:49AM    
This patient has been assessed with a concern for Malnutrition and has been determined to have a diagnosis/diagnoses of Severe protein-calorie malnutrition and Underweight (BMI < 19).    This patient is being managed with:   Diet Clear Liquid-  Entered: Aug 19 2021  1:06PM    
This patient has been assessed with a concern for Malnutrition and has been determined to have a diagnosis/diagnoses of Severe protein-calorie malnutrition and Underweight (BMI < 19).    This patient is being managed with:   Diet Regular-  Supplement Feeding Modality:  Oral  Ensure Plant-Based Cans or Servings Per Day:  3       Frequency:  Three Times a day  Entered: Aug 24 2021  1:40PM    Safety Tray-    Time/Priority:  Routine  Entered: Aug 21 2021  5:56PM      This patient has been assessed with a concern for Malnutrition and has been determined to have a diagnosis/diagnoses of Severe protein-calorie malnutrition and Underweight (BMI < 19).    This patient is being managed with:   Diet Regular-  Supplement Feeding Modality:  Oral  Ensure Plant-Based Cans or Servings Per Day:  3       Frequency:  Three Times a day  Entered: Aug 24 2021  1:40PM    Safety Tray-    Time/Priority:  Routine  Entered: Aug 21 2021  5:56PM    
This patient has been assessed with a concern for Malnutrition and has been determined to have a diagnosis/diagnoses of Severe protein-calorie malnutrition and Underweight (BMI < 19).    This patient is being managed with:   Safety Tray-    Time/Priority:  Routine  Entered: Aug 21 2021  5:56PM    Diet Regular-  Supplement Feeding Modality:  Oral  Ensure Enlive Cans or Servings Per Day:  3       Frequency:  Three Times a day  Entered: Aug 20 2021  9:49AM    
This patient has been assessed with a concern for Malnutrition and has been determined to have a diagnosis/diagnoses of Severe protein-calorie malnutrition and Underweight (BMI < 19).    This patient is being managed with:   Diet NPO with Tube Feed-  Tube Feeding Modality: Nasogastric  Vital 1.5 Ko (VITAL1.5)  Total Volume for 24 Hours (mL): 720  Continuous  Starting Tube Feed Rate {mL per Hour}: 30  Until Goal Tube Feed Rate (mL per Hour): 30  Tube Feed Duration (in Hours): 24  Tube Feed Start Time: 13:00  Tube Feed Stop Time: 23:59  Entered: Aug 18 2021  5:31PM

## 2021-08-27 NOTE — DISCHARGE NOTE NURSING/CASE MANAGEMENT/SOCIAL WORK - NSDCPEFALRISK_GEN_ALL_CORE
For information on Fall & injury Prevention, visit [email or web address]

## 2021-08-27 NOTE — DISCHARGE NOTE NURSING/CASE MANAGEMENT/SOCIAL WORK - NSDCPEWEB_GEN_ALL_CORE
[facility] for Tobacco Control website --- [email or web address] website --- [email or web address]

## 2021-08-27 NOTE — PROGRESS NOTE ADULT - PROBLEM SELECTOR PLAN 5
Seen by psych 8/20  Placed on 1:1 for suicidal ideation  Most recent eval 8/21 patient no longer at risk  Agitated 8/22 and abusive towards staff.  Threatening to throw his full urinal at staff.  Demanding he gets his pocket knife back.  - constant observation kept in place  8/23 pt calm and restraints d/c'd and patient place in chair, Pt however refusing to have another IV placed. Pt understands he is getting antibiotic and maintains his refusal for any needles/ IVs  8/24 pt calm still refusing certain medications, no suicidal ideation noted  8/26 Pt remains calm, no suicidal ideation noted.  8/27 Pt remains calm, no suicidal ideation noted

## 2021-08-27 NOTE — DISCHARGE NOTE PROVIDER - NSDCFUADDAPPT_GEN_ALL_CORE_FT
Please follow up with Dr. George from [facility] for further treatments     Please follow up with Dr. Jan Chi to assess any further treatments.

## 2021-08-27 NOTE — DISCHARGE NOTE PROVIDER - HOSPITAL COURSE
56M homeless with a PMH of smoker (quit July 2021), polysubstance abuse (cocaine, marijuana), COPD, depression, recently diagnosed with lung cancer, has been receiving RT at [facility]. He was discharge from [facility] to [facility] 8/6/21. He then presented to  8/13 from [facility] due to respiratory distress requiring intubation. 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 He underwent FB and left mainstem bronchial stenting with Dr. Amaya on 8/16, then RTOR for right mainstem stent 8/18. Inpatient course has been complicated by suicidal ideations requiring constant observation,  consulted, now resolved with No psychiatric contraindications to discharge . Otherwise, he was seen by Heme/Onc who requested palliative care given patient is likely not a candidate for chemotherapy due to social issues, of homelessness and not being able to consistently followup. Initiating chemotherapy in this situation would be unsafe for the patient as per Concetta and Dr. George who was spoken to from [facility] and is willing to continue RT treatment as outpatient, and when patient's social situation becomes more stable. Pt hemodynamically stable on 3 L O2 NC. Pt determined stable for discharge as per Dr. Amaya to [facility].

## 2021-08-27 NOTE — DISCHARGE NOTE PROVIDER - NSDCCPCAREPLAN_GEN_ALL_CORE_FT
PRINCIPAL DISCHARGE DIAGNOSIS  Diagnosis: Endobronchial mass  Assessment and Plan of Treatment:       SECONDARY DISCHARGE DIAGNOSES  Diagnosis: Afib  Assessment and Plan of Treatment: Take all medications as prescribed. Follow up with your primary care doctor within two weeks.      Diagnosis: Polysubstance abuse  Assessment and Plan of Treatment:     Diagnosis: Depression  Assessment and Plan of Treatment:

## 2021-08-27 NOTE — DISCHARGE NOTE PROVIDER - DETAILS OF MALNUTRITION DIAGNOSIS/DIAGNOSES
This patient has been assessed with a concern for Malnutrition and was treated during this hospitalization for the following Nutrition diagnosis/diagnoses:     -  08/15/2021: Severe protein-calorie malnutrition   -  08/15/2021: Underweight (BMI < 19)

## 2021-08-27 NOTE — DISCHARGE NOTE PROVIDER - NSDCFUADDINST_GEN_ALL_CORE_FT
Please call the Cardiothoracic Surgery office at [phone] if you are experiencing any shortness of breath, chest pain, fevers or chills, drainage from the incisions, persistent nausea, vomiting or if you have any questions about your medications. If the symptoms are severe, call 911 and go to the nearest hospital.

## 2021-08-27 NOTE — PROGRESS NOTE ADULT - PROBLEM SELECTOR PROBLEM 3
Afib
Prophylactic measure
Afib
Prophylactic measure
Afib
Afib
Prophylactic measure
Afib

## 2021-08-27 NOTE — DISCHARGE NOTE PROVIDER - CARE PROVIDER_API CALL
LAW LEVI  Radiation Oncology  [address] [place] [address]  Phone: [phone]  Fax: [phone]  Follow Up Time:     Kade Zuluaga)  Internal Medicine  [address] [place] [address] [place] [address]  Phone: [phone]  Fax: [phone]  Follow Up Time:

## 2021-08-27 NOTE — PROGRESS NOTE ADULT - PROBLEM SELECTOR PLAN 2
Currently sedated, no signs/symptoms of withdrawal
Currently sedated, no signs/symptoms of withdrawal
No signs/symptoms of withdrawal
No signs/symptoms of withdrawal  Seen by behavioral health - recommendations noted  per behavioral health, consider d/c to drug rehab facility  Pt refusing psych meds
Currently sedated, no signs/symptoms of withdrawal
Currently sedated, no signs/symptoms of withdrawal
No signs/symptoms of withdrawal  Seen by behavioral health - recommendations noted  per behavioral health, consider d/c to drug rehab facility  Pt refusing psych meds
No signs/symptoms of withdrawal  Seen by behavioral health - recommendations noted  D/C constant observation, however patient was combative and it was resumed overnight  per behavioral health, consider d/c to drug rehab facility
No signs/symptoms of withdrawal  Seen by behavioral health - recommendations noted  per behavioral health, consider d/c to drug rehab facility  Pt refusing psych meds
No signs/symptoms of withdrawal
No signs/symptoms of withdrawal  Seen by behavioral health - recommendations noted  per behavioral health, consider d/c to drug rehab facility  Pt refusing psych meds
No signs/symptoms of withdrawal  Seen by behavioral health - recommendations noted  D/C constant observation,   per behavioral health, consider d/c to drug rehab facility
Currently sedated, no signs/symptoms of withdrawal
Currently sedated, no signs/symptoms of withdrawal

## 2021-08-28 LAB
CULTURE RESULTS: SIGNIFICANT CHANGE UP
SPECIMEN SOURCE: SIGNIFICANT CHANGE UP

## 2021-10-01 NOTE — ED ADULT TRIAGE NOTE - NS_BH TRG QUESTION8_ED_ALL_ED
[facility] ONCOLOGY  Hematology/Oncology Clinic Consult Note     Patient: Sara Banks Age: 84 year old  MRN: [id number]      Date of Visit: October 5, 2021     Chief Complaint: Pancytopenia    Cancer History:  Cancer Staging  No matching staging information was found for the patient.    Oncology History    No history exists.        SUBJECTIVE  HISTORY OF PRESENT ILLNESS:   Dear Kadeem,     Sara Banks was seen in initial hematology evaluation on October 5, 2021. Thank you for referring her regarding her pancytopenia.    As you know, Sara Banks is a 84 year old female with a history of heart disease who has had cytopenias dating back to at least 2019.    In February 2019, she underwent PCI of the right coronary artery and circumflex. Labs on February 20, 2019 showed platelet count of 137,000, RBC of 3.88, WBC of 3.2, and absolute lymphocyte count of 0.6. Hgb was normal at 12.3, ANC was normal at 2.1. CMP showed a creatinine of 1.02, BUN of 24, glucose of 103. Magnesium was normal at 2.1.    CBC on February 21, 2019 showed Hgb of 9.2, RBC of 2.93, WBC of 3.6, HCT of 28.2, and platelet count of 129,000. CBC on February 22, 2019 showed WBC of 3.0, HCT of 26.9, platelet count of 130,000, RBC of 2.77, Hgb of 8.8.    Labs performed on February 25, 2019 showed free T3 of 225, free T4 of 1.13, TSH was 15.555. Ferritin was normal at 195, iron was normal at 50, TIBC was 255, percent iron saturation was 20%.    CBC on April 25, 2019 showed WBC of 2.3, RBC of 3.66, Hgb of 11.1, HCT of 34.9, MCHC of 31.8. ANC was 1.3, absolute lymphocyte count was 0.5. Reticulocyte count, folate, Vitamin B12, and TAHMINA screen was negative. Sedimentation rate was 24.    CBC on June 12, 2019 showed WBC of 1.9, RBC of 3.67, Hgb of 10.9, HCT of 34.5, platelet count of 122,000.    CBC on December 4, 2019 showed WBC of 2.0, RBC of 3.36, Hgb of 10.5, HCT of 32.6, platelet count of 121,000, ANC of 1.2, absolute lymphocyte count of 0.4, absolute monocyte  count of 0.2. Reticulocyte count was normal.    CBC on July 20, 2020 showed WBC of 2.3, RBC of 3.46, Hgb of 10.6, HCT of 33.7, platelet count of 119,000, ANC of 1.7, absolute lymphocyte count of 0.3, absolute monocyte count of 0.2.    CBC on April 19, 2021 showed WBC of 1.9, RBC of 3.27, Hgb of 10.2, HCT of 31.7, platelet count of 126,000. Percent myelocytes was 1%, ANC was 1.2, absolute lymphocyte count was 0.5, absolute monocyte count was 0.2.    Most recent CBC on September 17, 2021 showed WBC of 2.1, RBC of 3.33, Hgb of 10.2, HCT of 32.1, platelet count of 119,000. ANC was 1.4, absolute lymphocyte count was 0.4, absolute monocyte count was 0.2.    She was able to provide old blood test dating back quite some time.  In 2006 she had a white count of 3.1 and at that time her platelet count was actually 39,000.  In 2007 she had a white count of 3.9 with a platelet count of 34,000.  In 2016 her white count was 3.1 and platelets of 144,000.    A CBC in 12 4 of 2019 showed a white count of 2000, hemoglobin at that time was 10.5 and platelets 121,000.  White cell differential was fairly unremarkable.      She presents today in initial evaluation.    HISTORIES:    PAST MEDICAL HISTORY:  Past Medical History:   Diagnosis Date   • Coronary artery disease    • Edema    • Hypertension    • Hypothyroidism    • Mixed hyperlipidemia    • Thrombocytopenic (CMS/HCC)    • Vertigo    Arthritis  Carpal Tunnel    PAST SURGICAL HISTORY:  Past Surgical History:   Procedure Laterality Date   • Appendectomy     PCI of the circumflex and RCA for non STEMI (2/2019)    FAMILY HISTORY:  Family History   Problem Relation Age of Onset   • Myocardial Infarction Father    • Cancer, Colon Brother    • Patient is unaware of any medical problems Maternal Grandmother    • Patient is unaware of any medical problems Maternal Grandfather    • Patient is unaware of any medical problems Paternal Grandmother    • Patient is unaware of any medical problems  Paternal Grandfather        SOCIAL HISTORY:  She resides in [place].  Social History     Socioeconomic History   • Marital status:      Spouse name: Not on file   • Number of children: Not on file   • Years of education: Not on file   • Highest education level: Not on file   Occupational History   • Not on file   Tobacco Use   • Smoking status: Never Smoker   • Smokeless tobacco: Never Used   Substance and Sexual Activity   • Alcohol use: No     Comment: wine once in a while   • Drug use: No   • Sexual activity: Not on file   Other Topics Concern   • Not on file   Social History Narrative   • Not on file     Social Determinants of Health     Financial Resource Strain:    • Social Determinants: Financial Resource Strain: Not on file   Food Insecurity:    • Social Determinants: Food Insecurity: Not on file   Transportation Needs:    • Lack of Transportation (Medical): Not on file   • Lack of Transportation (Non-Medical): Not on file   Physical Activity:    • Days of Exercise per Week: Not on file   • Minutes of Exercise per Session: Not on file   Stress:    • Social Determinants: Stress: Not on file   Social Connections:    • Social Determinants: Social Connections: Not on file   Intimate Partner Violence:    • Social Determinants: Intimate Partner Violence Past Fear: Not on file   • Social Determinants: Intimate Partner Violence Current Fear: Not on file           ALLERGIES:  No Known Allergies     Current Outpatient Medications   Medication Sig Dispense Refill   • rosuvastatin (CRESTOR) 20 MG tablet TAKE 1 TABLET BY MOUTH EVERY DAY 90 tablet 0   • lisinopril (ZESTRIL) 20 MG tablet Take 1 tablet by mouth daily. 90 tablet 3   • clopidogrel (PLAVIX) 75 MG tablet Take 1 tablet by mouth daily. 90 tablet 3   • furosemide (LASIX) 40 MG tablet Take 1 tablet by mouth as needed (edema). 90 tablet 3   • levothyroxine (SYNTHROID, LEVOTHROID) 50 MCG tablet Take 50 mcg by mouth daily.       No current  facility-administered medications for this visit.        OB History   No obstetric history on file.          REVIEW OF SYSTEMS:    Review of Systems - Oncology  She reports feeling well.  No fevers or night sweats.  No excessive bruising or bleeding.  She does have some ecchymosis on her hands but that is chronic.  No unexplained weight loss.  The remainder of the review was asked at its entirety and is otherwise negative.  OBJECTIVE  Physical Examination    Performance Status:   ECOG [10/05/21 1957]   ECOG Performance Status 0     Visit Vitals  BP (!) 146/62 (BP Location: LUE - Left upper extremity, Patient Position: Sitting, Cuff Size: Regular)   Wt 66.7 kg (147 lb)   BMI 26.89 kg/m²     General: awake, alert, oriented, NAD, appears staged age, accompanied by her granddaughter.  HEENT: normocephalic, without obvious abnormality, conjunctiva clear bilaterally, no scleral icterus, oropharynx clear without exudates  Lungs: clear to auscultation bilaterally, no wheezing/rales/rhonchi  Heart: regular rate and rhythm, S1 and S2 normal, no murmur/rubs/gallops  Abdomen: soft, non-tender, non-distended, NABS, no palpable organomegaly  Extremities: Mild peripheral edema more on the left than on the right in the lower extremities.  She is wearing compression stockings.  Musculoskeletal: symmetrical strength and tone, full active and passive ROM  Skin: skin color, texture, and turgor normal, no visible rashes or lesions  Neurologic: oriented x 3, no focal deficits  Psych: mood and affect normal, good insight    Laboratory/Imaging  Lab Services on 09/17/2021   Component Date Value   • WBC 09/17/2021 2.1*   • RBC 09/17/2021 3.33*   • HGB 09/17/2021 10.2*   • HCT 09/17/2021 32.1*   • MCV 09/17/2021 96.4    • MCH 09/17/2021 30.6    • MCHC 09/17/2021 31.8*   • RDW-CV 09/17/2021 13.2    • RDW-SD 09/17/2021 47.1    • PLT 09/17/2021 119*   • NRBC 09/17/2021 0    • Neutrophil, Percent 09/17/2021 66    • Lymphocytes, Percent 09/17/2021  19    • Mono, Percent 09/17/2021 10    • Eosinophils, Percent 09/17/2021 4    • Basophils, Percent 09/17/2021 1    • Immature Granulocytes 09/17/2021 0    • Absolute Neutrophils 09/17/2021 1.4*   • Absolute Lymphocytes 09/17/2021 0.4*   • Absolute Monocytes 09/17/2021 0.2*   • Absolute Eosinophils  09/17/2021 0.1    • Absolute Basophils 09/17/2021 0.0    • Absolute Immmature Granu* 09/17/2021 0.0        Imaging Studies:  Imaging studies reviewed. Refer to HPI.      ASSESSMENT / PLAN:      This is an 84-year-old female with longstanding intermittent pancytopenia.  At times her white count fluctuates.  It is been as low as 2000 many years ago and its in this range at this time.  She has never been neutropenic.  Her platelet count fluctuates and at the current time it is reasonably stable.  The only new her development is progressive anemia but she had a hemoglobin similar to that in 2019.  I recommended some other serologic studies to investigate this further.  If her work-up were complete, she should have endoscopic exams due to the worsening anemia.  However, she is not interested in that, and her hemoglobin has been in this range before.  If her serologic studies are unremarkable, given the chronicity to this and the fact that she is never had any significant sequela related to it, I would advocate simply monitoring her blood counts.  We did talk about doing a bone marrow biopsy for definitive diagnosis but given the chronicity, and the fact that it has never caused complications, she prefers not to do that which is reasonable.    Further plans recommendations will follow.  I did recommend that we at least see her at 6-month intervals to make sure there are no new developments and that we are at least keeping track of her blood counts and her clinical condition.    I appreciate participating this pleasant woman's care.  Please call with questions.          On 10/5/2021, I, Lorena Fischer scribed the services  personally performed by Anant Monroy D.O.    The documentation recorded by the scribe accurately and completely reflects the service(s) I personally performed and the decisions made by me.      No

## 2021-11-30 NOTE — ED ADULT NURSE NOTE - MODE OF DISCHARGE
----- Message from Krystal Renee CMA sent at 11/30/2021  9:30 AM CST -----  Hello,  Please reschedule pt for a colp, Dr. Martines doesn't do them.  Krystal MIRANDA    
Pt was called today & a message was left.  Dr Martines comes up as provider that does colposcopy so will have that visit type removed from her list.     Please schedule with another provider that does colposcopy   
Ambulatory

## 2022-07-14 NOTE — PRE PROCEDURE NOTE - PRE PROCEDURE EVALUATION
consulted for lung biopsy    patient with Right lung mass, emphysema and extensive mediastinal adenopathy which exerts mass effect on the bronchi..  there is also an ulcered soft tissue mass in the right back,    discussed with dr cerda, and dr santiago, will bx the lower risk soft tissue mass on the right back, which may obviate the need for the lung biopsy  (which is higher risk due to the emphysema and mediastinal adenopathy)    patient in agreement, consent obtained. Terbinafine Counseling: Patient counseling regarding adverse effects of terbinafine including but not limited to headache, diarrhea, rash, upset stomach, liver function test abnormalities, itching, taste/smell disturbance, nausea, abdominal pain, and flatulence.  There is a rare possibility of liver failure that can occur when taking terbinafine.  The patient understands that a baseline LFT and kidney function test may be required. The patient verbalized understanding of the proper use and possible adverse effects of terbinafine.  All of the patient's questions and concerns were addressed.

## 2022-09-21 NOTE — PATIENT PROFILE ADULT - NSPRESCRALCSCORE_GEN_A_NUR_CAL
Pt called and scheduled NEW PT service to cardiology from Dr. Nunes for NICM (nonischemic cardiomyopathy) (CMS/HCC) [I42.8] w/Dr. Nagel 10/21 @ 11:15 AM   1

## 2022-12-29 NOTE — ED ADULT NURSE NOTE - EXTENSIONS OF SELF_ADULT
Please get your bloodwork    Colonoscopy Instructions:    The entire day before your procedure please drink only clear liquids (water, gatorade, soup broth, jello, coffee without creamer, juice without pulp, etc).   The night before your procedure please take one half of the bowel prep at about 6 PM, and take the second half of the bowel prep the day of your procedure 5 hours before you are scheduled to come in.   It is very important that you take the entirety of both halves of the bowel prep in a split dose to ensure that your colon is adequately prepared for the procedure.   No eating or drinking the day of the procedure except for your medications in the morning with a small sip of water, and the second half of the bowel prep 5 hours before you are scheduled to come in.  Please ensure you have a responsible adult who can take you home after the procedure.  Please ensure you get your COVID testing before the procedure as instructed.      None
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
## 2023-04-13 NOTE — ED PROVIDER NOTE - OBJECTIVE STATEMENT
n/a
55 y/o male with a PMHx of COPD, Bipolar disorder, Schizophrenia, Drug abuse, stage 4 Lung CA, presents to the ED BIBA from [facility] for respiratory distress. Pt was found to be in new onset rapid A-fib. Pt with chronic cough and SOB. Pt with increase SOB that he required oxygen last night. Pt c/o chest pain. No fever. Pt is a full code.

## 2024-01-22 NOTE — PHYSICAL THERAPY INITIAL EVALUATION ADULT - THERAPY FREQUENCY, PT EVAL
She wishes to take trial off medication to see if still needed  She will stop pravastatin and repeat lipids at next visit  Adhere to good diet    daily

## 2024-05-16 NOTE — ED ADULT NURSE NOTE - NSFALLRSKASSESASSIST_ED_ALL_ED
[facility]  H&P  Name: Manjit Hernandez 75 y.o. male I MRN: [id number]  Unit/Bed#: -01 I Date of Admission: 5/15/2024   Date of Service: 5/16/2024 I Hospital Day: 0      Assessment & Plan   * Left lower lobe pneumonia  Assessment & Plan  Presents with 1 week of URI symptoms  Now with productive cough and dyspnea  CT chest with left lower lobe infiltrates  Patient is 3 weeks postoperative total knee replacement  Empiric ceftriaxone/doxycycline  Sputum cultures ordered  Urine for Legionella/urine for strep pneumonia ordered  Procalcitonin trending    Chronic obstructive pulmonary disease with acute lower respiratory infection (HCC)  Assessment & Plan  COPD with mild exacerbation  Continue nebulized bronchodilators and home Pulmicort  No indication for IV steroids at this time  Continue to monitor  Empiric ceftriaxone/doxycycline for pulmonary infection    PAF (paroxysmal atrial fibrillation) (Formerly [facility])  Assessment & Plan  PTA bisoprolol and digoxin  Digoxin level pending  Chronically anticoagulated with Eliquis  Admission EKG A-fib rate 102    Severe sepsis (HCC)  Assessment & Plan  Hypothermia, tachycardia, leukocytosis with transient hypotension fluid responsive  Did not receive full 30 mL/kg crystalloid fluid resuscitation secondary to history CHF and resolution of hypotension after smaller volume.  Lactic 1.9  Leukocytosis 17.66  Blood cultures pending  Trend fever curve, leukocytosis      Hyperbilirubinemia  Assessment & Plan  Total bilirubin 3.41  Has been slowly climbing over the years  Outpatient workup for further evaluation    Anemia  Assessment & Plan  Hemoglobin 10.3 on admission  Was 12.7 on 4/25 prior to left TKR  Trend hemoglobin    History of total left knee replacement (TKR)  Assessment & Plan  Hx left TKR 4/25/2024 by Dr. Bernabe at [facility]  Well-healed incisions  Lower extremity edematous but I suspect normal postoperative swelling  Venous duplex is ordered by  the ED, will complete study  Outpatient follow-up    Chronic diastolic congestive heart failure (HCC)  Assessment & Plan  Wt Readings from Last 3 Encounters:   05/15/24 (!) 138 kg (303 lb 5.7 oz)   10/30/23 (!) 148 kg (327 lb)   09/21/21 (!) 143 kg (316 lb)   History CHF with preserved ejection fraction  Euvolemic on exam  No diuretics in the outpatient setting  Monitor volume status    Essential (primary) hypertension  Assessment & Plan  PTA medications on hold due to transient hypotension  Trend blood pressures  Update medication list for correct Zebeta dosage    Morbid obesity (HCC)  Assessment & Plan  BMI 40  Requires intensive lifestyle modification    VTE Pharmacologic Prophylaxis:   High Risk (Score >/= 5) - Pharmacological DVT Prophylaxis Ordered: rivaroxaban (Xarelto). Sequential Compression Devices Ordered.  Code Status: Level 1 - Full Code   Discussion with family: Attempted to update  (wife) via phone. Left voicemail.     Anticipated Length of Stay: Patient will be admitted on an inpatient basis with an anticipated length of stay of greater than 2 midnights secondary to pneumonia.    Total Time Spent on Date of Encounter in care of patient: 45 mins. This time was spent on one or more of the following: performing physical exam; counseling and coordination of care; obtaining or reviewing history; documenting in the medical record; reviewing/ordering tests, medications or procedures; communicating with other healthcare professionals and discussing with patient's family/caregivers.    Chief Complaint: Dyspnea    History of Present Illness:  Manjit Hernandez is a 75 y.o. male with a PMH of right lower lobe lobectomy for lung cancer, PAF, COVID, PE, CHF with preserved ejection fraction, COPD, hearing deficit, hypertension, recent left TKR on 4/25 who presents with dyspnea.  Patient states good postoperative course until 1 week ago when he developed URI symptoms.  Tonight very dyspneic presented to  the ED where CT chest revealed pneumonia.  Patient is chronically anticoagulated with INR 3.  Presented to the medical service for further evaluation and treatment.    Review of Systems:  Review of Systems   Constitutional:  Negative for chills and fever.   HENT:  Positive for congestion. Negative for ear pain and sore throat.    Eyes:  Negative for pain and visual disturbance.   Respiratory:  Positive for cough, shortness of breath and wheezing.    Cardiovascular:  Positive for leg swelling. Negative for chest pain and palpitations.   Gastrointestinal:  Negative for abdominal pain and vomiting.   Genitourinary:  Negative for dysuria and hematuria.   Musculoskeletal:  Negative for arthralgias and back pain.   Skin:  Negative for color change and rash.   Neurological:  Negative for seizures and syncope.   All other systems reviewed and are negative.      Past Medical and Surgical History:   Past Medical History:   Diagnosis Date    Arthritis     (L) hip    Asthma     Atrial fibrillation (HCC)     Cataract     PHIL    CHF (congestive heart failure) (HCC)     COPD (chronic obstructive pulmonary disease) (HCC)     Hernia of abdominal wall     Hip joint pain     (L)    [place] (hard of hearing)     phil hearing aides    Hypertension     Lung cancer (HCC)     Macular degeneration     phil    Psoriasis     elbows and ankles- small  red open areas left ankle    Pulmonary embolism (HCC)     Pulmonary emphysema (HCC)     Wears glasses        Past Surgical History:   Procedure Laterality Date    CARPAL TUNNEL RELEASE Bilateral     CERVICAL FUSION      C 3/4    COLONOSCOPY      RI ARTHRP ACETBLR/PROX FEM PROSTC AGRFT/ALGRFT Left 2/26/2016    Procedure: ARTHROPLASTY HIP TOTAL;  Surgeon: Jewel Sales MD;  Location: [facility] OR;  Service: Orthopedics       Meds/Allergies:  Prior to Admission medications    Medication Sig Start Date End Date Taking? Authorizing Provider   bisoprolol (ZEBETA) 10 MG tablet Take 5 mg by mouth 2 (two) times a  day   Yes Historical Provider, MD   rivaroxaban (Xarelto) 20 mg tablet Take 20 mg by mouth   Yes Historical Provider, MD   albuterol (2.5 mg/3 mL) 0.083 % nebulizer solution Take 2.5 mg by nebulization every 6 (six) hours as needed for wheezing or shortness of breath    Historical Provider, MD   albuterol (Ventolin HFA) 90 mcg/act inhaler Inhale 2 puffs every 6 (six) hours as needed for wheezing 10/6/21   Jie Vazquez PA-C   aspirin 81 mg chewable tablet Chew 81 mg daily    Historical Provider, MD   atorvastatin (LIPITOR) 20 mg tablet Take 20 mg by mouth daily 8/7/19   Historical Provider, MD   budesonide (Pulmicort) 0.5 mg/2 mL nebulizer solution Take 2 mL (0.5 mg total) by nebulization 2 (two) times a day Rinse mouth after use. 11/3/23 12/3/23  Margo Sky MD   digoxin (LANOXIN) 0.125 mg tablet Take 1 tablet (125 mcg total) by mouth daily Do not start before November 4, 2023. 11/4/23   Margo Sky MD   Magnesium 400 MG TABS Take 1 tablet by mouth 3 (three) times a day    Historical Provider, MD   Propylene Glycol (SYSTANE BALANCE) 0.6 % SOLN Apply to eye    Historical Provider, MD   Spacer/Aero-Holding Chambers (Carriehamchang Crane) MISC USE WITH INHALER 9/14/21   Historical Provider, MD   apixaban (Eliquis) 5 mg Take 1 tablet (5 mg total) by mouth 2 (two) times a day 10/30/23 5/16/24  Teresa Aparicio MD   DM-APAP-CPM (CORICIDIN HBP PO) Take 1 tablet by mouth 3 (three) times a day  5/16/24  Historical Provider, MD   furosemide (LASIX) 40 mg tablet Take 40 mg by mouth daily  5/16/24  Historical Provider, MD   metoprolol tartrate (LOPRESSOR) 100 mg tablet Take 1 tablet (100 mg total) by mouth every 12 (twelve) hours 11/3/23 5/16/24  Margo Sky MD     I have reviewed home medications with patient personally.    Allergies:   Allergies   Allergen Reactions    Amoxicillin Rash    Polyethylene Glycol Hives    Lisinopril Other (See Comments)     dizziness    Gabapentin      Other reaction(s): PSORASIS  FLAIR UP    Latanoprost Eye Swelling    Lumigan [Bimatoprost] Other (See Comments)     Burning in eyes    Meloxicam GI Intolerance    Chlorhexidine Rash       Social History:  Marital Status: /Civil Union   Occupation: Retired  Patient Pre-hospital Living Situation: With spouse  Patient Pre-hospital Level of Mobility: unable to be assessed at time of evaluation  Patient Pre-hospital Diet Restrictions: None  Substance Use History:   Social History     Substance and Sexual Activity   Alcohol Use Not Currently    Comment: 1x year     Social History     Tobacco Use   Smoking Status Former    Current packs/day: 0.00    Average packs/day: 2.0 packs/day for 40.0 years (80.0 ttl pk-yrs)    Types: Cigarettes    Start date: 1973    Quit date: 2013    Years since quittin.3   Smokeless Tobacco Never     Social History     Substance and Sexual Activity   Drug Use No       Family History:  History reviewed. No pertinent family history.    Physical Exam:     Vitals:   Blood Pressure: 121/75 (24 0051)  Pulse: 83 (24 0204)  Temperature: 97.7 °F (36.5 °C) (24 0051)  Temp Source: Oral (05/15/24 2345)  Respirations: 22 (24 0204)  Weight - Scale: (!) 138 kg (303 lb 5.7 oz) (05/15/24 2214)  SpO2: 96 % (24 0204)    Physical Exam  Vitals and nursing note reviewed.   Constitutional:       General: He is not in acute distress.     Appearance: He is well-developed. He is obese.   HENT:      Head: Normocephalic and atraumatic.   Eyes:      Conjunctiva/sclera: Conjunctivae normal.   Cardiovascular:      Rate and Rhythm: Normal rate and regular rhythm.      Heart sounds: No murmur heard.  Pulmonary:      Effort: Respiratory distress present.      Breath sounds: Wheezing present.   Abdominal:      Palpations: Abdomen is soft.      Tenderness: There is no abdominal tenderness.   Musculoskeletal:         General: Swelling present.      Cervical back: Neck supple.      Left lower leg: Edema  present.   Skin:     General: Skin is warm and dry.      Capillary Refill: Capillary refill takes less than 2 seconds.   Neurological:      General: No focal deficit present.      Mental Status: He is alert and oriented to person, place, and time.   Psychiatric:         Mood and Affect: Mood normal.         Behavior: Behavior normal.     Well-healing left knee wound      Additional Data:     Lab Results:  Results from last 7 days   Lab Units 05/15/24  2226   WBC Thousand/uL 17.66*   HEMOGLOBIN g/dL 10.3*   HEMATOCRIT % 33.1*   PLATELETS Thousands/uL 405*   SEGS PCT % 77*   LYMPHO PCT % 11*   MONO PCT % 10   EOS PCT % 1     Results from last 7 days   Lab Units 05/15/24  2226   SODIUM mmol/L 135   POTASSIUM mmol/L 3.8   CHLORIDE mmol/L 100   CO2 mmol/L 26   BUN mg/dL 28*   CREATININE mg/dL 1.20   ANION GAP mmol/L 9   CALCIUM mg/dL 8.5   ALBUMIN g/dL 3.7   TOTAL BILIRUBIN mg/dL 3.41*   ALK PHOS U/L 60   ALT U/L 14   AST U/L 16   GLUCOSE RANDOM mg/dL 174*     Results from last 7 days   Lab Units 05/15/24  2226   INR  3.00*             Results from last 7 days   Lab Units 05/15/24  2226   LACTIC ACID mmol/L 1.9   PROCALCITONIN ng/ml 0.14       Lines/Drains:  Invasive Devices       Peripheral Intravenous Line  Duration             Peripheral IV 05/15/24 Left Antecubital <1 day                        Imaging: Reviewed radiology reports from this admission including: chest CT scan  CTA ED chest PE Study    (Results Pending)   VAS VENOUS DUPLEX -LOWER LIMB UNILATERAL    (Results Pending)       EKG and Other Studies Reviewed on Admission:   EKG: Atrial fibrillation. .    ** Please Note: This note has been constructed using a voice recognition system. **             yes

## 2024-05-20 NOTE — PATIENT PROFILE ADULT - PRO INTERPRETER NEED 2
[FreeTextEntry1] : Will continue current med regimen due to benefit  PLAN 1) Continue Sertraline 200mg qdaily for depression and anxiety 2) Continue clonazepam 1mg TID for anxiety/panic attacks 3) Continue Trazodone 300mg qhs for insomnia 4) Follow up in 4 weeks.  5) continue Vistaril 10mg qhs PRN (to be used for middle insomnia) English

## 2024-09-03 NOTE — ED PROVIDER NOTE - PMH
Received communication from VERÓNICA Dumont. Informed me that patient's pain is well controlled and he does not want the ketamine drip any more. Patient refusing further ketamine drip for pain control.    I have cancelled ketamine drip due to patient preference and well controlled pain.    Mayte Gomez MD  Resident Physician  Orthopedic Surgery     No pertinent past medical history

## 2024-11-13 NOTE — ED PROVIDER NOTE - CHPI ED SYMPTOMS NEG
Show Aperture Variable?: Yes
Spray Paint Technique: No
Spray Paint Text: The liquid nitrogen was applied to the skin utilizing a spray paint frosting technique.
Application Tool (Optional): Liquid Nitrogen Sprayer
Pared With?: 15 blade
Duration Of Freeze Thaw-Cycle (Seconds): 5-10
Medical Necessity Clause: This procedure was medically necessary because the lesions that were treated were:
Detail Level: Detailed
Post-Care Instructions: I reviewed with the patient in detail post-care instructions. Patient is to wear sunprotection, and avoid picking at any of the treated lesions. Pt may apply Vaseline to crusted or scabbing areas.
Number Of Freeze-Thaw Cycles: 3 freeze-thaw cycles
Consent: The patient's consent was obtained including but not limited to risks of crusting, scabbing, blistering, scarring, darker or lighter pigmentary change, recurrence, incomplete removal and infection.
Medical Necessity Information: It is in your best interest to select a reason for this procedure from the list below. All of these items fulfill various CMS LCD requirements except the new and changing color options.
no syncope/no chills/no back pain/no dizziness/no fever/no nausea/no cough/no vomiting/no diaphoresis

## 2024-12-27 NOTE — DIETITIAN NUTRITION RISK NOTIFICATION - UPON NUTRITIONAL ASSESSMENT BY THE REGISTERED DIETITIAN YOUR PATIENT WAS DETERMINED TO MEET CRITERIA/HAS EVIDENCE OF THE FOLLOWING DIAGNOSIS:
Moderate protein-calorie malnutrition [FreeTextEntry1] : labs drawn in office today.  ecg obtained for diagnosis and management of the assessed problem(s)